# Patient Record
Sex: FEMALE | Race: WHITE | NOT HISPANIC OR LATINO | Employment: OTHER
[De-identification: names, ages, dates, MRNs, and addresses within clinical notes are randomized per-mention and may not be internally consistent; named-entity substitution may affect disease eponyms.]

---

## 2017-01-11 ENCOUNTER — HOSPITAL (OUTPATIENT)
Dept: OTHER | Age: 62
End: 2017-01-11
Attending: EMERGENCY MEDICINE

## 2017-01-11 LAB
ALBUMIN SERPL-MCNC: 4.3 GM/DL (ref 3.6–5.1)
ALBUMIN/GLOB SERPL: 1.1 {RATIO} (ref 1–2.4)
ALP SERPL-CCNC: 78 UNIT/L (ref 45–117)
ALT SERPL-CCNC: 30 UNIT/L
ANALYZER ANC (IANC): ABNORMAL
ANION GAP SERPL CALC-SCNC: 15 MMOL/L (ref 10–20)
AST SERPL-CCNC: 14 UNIT/L
BASOPHILS # BLD: 0.1 THOUSAND/MCL (ref 0–0.3)
BASOPHILS NFR BLD: 1 %
BILIRUB SERPL-MCNC: 0.3 MG/DL (ref 0.2–1)
BUN SERPL-MCNC: 22 MG/DL (ref 10–20)
BUN/CREAT SERPL: 29 (ref 7–25)
CALCIUM SERPL-MCNC: 9 MG/DL (ref 8.4–10.2)
CHLORIDE: 103 MMOL/L (ref 98–107)
CO2 SERPL-SCNC: 26 MMOL/L (ref 21–32)
CREAT SERPL-MCNC: 0.75 MG/DL (ref 0.51–0.95)
DIFFERENTIAL METHOD BLD: ABNORMAL
EOSINOPHIL # BLD: 0.4 THOUSAND/MCL (ref 0.1–0.5)
EOSINOPHIL NFR BLD: 4 %
ERYTHROCYTE [DISTWIDTH] IN BLOOD: 13.2 % (ref 11–15)
GLOBULIN SER-MCNC: 4 GM/DL (ref 2–4)
GLUCOSE SERPL-MCNC: 105 MG/DL (ref 65–99)
HEMATOCRIT: 45.7 % (ref 36–46.5)
HGB BLD-MCNC: 15.5 GM/DL (ref 12–15.5)
LIPASE SERPL-CCNC: 115 UNIT/L (ref 73–393)
LYMPHOCYTES # BLD: 2.1 THOUSAND/MCL (ref 1–4)
LYMPHOCYTES NFR BLD: 22 %
MCH RBC QN AUTO: 29.2 PG (ref 26–34)
MCHC RBC AUTO-ENTMCNC: 33.9 GM/DL (ref 32–36.5)
MCV RBC AUTO: 86.2 FL (ref 78–100)
MONOCYTES # BLD: 1 THOUSAND/MCL (ref 0.3–0.9)
MONOCYTES NFR BLD: 11 %
NEUTROPHILS # BLD: 5.8 THOUSAND/MCL (ref 1.8–7.7)
NEUTROPHILS NFR BLD: 62 %
NEUTS SEG NFR BLD: ABNORMAL %
PERCENT NRBC: ABNORMAL
PLATELET # BLD: 311 THOUSAND/MCL (ref 140–450)
POTASSIUM SERPL-SCNC: 4.3 MMOL/L (ref 3.4–5.1)
PROT SERPL-MCNC: 8.3 GM/DL (ref 6.4–8.2)
RBC # BLD: 5.3 MILLION/MCL (ref 4–5.2)
SODIUM SERPL-SCNC: 140 MMOL/L (ref 135–145)
TROPONIN I SERPL HS-MCNC: <0.02 NG/ML
WBC # BLD: 9.3 THOUSAND/MCL (ref 4.2–11)

## 2017-01-12 ENCOUNTER — DIAGNOSTIC TRANS (OUTPATIENT)
Dept: OTHER | Age: 62
End: 2017-01-12

## 2017-05-03 ENCOUNTER — PRIOR ORIGINAL RECORDS (OUTPATIENT)
Dept: OTHER | Age: 62
End: 2017-05-03

## 2017-05-03 ENCOUNTER — HOSPITAL (OUTPATIENT)
Dept: OTHER | Age: 62
End: 2017-05-03
Attending: INTERNAL MEDICINE

## 2017-05-03 LAB
25(OH)D3+25(OH)D2 SERPL-MCNC: 36.1 NG/ML (ref 30–100)
ALBUMIN SERPL-MCNC: 3.8 GM/DL (ref 3.6–5.1)
ALBUMIN/GLOB SERPL: 1 {RATIO} (ref 1–2.4)
ALP SERPL-CCNC: 67 UNIT/L (ref 45–117)
ALT SERPL-CCNC: 33 UNIT/L
ANALYZER ANC (IANC): NORMAL
ANION GAP SERPL CALC-SCNC: 14 MMOL/L (ref 10–20)
AST SERPL-CCNC: 15 UNIT/L
BILIRUB SERPL-MCNC: 0.4 MG/DL (ref 0.2–1)
BUN SERPL-MCNC: 23 MG/DL (ref 6–20)
BUN/CREAT SERPL: 30 (ref 7–25)
CALCIUM SERPL-MCNC: 8.7 MG/DL (ref 8.4–10.2)
CHLORIDE: 105 MMOL/L (ref 98–107)
CHOLEST SERPL-MCNC: 172 MG/DL
CHOLEST/HDLC SERPL: 3.4 {RATIO}
CO2 SERPL-SCNC: 24 MMOL/L (ref 21–32)
CREAT SERPL-MCNC: 0.77 MG/DL (ref 0.51–0.95)
ERYTHROCYTE [DISTWIDTH] IN BLOOD: 13.3 % (ref 11–15)
GLOBULIN SER-MCNC: 3.7 GM/DL (ref 2–4)
GLUCOSE SERPL-MCNC: 120 MG/DL (ref 65–99)
HDLC SERPL-MCNC: 50 MG/DL
HEMATOCRIT: 42.1 % (ref 36–46.5)
HGB BLD-MCNC: 14.2 GM/DL (ref 12–15.5)
LDLC SERPL CALC-MCNC: 103 MG/DL
LENGTH OF FAST TIME PATIENT: ABNORMAL HR
LENGTH OF FAST TIME PATIENT: ABNORMAL HR
MCH RBC QN AUTO: 29.1 PG (ref 26–34)
MCHC RBC AUTO-ENTMCNC: 33.7 GM/DL (ref 32–36.5)
MCV RBC AUTO: 86.3 FL (ref 78–100)
NONHDLC SERPL-MCNC: 122 MG/DL
PLATELET # BLD: 273 THOUSAND/MCL (ref 140–450)
POTASSIUM SERPL-SCNC: 4.1 MMOL/L (ref 3.4–5.1)
PROT SERPL-MCNC: 7.5 GM/DL (ref 6.4–8.2)
RBC # BLD: 4.88 MILLION/MCL (ref 4–5.2)
SODIUM SERPL-SCNC: 139 MMOL/L (ref 135–145)
TRIGLYCERIDE (TRIGP): 97 MG/DL
WBC # BLD: 6.3 THOUSAND/MCL (ref 4.2–11)

## 2017-05-04 LAB
ALBUMIN: 3.8 G/DL
ALKALINE PHOSPHATATE(ALK PHOS): 67 IU/L
ALT (SGPT): 33 U/L
AST (SGOT): 15 U/L
BILIRUBIN TOTAL: 0.4 MG/DL
BILIRUBIN TOTAL: 0.4 MG/DL
BUN: 23 MG/DL
CALCIUM: 8.7 MG/DL
CHLORIDE: 105 MEQ/L
CHOLESTEROL, TOTAL: 172 MG/DL
CREATININE, SERUM: 0.77 MG/DL
GLOBULIN: 3.7 G/DL
GLUCOSE: 120 MG/DL
GLUCOSE: 120 MG/DL
HDL CHOLESTEROL: 50 MG/DL
HEMATOCRIT: 42.1 %
HEMOGLOBIN: 14.2 G/DL
LDL CHOLESTEROL: 103 MG/DL
MCH: 29.1 PG
MCHC: 33.7 G/DL
MCV: 86.3 FL
NON-HDL CHOLESTEROL: 122 MG/DL
PLATELETS: 273 K/UL
POTASSIUM, SERUM: 4.1 MEQ/L
PROTEIN, TOTAL: 7.5 G/DL
RED BLOOD COUNT: 4.88 X 10-6/U
SGOT (AST): 15 IU/L
SGPT (ALT): 33 IU/L
SODIUM: 139 MEQ/L
TOTAL CHOLESTEROL / HDL RATIO: 3.4 RATIO UN
TRIGLYCERIDES: 97 MG/DL
VITAMIN D 25-OH: 36.1 NG/ML
WHITE BLOOD COUNT: 6.3 X 10-3/U

## 2017-05-11 ENCOUNTER — PRIOR ORIGINAL RECORDS (OUTPATIENT)
Dept: OTHER | Age: 62
End: 2017-05-11

## 2017-11-27 ENCOUNTER — CHARTING TRANS (OUTPATIENT)
Dept: OTHER | Age: 62
End: 2017-11-27

## 2017-11-27 ASSESSMENT — PAIN SCALES - GENERAL: PAINLEVEL_OUTOF10: 1

## 2017-12-12 ENCOUNTER — CHARTING TRANS (OUTPATIENT)
Dept: OTHER | Age: 62
End: 2017-12-12

## 2017-12-12 ENCOUNTER — MYAURORA ACCOUNT LINK (OUTPATIENT)
Dept: OTHER | Age: 62
End: 2017-12-12

## 2017-12-13 ENCOUNTER — HOSPITAL (OUTPATIENT)
Dept: OTHER | Age: 62
End: 2017-12-13
Attending: LEGAL MEDICINE

## 2017-12-13 ENCOUNTER — IMAGING SERVICES (OUTPATIENT)
Dept: OTHER | Age: 62
End: 2017-12-13

## 2018-03-16 ENCOUNTER — PRIOR ORIGINAL RECORDS (OUTPATIENT)
Dept: OTHER | Age: 63
End: 2018-03-16

## 2018-03-23 ENCOUNTER — PRIOR ORIGINAL RECORDS (OUTPATIENT)
Dept: OTHER | Age: 63
End: 2018-03-23

## 2018-04-04 ENCOUNTER — CHARTING TRANS (OUTPATIENT)
Dept: OTHER | Age: 63
End: 2018-04-04

## 2018-04-04 ENCOUNTER — MYAURORA ACCOUNT LINK (OUTPATIENT)
Dept: OTHER | Age: 63
End: 2018-04-04

## 2018-04-04 ENCOUNTER — LAB SERVICES (OUTPATIENT)
Dept: OTHER | Age: 63
End: 2018-04-04

## 2018-04-04 LAB
APPEARANCE: NORMAL
BILIRUBIN: NEGATIVE
COLOR: YELLOW
GLUCOSE U: NEGATIVE
KETONES: NEGATIVE
LEUKOCYTE ESTERASE: NORMAL
NITRITE: NEGATIVE
OCCULT BLOOD: NORMAL
PH: 7
PROTEIN: NEGATIVE
URINE SPEC GRAVITY: 1.01
UROBILINOGEN: 0.2

## 2018-04-07 ENCOUNTER — CHARTING TRANS (OUTPATIENT)
Dept: OTHER | Age: 63
End: 2018-04-07

## 2018-04-07 LAB — BACTERIA UR CULT: NORMAL

## 2018-05-31 ENCOUNTER — MYAURORA ACCOUNT LINK (OUTPATIENT)
Dept: OTHER | Age: 63
End: 2018-05-31

## 2018-05-31 ENCOUNTER — PRIOR ORIGINAL RECORDS (OUTPATIENT)
Dept: OTHER | Age: 63
End: 2018-05-31

## 2018-05-31 LAB
ALKALINE PHOSPHATATE(ALK PHOS): 77 IU/L
BILIRUBIN TOTAL: 0.3 MG/DL
BUN: 16 MG/DL
CALCIUM: 10.1 MG/DL
CHLORIDE: 101 MEQ/L
CREATININE, SERUM: 0.77 MG/DL
GLUCOSE: 116 MG/DL
POTASSIUM, SERUM: 4.2 MEQ/L
PROTEIN, TOTAL: 7.6 G/DL
SGOT (AST): 14 IU/L
SGPT (ALT): 22 IU/L
SODIUM: 138 MEQ/L

## 2018-06-05 ENCOUNTER — HOSPITAL (OUTPATIENT)
Dept: OTHER | Age: 63
End: 2018-06-05
Attending: INTERNAL MEDICINE

## 2018-06-05 ENCOUNTER — PRIOR ORIGINAL RECORDS (OUTPATIENT)
Dept: OTHER | Age: 63
End: 2018-06-05

## 2018-06-05 LAB
CHOLEST SERPL-MCNC: 202 MG/DL
CHOLEST/HDLC SERPL: 4.5 {RATIO}
HDLC SERPL-MCNC: 45 MG/DL
LDLC SERPL CALC-MCNC: 129 MG/DL
LENGTH OF FAST TIME PATIENT: ABNORMAL HR
NONHDLC SERPL-MCNC: 157 MG/DL
TRIGLYCERIDE (TRIGP): 139 MG/DL

## 2018-06-06 LAB
CHOLESTEROL, TOTAL: 202 MG/DL
HDL CHOLESTEROL: 45 MG/DL
LDL CHOLESTEROL: 129 MG/DL
TRIGLYCERIDES: 139 MG/DL

## 2018-06-20 ENCOUNTER — PRIOR ORIGINAL RECORDS (OUTPATIENT)
Dept: OTHER | Age: 63
End: 2018-06-20

## 2018-06-28 ENCOUNTER — HOSPITAL (OUTPATIENT)
Dept: OTHER | Age: 63
End: 2018-06-28
Attending: ORTHOPAEDIC SURGERY

## 2018-06-28 ENCOUNTER — DIAGNOSTIC TRANS (OUTPATIENT)
Dept: OTHER | Age: 63
End: 2018-06-28

## 2018-06-28 LAB
ALBUMIN SERPL-MCNC: 4.4 GM/DL (ref 3.6–5.1)
ALBUMIN/GLOB SERPL: 1.2 {RATIO} (ref 1–2.4)
ALP SERPL-CCNC: 85 UNIT/L (ref 45–117)
ALT SERPL-CCNC: 31 UNIT/L
ANALYZER ANC (IANC): NORMAL
ANION GAP SERPL CALC-SCNC: 16 MMOL/L (ref 10–20)
AST SERPL-CCNC: 18 UNIT/L
BASOPHILS # BLD: 0.1 THOUSAND/MCL (ref 0–0.3)
BASOPHILS NFR BLD: 1 %
BILIRUB SERPL-MCNC: 0.4 MG/DL (ref 0.2–1)
BUN SERPL-MCNC: 22 MG/DL (ref 6–20)
BUN/CREAT SERPL: 29 (ref 7–25)
CALCIUM SERPL-MCNC: 9.1 MG/DL (ref 8.4–10.2)
CHLORIDE: 103 MMOL/L (ref 98–107)
CO2 SERPL-SCNC: 24 MMOL/L (ref 21–32)
CREAT SERPL-MCNC: 0.76 MG/DL (ref 0.51–0.95)
DIFFERENTIAL METHOD BLD: NORMAL
EOSINOPHIL # BLD: 0.3 THOUSAND/MCL (ref 0.1–0.5)
EOSINOPHIL NFR BLD: 3 %
ERYTHROCYTE [DISTWIDTH] IN BLOOD: 13.9 % (ref 11–15)
GLOBULIN SER-MCNC: 3.6 GM/DL (ref 2–4)
GLUCOSE SERPL-MCNC: 97 MG/DL (ref 65–99)
HEMATOCRIT: 42.6 % (ref 36–46.5)
HGB BLD-MCNC: 14.4 GM/DL (ref 12–15.5)
INR PPP: 1.1
LENGTH OF FAST TIME PATIENT: ABNORMAL HR
LYMPHOCYTES # BLD: 1.9 THOUSAND/MCL (ref 1–4)
LYMPHOCYTES NFR BLD: 25 %
MCH RBC QN AUTO: 28.7 PG (ref 26–34)
MCHC RBC AUTO-ENTMCNC: 33.8 GM/DL (ref 32–36.5)
MCV RBC AUTO: 84.9 FL (ref 78–100)
MONOCYTES # BLD: 0.6 THOUSAND/MCL (ref 0.3–0.9)
MONOCYTES NFR BLD: 8 %
NEUTROPHILS # BLD: 4.7 THOUSAND/MCL (ref 1.8–7.7)
NEUTROPHILS NFR BLD: 63 %
NEUTS SEG NFR BLD: NORMAL %
NRBC (NRBCRE): NORMAL
PLATELET # BLD: 303 THOUSAND/MCL (ref 140–450)
POTASSIUM SERPL-SCNC: 4.4 MMOL/L (ref 3.4–5.1)
PROT SERPL-MCNC: 8 GM/DL (ref 6.4–8.2)
PROTHROMBIN TIME: 10.6 SECONDS (ref 9.7–11.8)
PROTHROMBIN TIME: NORMAL
RBC # BLD: 5.02 MILLION/MCL (ref 4–5.2)
SODIUM SERPL-SCNC: 139 MMOL/L (ref 135–145)
WBC # BLD: 7.6 THOUSAND/MCL (ref 4.2–11)

## 2018-07-02 ENCOUNTER — MYAURORA ACCOUNT LINK (OUTPATIENT)
Dept: OTHER | Age: 63
End: 2018-07-02

## 2018-07-02 ENCOUNTER — CHARTING TRANS (OUTPATIENT)
Dept: OTHER | Age: 63
End: 2018-07-02

## 2018-07-06 ENCOUNTER — HOSPITAL (OUTPATIENT)
Dept: OTHER | Age: 63
End: 2018-07-06
Attending: INTERNAL MEDICINE

## 2018-07-09 ENCOUNTER — PRIOR ORIGINAL RECORDS (OUTPATIENT)
Dept: OTHER | Age: 63
End: 2018-07-09

## 2018-07-10 ENCOUNTER — PRIOR ORIGINAL RECORDS (OUTPATIENT)
Dept: OTHER | Age: 63
End: 2018-07-10

## 2018-07-10 ENCOUNTER — MYAURORA ACCOUNT LINK (OUTPATIENT)
Dept: OTHER | Age: 63
End: 2018-07-10

## 2018-07-11 ENCOUNTER — PRIOR ORIGINAL RECORDS (OUTPATIENT)
Dept: OTHER | Age: 63
End: 2018-07-11

## 2018-07-13 ENCOUNTER — LAB SERVICES (OUTPATIENT)
Dept: OTHER | Age: 63
End: 2018-07-13

## 2018-07-13 ENCOUNTER — IMAGING SERVICES (OUTPATIENT)
Dept: OTHER | Age: 63
End: 2018-07-13

## 2018-07-13 ENCOUNTER — HOSPITAL (OUTPATIENT)
Dept: OTHER | Age: 63
End: 2018-07-13
Attending: ORTHOPAEDIC SURGERY

## 2018-07-13 LAB
HEMATOCRIT: 38.9 % (ref 36–46.5)
HGB BLD-MCNC: 12.9 GM/DL (ref 12–15.5)

## 2018-07-14 LAB
ANION GAP SERPL CALC-SCNC: 13 MMOL/L (ref 10–20)
BUN SERPL-MCNC: 22 MG/DL (ref 6–20)
BUN/CREAT SERPL: 31 (ref 7–25)
CALCIUM SERPL-MCNC: 8.5 MG/DL (ref 8.4–10.2)
CHLORIDE: 102 MMOL/L (ref 98–107)
CO2 SERPL-SCNC: 24 MMOL/L (ref 21–32)
CREAT SERPL-MCNC: 0.7 MG/DL (ref 0.51–0.95)
GLUCOSE SERPL-MCNC: 120 MG/DL (ref 65–99)
HEMATOCRIT: 36 % (ref 36–46.5)
HGB BLD-MCNC: 12.4 GM/DL (ref 12–15.5)
POTASSIUM SERPL-SCNC: 4.9 MMOL/L (ref 3.4–5.1)
SODIUM SERPL-SCNC: 134 MMOL/L (ref 135–145)

## 2018-07-15 LAB
ABO + RH BLD: NORMAL
BLD GP AB SCN SERPL QL: NEGATIVE
BLOOD BANK CMNT PATIENT-IMP: NORMAL
CROSSMATCH EXPIRE: NORMAL
HEMATOCRIT: 38.9 % (ref 36–46.5)
HEMOGLOBIN: 12.9 G/DL (ref 12–15.5)

## 2018-10-31 VITALS
WEIGHT: 218.79 LBS | SYSTOLIC BLOOD PRESSURE: 110 MMHG | TEMPERATURE: 99.2 F | BODY MASS INDEX: 38.77 KG/M2 | HEIGHT: 63 IN | RESPIRATION RATE: 16 BRPM | DIASTOLIC BLOOD PRESSURE: 64 MMHG | OXYGEN SATURATION: 96 % | HEART RATE: 74 BPM

## 2018-11-01 VITALS
HEART RATE: 70 BPM | OXYGEN SATURATION: 98 % | DIASTOLIC BLOOD PRESSURE: 82 MMHG | BODY MASS INDEX: 39.45 KG/M2 | SYSTOLIC BLOOD PRESSURE: 132 MMHG | TEMPERATURE: 98.8 F | HEIGHT: 63 IN | RESPIRATION RATE: 16 BRPM | WEIGHT: 222.66 LBS

## 2018-11-02 VITALS
WEIGHT: 224.25 LBS | OXYGEN SATURATION: 98 % | HEART RATE: 82 BPM | HEIGHT: 64 IN | BODY MASS INDEX: 38.29 KG/M2 | SYSTOLIC BLOOD PRESSURE: 160 MMHG | TEMPERATURE: 99 F | DIASTOLIC BLOOD PRESSURE: 98 MMHG | RESPIRATION RATE: 16 BRPM

## 2018-11-02 VITALS
SYSTOLIC BLOOD PRESSURE: 120 MMHG | BODY MASS INDEX: 39.87 KG/M2 | HEIGHT: 63 IN | DIASTOLIC BLOOD PRESSURE: 82 MMHG | WEIGHT: 225.01 LBS

## 2018-11-26 ENCOUNTER — HOSPITAL (OUTPATIENT)
Dept: OTHER | Age: 63
End: 2018-11-26
Attending: EMERGENCY MEDICINE

## 2018-11-26 ENCOUNTER — IMAGING SERVICES (OUTPATIENT)
Dept: OTHER | Age: 63
End: 2018-11-26

## 2018-11-26 ENCOUNTER — CHARTING TRANS (OUTPATIENT)
Dept: OTHER | Age: 63
End: 2018-11-26

## 2018-11-26 LAB
ANALYZER ANC (IANC): NORMAL
ANION GAP SERPL CALC-SCNC: 14 MMOL/L (ref 10–20)
BASOPHILS # BLD: 0.1 THOUSAND/MCL (ref 0–0.3)
BASOPHILS NFR BLD: 1 %
BUN SERPL-MCNC: 30 MG/DL (ref 6–20)
BUN/CREAT SERPL: 45 (ref 7–25)
CALCIUM SERPL-MCNC: 9.2 MG/DL (ref 8.4–10.2)
CHLORIDE: 103 MMOL/L (ref 98–107)
CO2 SERPL-SCNC: 25 MMOL/L (ref 21–32)
CREAT SERPL-MCNC: 0.66 MG/DL (ref 0.51–0.95)
DIFFERENTIAL METHOD BLD: NORMAL
EOSINOPHIL # BLD: 0.5 THOUSAND/MCL (ref 0.1–0.5)
EOSINOPHIL NFR BLD: 6 %
ERYTHROCYTE [DISTWIDTH] IN BLOOD: 13.7 % (ref 11–15)
GLUCOSE SERPL-MCNC: 104 MG/DL (ref 65–99)
HEMATOCRIT: 42.6 % (ref 36–46.5)
HGB BLD-MCNC: 14.3 GM/DL (ref 12–15.5)
LYMPHOCYTES # BLD: 2.1 THOUSAND/MCL (ref 1–4)
LYMPHOCYTES NFR BLD: 26 %
MCH RBC QN AUTO: 28.6 PG (ref 26–34)
MCHC RBC AUTO-ENTMCNC: 33.6 GM/DL (ref 32–36.5)
MCV RBC AUTO: 85.2 FL (ref 78–100)
MONOCYTES # BLD: 0.8 THOUSAND/MCL (ref 0.3–0.9)
MONOCYTES NFR BLD: 9 %
NEUTROPHILS # BLD: 4.7 THOUSAND/MCL (ref 1.8–7.7)
NEUTROPHILS NFR BLD: 58 %
NEUTS SEG NFR BLD: NORMAL %
NRBC (NRBCRE): NORMAL
PLATELET # BLD: 304 THOUSAND/MCL (ref 140–450)
POTASSIUM SERPL-SCNC: 4 MMOL/L (ref 3.4–5.1)
RBC # BLD: 5 MILLION/MCL (ref 4–5.2)
SODIUM SERPL-SCNC: 138 MMOL/L (ref 135–145)
TROPONIN I SERPL HS-MCNC: <0.02 NG/ML
WBC # BLD: 8.1 THOUSAND/MCL (ref 4.2–11)

## 2018-11-27 ENCOUNTER — DIAGNOSTIC TRANS (OUTPATIENT)
Dept: OTHER | Age: 63
End: 2018-11-27

## 2019-01-01 ENCOUNTER — EXTERNAL RECORD (OUTPATIENT)
Dept: HEALTH INFORMATION MANAGEMENT | Facility: OTHER | Age: 64
End: 2019-01-01

## 2019-02-28 VITALS
BODY MASS INDEX: 38.02 KG/M2 | SYSTOLIC BLOOD PRESSURE: 146 MMHG | RESPIRATION RATE: 16 BRPM | HEIGHT: 64 IN | WEIGHT: 222.7 LBS | DIASTOLIC BLOOD PRESSURE: 84 MMHG | HEART RATE: 72 BPM

## 2019-03-01 VITALS
SYSTOLIC BLOOD PRESSURE: 134 MMHG | HEART RATE: 72 BPM | WEIGHT: 225 LBS | BODY MASS INDEX: 38.41 KG/M2 | RESPIRATION RATE: 16 BRPM | HEIGHT: 64 IN | DIASTOLIC BLOOD PRESSURE: 77 MMHG

## 2019-03-07 RX ORDER — LISINOPRIL 10 MG/1
TABLET ORAL
COMMUNITY
Start: 2017-12-12 | End: 2019-05-30 | Stop reason: SDUPTHER

## 2019-03-29 RX ORDER — ASPIRIN 325 MG
325 TABLET ORAL DAILY
Status: ON HOLD | COMMUNITY
Start: 2018-05-31 | End: 2023-07-26 | Stop reason: HOSPADM

## 2019-03-29 RX ORDER — ATORVASTATIN CALCIUM 20 MG/1
TABLET, FILM COATED ORAL
COMMUNITY
Start: 2018-07-11 | End: 2019-05-30 | Stop reason: SDUPTHER

## 2019-03-29 RX ORDER — NAPROXEN SODIUM 220 MG
440 TABLET ORAL 2 TIMES DAILY WITH MEALS
Status: ON HOLD | COMMUNITY
End: 2023-07-26 | Stop reason: HOSPADM

## 2019-03-29 RX ORDER — LORATADINE 10 MG/1
10 TABLET ORAL DAILY
COMMUNITY
Start: 2018-05-31

## 2019-05-24 ENCOUNTER — HOSPITAL (OUTPATIENT)
Dept: OTHER | Age: 64
End: 2019-05-24
Attending: INTERNAL MEDICINE

## 2019-05-24 LAB
25(OH)D3+25(OH)D2 SERPL-MCNC: 42.3 NG/ML (ref 30–100)
25(OH)D3+25(OH)D2 SERPL-MCNC: NORMAL NG/ML
ALBUMIN SERPL-MCNC: 4.2 G/DL (ref 3.6–5.1)
ALBUMIN/GLOB SERPL: 1.2 {RATIO} (ref 1–2.4)
ALP SERPL-CCNC: 86 UNITS/L (ref 45–117)
ALT SERPL-CCNC: 28 UNITS/L
ANALYZER ANC (IANC): NORMAL
ANION GAP SERPL CALC-SCNC: 11 MMOL/L (ref 10–20)
AST SERPL-CCNC: 16 UNITS/L
BILIRUB SERPL-MCNC: 0.5 MG/DL (ref 0.2–1)
BUN SERPL-MCNC: 25 MG/DL (ref 6–20)
BUN/CREAT SERPL: 28 (ref 7–25)
CALCIUM SERPL-MCNC: 9.5 MG/DL (ref 8.4–10.2)
CHLORIDE SERPL-SCNC: 106 MMOL/L (ref 98–107)
CHOLEST SERPL-MCNC: 148 MG/DL
CHOLEST SERPL-MCNC: ABNORMAL MG/DL
CHOLEST/HDLC SERPL: 3.3 {RATIO}
CO2 SERPL-SCNC: 27 MMOL/L (ref 21–32)
CREAT SERPL-MCNC: 0.9 MG/DL (ref 0.51–0.95)
ERYTHROCYTE [DISTWIDTH] IN BLOOD: 13.6 % (ref 11–15)
GLOBULIN SER-MCNC: 3.5 G/DL (ref 2–4)
GLUCOSE SERPL-MCNC: 106 MG/DL (ref 65–99)
HCT VFR BLD CALC: 43.5 % (ref 36–46.5)
HDLC SERPL-MCNC: 45 MG/DL
HDLC SERPL-MCNC: ABNORMAL MG/DL
HGB BLD-MCNC: 14.3 G/DL (ref 12–15.5)
LDLC SERPL CALC-MCNC: 83 MG/DL
LDLC SERPL CALC-MCNC: ABNORMAL MG/DL
LENGTH OF FAST TIME PATIENT: ABNORMAL HRS
LENGTH OF FAST TIME PATIENT: ABNORMAL HRS
MCH RBC QN AUTO: 28.3 PG (ref 26–34)
MCHC RBC AUTO-ENTMCNC: 32.9 G/DL (ref 32–36.5)
MCV RBC AUTO: 86 FL (ref 78–100)
NONHDLC SERPL-MCNC: 103 MG/DL
NONHDLC SERPL-MCNC: ABNORMAL MG/DL
NRBC (NRBCRE): 0 /100 WBC
PLATELET # BLD: 313 K/MCL (ref 140–450)
POTASSIUM SERPL-SCNC: 4.6 MMOL/L (ref 3.4–5.1)
PROT SERPL-MCNC: 7.7 G/DL (ref 6.4–8.2)
RBC # BLD: 5.06 MIL/MCL (ref 4–5.2)
SODIUM SERPL-SCNC: 139 MMOL/L (ref 135–145)
TRIGL SERPL-MCNC: 98 MG/DL
TRIGL SERPL-MCNC: ABNORMAL MG/DL
WBC # BLD: 6.6 K/MCL (ref 4.2–11)

## 2019-05-28 PROBLEM — G47.33 OSA ON CPAP: Status: ACTIVE | Noted: 2018-07-02

## 2019-05-28 PROBLEM — I25.10 CORONARY ARTERY CALCIFICATION SEEN ON CT SCAN: Status: ACTIVE | Noted: 2018-07-09

## 2019-05-29 ENCOUNTER — TELEPHONE (OUTPATIENT)
Dept: SCHEDULING | Age: 64
End: 2019-05-29

## 2019-05-30 ENCOUNTER — OFFICE VISIT (OUTPATIENT)
Dept: CARDIOLOGY | Age: 64
End: 2019-05-30

## 2019-05-30 VITALS
SYSTOLIC BLOOD PRESSURE: 134 MMHG | HEIGHT: 64 IN | DIASTOLIC BLOOD PRESSURE: 82 MMHG | BODY MASS INDEX: 38.07 KG/M2 | WEIGHT: 223 LBS | HEART RATE: 68 BPM | RESPIRATION RATE: 16 BRPM

## 2019-05-30 DIAGNOSIS — I25.10 CORONARY ARTERY CALCIFICATION SEEN ON CT SCAN: ICD-10-CM

## 2019-05-30 DIAGNOSIS — E55.9 VITAMIN D DEFICIENCY: ICD-10-CM

## 2019-05-30 DIAGNOSIS — G47.33 OSA ON CPAP: ICD-10-CM

## 2019-05-30 DIAGNOSIS — Z82.49 FAMILY HISTORY OF CORONARY ARTERIOSCLEROSIS: ICD-10-CM

## 2019-05-30 DIAGNOSIS — E78.2 MIXED HYPERLIPIDEMIA: Primary | ICD-10-CM

## 2019-05-30 DIAGNOSIS — I10 BENIGN ESSENTIAL HYPERTENSION: ICD-10-CM

## 2019-05-30 PROCEDURE — 3079F DIAST BP 80-89 MM HG: CPT | Performed by: INTERNAL MEDICINE

## 2019-05-30 PROCEDURE — 99215 OFFICE O/P EST HI 40 MIN: CPT | Performed by: INTERNAL MEDICINE

## 2019-05-30 PROCEDURE — 3075F SYST BP GE 130 - 139MM HG: CPT | Performed by: INTERNAL MEDICINE

## 2019-05-30 RX ORDER — ATORVASTATIN CALCIUM 20 MG/1
20 TABLET, FILM COATED ORAL DAILY
Qty: 90 TABLET | Refills: 3 | Status: SHIPPED | OUTPATIENT
Start: 2019-05-30 | End: 2020-07-02 | Stop reason: SDUPTHER

## 2019-05-30 RX ORDER — LISINOPRIL 10 MG/1
10 TABLET ORAL DAILY
Qty: 90 TABLET | Refills: 3 | Status: SHIPPED | OUTPATIENT
Start: 2019-05-30 | End: 2020-07-02 | Stop reason: SDUPTHER

## 2019-05-30 ASSESSMENT — ENCOUNTER SYMPTOMS
ALLERGIC/IMMUNOLOGIC COMMENTS: NO NEW FOOD ALLERGIES
BRUISES/BLEEDS EASILY: 0
BACK PAIN: 1
CHILLS: 0
HEMATOCHEZIA: 0
WEIGHT GAIN: 0
COUGH: 0
SUSPICIOUS LESIONS: 0
FEVER: 0
HEMOPTYSIS: 0
WEIGHT LOSS: 0

## 2019-06-06 ENCOUNTER — HOSPITAL (OUTPATIENT)
Dept: OTHER | Age: 64
End: 2019-06-06
Attending: ORTHOPAEDIC SURGERY

## 2019-06-06 LAB
ANALYZER ANC (IANC): NORMAL
BASOPHILS # BLD: 0.1 K/MCL (ref 0–0.3)
BASOPHILS NFR BLD: 1 %
CRP SERPL-MCNC: <0.3 MG/DL
DIFFERENTIAL METHOD BLD: NORMAL
EOSINOPHIL # BLD: 0.2 K/MCL (ref 0.1–0.5)
EOSINOPHIL NFR BLD: 3 %
ERYTHROCYTE [DISTWIDTH] IN BLOOD: 13.4 % (ref 11–15)
ERYTHROCYTE [SEDIMENTATION RATE] IN BLOOD BY WESTERGREN METHOD: 17 MM/HR (ref 0–20)
HCT VFR BLD CALC: 43 % (ref 36–46.5)
HGB BLD-MCNC: 14.4 G/DL (ref 12–15.5)
IMM GRANULOCYTES # BLD AUTO: 0 K/MCL (ref 0–0.2)
IMM GRANULOCYTES NFR BLD: 0 %
LYMPHOCYTES # BLD: 1.8 K/MCL (ref 1–4)
LYMPHOCYTES NFR BLD: 23 %
MCH RBC QN AUTO: 28.6 PG (ref 26–34)
MCHC RBC AUTO-ENTMCNC: 33.5 G/DL (ref 32–36.5)
MCV RBC AUTO: 85.3 FL (ref 78–100)
MONOCYTES # BLD: 0.9 K/MCL (ref 0.3–0.9)
MONOCYTES NFR BLD: 11 %
NEUTROPHILS # BLD: 4.8 K/MCL (ref 1.8–7.7)
NEUTROPHILS NFR BLD: 62 %
NEUTS SEG NFR BLD: NORMAL %
NRBC (NRBCRE): 0 /100 WBC
PLATELET # BLD: 281 K/MCL (ref 140–450)
RBC # BLD: 5.04 MIL/MCL (ref 4–5.2)
WBC # BLD: 7.8 K/MCL (ref 4.2–11)

## 2019-06-09 LAB
IL6 SERPL-MCNC: <5 PG/ML
IL6 SERPL-MCNC: NORMAL PG/ML

## 2019-06-18 ENCOUNTER — HOSPITAL (OUTPATIENT)
Dept: OTHER | Age: 64
End: 2019-06-18
Attending: NEUROLOGICAL SURGERY

## 2019-06-18 ENCOUNTER — DIAGNOSTIC TRANS (OUTPATIENT)
Dept: OTHER | Age: 64
End: 2019-06-18

## 2019-06-18 LAB
ALBUMIN SERPL-MCNC: 4.5 G/DL (ref 3.6–5.1)
ALBUMIN/GLOB SERPL: 1.2 {RATIO} (ref 1–2.4)
ALP SERPL-CCNC: 98 UNITS/L (ref 45–117)
ALT SERPL-CCNC: 29 UNITS/L
ANALYZER ANC (IANC): NORMAL
ANION GAP SERPL CALC-SCNC: 11 MMOL/L (ref 10–20)
AST SERPL-CCNC: 15 UNITS/L
BASOPHILS # BLD: 0.1 K/MCL (ref 0–0.3)
BASOPHILS NFR BLD: 1 %
BILIRUB SERPL-MCNC: 0.3 MG/DL (ref 0.2–1)
BUN SERPL-MCNC: 26 MG/DL (ref 6–20)
BUN/CREAT SERPL: 35 (ref 7–25)
CALCIUM SERPL-MCNC: 10 MG/DL (ref 8.4–10.2)
CHLORIDE SERPL-SCNC: 104 MMOL/L (ref 98–107)
CO2 SERPL-SCNC: 28 MMOL/L (ref 21–32)
CREAT SERPL-MCNC: 0.75 MG/DL (ref 0.51–0.95)
DIFFERENTIAL METHOD BLD: NORMAL
EOSINOPHIL # BLD: 0.3 K/MCL (ref 0.1–0.5)
EOSINOPHIL NFR BLD: 4 %
ERYTHROCYTE [DISTWIDTH] IN BLOOD: 13.4 % (ref 11–15)
GLOBULIN SER-MCNC: 3.7 G/DL (ref 2–4)
GLUCOSE SERPL-MCNC: 96 MG/DL (ref 65–99)
HCT VFR BLD CALC: 44.5 % (ref 36–46.5)
HGB BLD-MCNC: 14.6 G/DL (ref 12–15.5)
IMM GRANULOCYTES # BLD AUTO: 0 K/MCL (ref 0–0.2)
IMM GRANULOCYTES NFR BLD: 0 %
INR PPP: 1
INR PPP: NORMAL
LENGTH OF FAST TIME PATIENT: ABNORMAL HRS
LYMPHOCYTES # BLD: 1.7 K/MCL (ref 1–4)
LYMPHOCYTES NFR BLD: 21 %
MCH RBC QN AUTO: 28.3 PG (ref 26–34)
MCHC RBC AUTO-ENTMCNC: 32.8 G/DL (ref 32–36.5)
MCV RBC AUTO: 86.4 FL (ref 78–100)
MONOCYTES # BLD: 0.9 K/MCL (ref 0.3–0.9)
MONOCYTES NFR BLD: 11 %
NEUTROPHILS # BLD: 5.1 K/MCL (ref 1.8–7.7)
NEUTROPHILS NFR BLD: 63 %
NEUTS SEG NFR BLD: NORMAL %
NRBC (NRBCRE): 0 /100 WBC
PLATELET # BLD: 300 K/MCL (ref 140–450)
POTASSIUM SERPL-SCNC: 4.8 MMOL/L (ref 3.4–5.1)
PROT SERPL-MCNC: 8.2 G/DL (ref 6.4–8.2)
PROTHROMBIN TIME (PRT2): NORMAL
PROTHROMBIN TIME: 10.5 SEC (ref 9.7–11.8)
RBC # BLD: 5.15 MIL/MCL (ref 4–5.2)
SODIUM SERPL-SCNC: 138 MMOL/L (ref 135–145)
WBC # BLD: 8.2 K/MCL (ref 4.2–11)

## 2019-06-24 ENCOUNTER — OFFICE VISIT (OUTPATIENT)
Dept: FAMILY MEDICINE | Age: 64
End: 2019-06-24

## 2019-06-24 VITALS
BODY MASS INDEX: 38.07 KG/M2 | SYSTOLIC BLOOD PRESSURE: 128 MMHG | DIASTOLIC BLOOD PRESSURE: 74 MMHG | TEMPERATURE: 98.4 F | RESPIRATION RATE: 16 BRPM | HEIGHT: 64 IN | WEIGHT: 223 LBS | HEART RATE: 80 BPM

## 2019-06-24 DIAGNOSIS — Z01.818 PREOP EXAMINATION: Primary | ICD-10-CM

## 2019-06-24 DIAGNOSIS — I10 BENIGN ESSENTIAL HYPERTENSION: ICD-10-CM

## 2019-06-24 DIAGNOSIS — G47.33 OSA ON CPAP: ICD-10-CM

## 2019-06-24 DIAGNOSIS — E78.2 MIXED HYPERLIPIDEMIA: ICD-10-CM

## 2019-06-24 DIAGNOSIS — M54.17 LUMBOSACRAL RADICULOPATHY: ICD-10-CM

## 2019-06-24 DIAGNOSIS — E66.9 OBESITY (BMI 30.0-34.9): ICD-10-CM

## 2019-06-24 PROBLEM — E66.811 OBESITY (BMI 30.0-34.9): Status: ACTIVE | Noted: 2019-06-24

## 2019-06-24 PROCEDURE — 99244 OFF/OP CNSLTJ NEW/EST MOD 40: CPT | Performed by: FAMILY MEDICINE

## 2019-06-24 PROCEDURE — 3078F DIAST BP <80 MM HG: CPT | Performed by: FAMILY MEDICINE

## 2019-06-24 PROCEDURE — 3074F SYST BP LT 130 MM HG: CPT | Performed by: FAMILY MEDICINE

## 2019-06-24 ASSESSMENT — ENCOUNTER SYMPTOMS
WEAKNESS: 1
PSYCHIATRIC NEGATIVE: 1
BACK PAIN: 1
ACTIVITY CHANGE: 1
GASTROINTESTINAL NEGATIVE: 1
RESPIRATORY NEGATIVE: 1
NUMBNESS: 1

## 2019-07-02 ENCOUNTER — TELEPHONE (OUTPATIENT)
Dept: CARDIOLOGY | Age: 64
End: 2019-07-02

## 2019-07-08 ENCOUNTER — TELEPHONE (OUTPATIENT)
Dept: CARDIOLOGY | Age: 64
End: 2019-07-08

## 2019-09-04 ENCOUNTER — OFFICE VISIT (OUTPATIENT)
Dept: CARDIOLOGY | Age: 64
End: 2019-09-04

## 2019-09-04 VITALS
RESPIRATION RATE: 16 BRPM | HEART RATE: 68 BPM | SYSTOLIC BLOOD PRESSURE: 110 MMHG | HEIGHT: 63 IN | WEIGHT: 217 LBS | BODY MASS INDEX: 38.45 KG/M2 | DIASTOLIC BLOOD PRESSURE: 80 MMHG

## 2019-09-04 DIAGNOSIS — I10 BENIGN ESSENTIAL HYPERTENSION: ICD-10-CM

## 2019-09-04 DIAGNOSIS — E78.2 MIXED HYPERLIPIDEMIA: ICD-10-CM

## 2019-09-04 DIAGNOSIS — I25.10 CORONARY ARTERY CALCIFICATION SEEN ON CT SCAN: ICD-10-CM

## 2019-09-04 DIAGNOSIS — G47.33 OSA ON CPAP: Primary | ICD-10-CM

## 2019-09-04 DIAGNOSIS — Z82.49 FAMILY HISTORY OF CORONARY ARTERIOSCLEROSIS: ICD-10-CM

## 2019-09-04 PROCEDURE — 3074F SYST BP LT 130 MM HG: CPT | Performed by: INTERNAL MEDICINE

## 2019-09-04 PROCEDURE — 99214 OFFICE O/P EST MOD 30 MIN: CPT | Performed by: INTERNAL MEDICINE

## 2019-09-04 PROCEDURE — 3079F DIAST BP 80-89 MM HG: CPT | Performed by: INTERNAL MEDICINE

## 2019-09-04 RX ORDER — HYDROCODONE BITARTRATE AND ACETAMINOPHEN 10; 325 MG/1; MG/1
1 TABLET ORAL
COMMUNITY
End: 2022-10-04

## 2019-09-04 RX ORDER — DIAZEPAM 5 MG/1
5 TABLET ORAL
COMMUNITY
End: 2022-10-04

## 2019-09-04 SDOH — HEALTH STABILITY: PHYSICAL HEALTH: ON AVERAGE, HOW MANY DAYS PER WEEK DO YOU ENGAGE IN MODERATE TO STRENUOUS EXERCISE (LIKE A BRISK WALK)?: 3 DAYS

## 2019-09-04 ASSESSMENT — ENCOUNTER SYMPTOMS
SHORTNESS OF BREATH: 0
WEIGHT GAIN: 0
SUSPICIOUS LESIONS: 0
BRUISES/BLEEDS EASILY: 0
DIZZINESS: 0
CHILLS: 0
ABDOMINAL PAIN: 0
PSYCHIATRIC NEGATIVE: 1
WEIGHT LOSS: 0
HEMOPTYSIS: 0
COUGH: 0
LIGHT-HEADEDNESS: 0
HEMATOCHEZIA: 0
FEVER: 0
ENDOCRINE NEGATIVE: 1
SYNCOPE: 0

## 2019-09-12 ENCOUNTER — ANCILLARY PROCEDURE (OUTPATIENT)
Dept: CARDIOLOGY | Age: 64
End: 2019-09-12
Attending: INTERNAL MEDICINE

## 2019-09-12 DIAGNOSIS — I25.10 CORONARY ARTERY CALCIFICATION SEEN ON CT SCAN: ICD-10-CM

## 2019-09-12 DIAGNOSIS — I10 BENIGN ESSENTIAL HYPERTENSION: ICD-10-CM

## 2019-09-12 DIAGNOSIS — E78.2 MIXED HYPERLIPIDEMIA: ICD-10-CM

## 2019-09-12 DIAGNOSIS — Z82.49 FAMILY HISTORY OF CORONARY ARTERIOSCLEROSIS: ICD-10-CM

## 2019-09-12 DIAGNOSIS — G47.33 OSA ON CPAP: ICD-10-CM

## 2019-09-12 PROCEDURE — 93880 EXTRACRANIAL BILAT STUDY: CPT | Performed by: INTERNAL MEDICINE

## 2019-09-13 ENCOUNTER — TELEPHONE (OUTPATIENT)
Dept: CARDIOLOGY | Age: 64
End: 2019-09-13

## 2020-07-02 DIAGNOSIS — I10 BENIGN ESSENTIAL HYPERTENSION: ICD-10-CM

## 2020-07-02 DIAGNOSIS — E78.2 MIXED HYPERLIPIDEMIA: ICD-10-CM

## 2020-07-02 RX ORDER — ATORVASTATIN CALCIUM 20 MG/1
20 TABLET, FILM COATED ORAL DAILY
Qty: 90 TABLET | Refills: 0 | Status: SHIPPED | OUTPATIENT
Start: 2020-07-02 | End: 2020-09-24

## 2020-07-02 RX ORDER — LISINOPRIL 10 MG/1
10 TABLET ORAL DAILY
Qty: 90 TABLET | Refills: 0 | Status: SHIPPED | OUTPATIENT
Start: 2020-07-02 | End: 2020-09-24

## 2020-09-02 ENCOUNTER — LAB SERVICES (OUTPATIENT)
Dept: LAB | Age: 65
End: 2020-09-02

## 2020-09-02 ENCOUNTER — ANCILLARY PROCEDURE (OUTPATIENT)
Dept: CARDIOLOGY | Age: 65
End: 2020-09-02
Attending: INTERNAL MEDICINE

## 2020-09-02 DIAGNOSIS — Z82.49 FAMILY HISTORY OF CORONARY ARTERIOSCLEROSIS: ICD-10-CM

## 2020-09-02 DIAGNOSIS — E78.2 MIXED HYPERLIPIDEMIA: ICD-10-CM

## 2020-09-02 DIAGNOSIS — G47.33 OSA ON CPAP: ICD-10-CM

## 2020-09-02 DIAGNOSIS — I25.10 CORONARY ARTERY CALCIFICATION SEEN ON CT SCAN: ICD-10-CM

## 2020-09-02 DIAGNOSIS — I10 BENIGN ESSENTIAL HYPERTENSION: ICD-10-CM

## 2020-09-02 LAB
ALBUMIN SERPL-MCNC: 4.4 G/DL (ref 3.6–5.1)
ALBUMIN/GLOB SERPL: 1.4 {RATIO} (ref 1–2.4)
ALP SERPL-CCNC: 93 UNITS/L (ref 45–117)
ALT SERPL-CCNC: 24 UNITS/L
ANION GAP SERPL CALC-SCNC: 8 MMOL/L (ref 10–20)
AST SERPL-CCNC: 13 UNITS/L
BASOPHILS # BLD: 0.1 K/MCL (ref 0–0.3)
BASOPHILS NFR BLD: 1 %
BILIRUB SERPL-MCNC: 0.5 MG/DL (ref 0.2–1)
BUN SERPL-MCNC: 19 MG/DL (ref 6–20)
BUN/CREAT SERPL: 23 (ref 7–25)
CALCIUM SERPL-MCNC: 9 MG/DL (ref 8.4–10.2)
CHLORIDE SERPL-SCNC: 107 MMOL/L (ref 98–107)
CHOLEST SERPL-MCNC: 137 MG/DL
CHOLEST/HDLC SERPL: 3.1 {RATIO}
CO2 SERPL-SCNC: 27 MMOL/L (ref 21–32)
CREAT SERPL-MCNC: 0.81 MG/DL (ref 0.51–0.95)
DIFFERENTIAL METHOD BLD: NORMAL
EOSINOPHIL # BLD: 0.3 K/MCL (ref 0.1–0.5)
EOSINOPHIL NFR BLD: 6 %
ERYTHROCYTE [DISTWIDTH] IN BLOOD: 12.9 % (ref 11–15)
GLOBULIN SER-MCNC: 3.1 G/DL (ref 2–4)
GLUCOSE SERPL-MCNC: 91 MG/DL (ref 65–99)
HCT VFR BLD CALC: 44.6 % (ref 36–46.5)
HDLC SERPL-MCNC: 44 MG/DL
HGB BLD-MCNC: 14.6 G/DL (ref 12–15.5)
IMM GRANULOCYTES # BLD AUTO: 0 K/MCL (ref 0–0.2)
IMM GRANULOCYTES NFR BLD: 0 %
LDLC SERPL CALC-MCNC: 79 MG/DL
LENGTH OF FAST TIME PATIENT: 12 HRS
LENGTH OF FAST TIME PATIENT: 12 HRS
LYMPHOCYTES # BLD: 1.2 K/MCL (ref 1–4)
LYMPHOCYTES NFR BLD: 21 %
MCH RBC QN AUTO: 28.4 PG (ref 26–34)
MCHC RBC AUTO-ENTMCNC: 32.7 G/DL (ref 32–36.5)
MCV RBC AUTO: 86.8 FL (ref 78–100)
MONOCYTES # BLD: 0.7 K/MCL (ref 0.3–0.9)
MONOCYTES NFR BLD: 13 %
NEUTROPHILS # BLD: 3.4 K/MCL (ref 1.8–7.7)
NEUTROPHILS NFR BLD: 59 %
NONHDLC SERPL-MCNC: 93 MG/DL
NRBC BLD MANUAL-RTO: 0 /100 WBC
PLATELET # BLD: 265 K/MCL (ref 140–450)
POTASSIUM SERPL-SCNC: 4.5 MMOL/L (ref 3.4–5.1)
PROT SERPL-MCNC: 7.5 G/DL (ref 6.4–8.2)
RBC # BLD: 5.14 MIL/MCL (ref 4–5.2)
SODIUM SERPL-SCNC: 138 MMOL/L (ref 135–145)
TRIGL SERPL-MCNC: 70 MG/DL
WBC # BLD: 5.8 K/MCL (ref 4.2–11)

## 2020-09-02 PROCEDURE — 93306 TTE W/DOPPLER COMPLETE: CPT | Performed by: INTERNAL MEDICINE

## 2020-09-08 PROBLEM — I73.00 RAYNAUD'S SYNDROME WITHOUT GANGRENE: Status: ACTIVE | Noted: 2020-09-08

## 2020-09-10 ENCOUNTER — OFFICE VISIT (OUTPATIENT)
Dept: CARDIOLOGY | Age: 65
End: 2020-09-10

## 2020-09-10 VITALS
BODY MASS INDEX: 37.22 KG/M2 | SYSTOLIC BLOOD PRESSURE: 136 MMHG | HEIGHT: 64 IN | RESPIRATION RATE: 16 BRPM | HEART RATE: 68 BPM | DIASTOLIC BLOOD PRESSURE: 72 MMHG | WEIGHT: 218 LBS

## 2020-09-10 DIAGNOSIS — G47.33 OSA ON CPAP: Primary | ICD-10-CM

## 2020-09-10 DIAGNOSIS — I25.10 CORONARY ARTERY CALCIFICATION SEEN ON CT SCAN: ICD-10-CM

## 2020-09-10 DIAGNOSIS — E55.9 HYPOVITAMINOSIS D: ICD-10-CM

## 2020-09-10 DIAGNOSIS — E78.2 MIXED HYPERLIPIDEMIA: ICD-10-CM

## 2020-09-10 DIAGNOSIS — I10 BENIGN ESSENTIAL HYPERTENSION: ICD-10-CM

## 2020-09-10 PROCEDURE — 99214 OFFICE O/P EST MOD 30 MIN: CPT | Performed by: INTERNAL MEDICINE

## 2020-09-10 RX ORDER — TURMERIC ROOT EXTRACT 500 MG
1500 TABLET ORAL 2 TIMES DAILY
Status: ON HOLD | COMMUNITY
End: 2023-07-26 | Stop reason: HOSPADM

## 2020-09-10 RX ORDER — GUAIFENESIN/EPHEDRINE HCL 200-12.5MG
TABLET ORAL DAILY
COMMUNITY
End: 2022-10-04

## 2020-09-10 SDOH — HEALTH STABILITY: MENTAL HEALTH: HOW OFTEN DO YOU HAVE A DRINK CONTAINING ALCOHOL?: MONTHLY OR LESS

## 2020-09-10 SDOH — HEALTH STABILITY: PHYSICAL HEALTH: ON AVERAGE, HOW MANY DAYS PER WEEK DO YOU ENGAGE IN MODERATE TO STRENUOUS EXERCISE (LIKE A BRISK WALK)?: 0 DAYS

## 2020-09-10 ASSESSMENT — ENCOUNTER SYMPTOMS
DIZZINESS: 0
SYNCOPE: 0
SUSPICIOUS LESIONS: 0
LIGHT-HEADEDNESS: 0
WEIGHT LOSS: 0
WEIGHT GAIN: 0
COUGH: 0
ENDOCRINE NEGATIVE: 1
SHORTNESS OF BREATH: 0
FEVER: 0
BRUISES/BLEEDS EASILY: 0
ABDOMINAL PAIN: 0
CHILLS: 0
PSYCHIATRIC NEGATIVE: 1
HEMOPTYSIS: 0
HEMATOCHEZIA: 0

## 2020-09-10 ASSESSMENT — PATIENT HEALTH QUESTIONNAIRE - PHQ9
CLINICAL INTERPRETATION OF PHQ2 SCORE: NO FURTHER SCREENING NEEDED
2. FEELING DOWN, DEPRESSED OR HOPELESS: NOT AT ALL
SUM OF ALL RESPONSES TO PHQ9 QUESTIONS 1 AND 2: 0
SUM OF ALL RESPONSES TO PHQ9 QUESTIONS 1 AND 2: 0
1. LITTLE INTEREST OR PLEASURE IN DOING THINGS: NOT AT ALL
CLINICAL INTERPRETATION OF PHQ9 SCORE: NO FURTHER SCREENING NEEDED

## 2020-09-24 ENCOUNTER — TELEPHONE (OUTPATIENT)
Dept: CARDIOLOGY | Age: 65
End: 2020-09-24

## 2020-09-24 DIAGNOSIS — E78.2 MIXED HYPERLIPIDEMIA: ICD-10-CM

## 2020-09-24 DIAGNOSIS — I10 BENIGN ESSENTIAL HYPERTENSION: ICD-10-CM

## 2020-09-24 RX ORDER — ATORVASTATIN CALCIUM 20 MG/1
20 TABLET, FILM COATED ORAL DAILY
Qty: 90 TABLET | Refills: 3 | Status: SHIPPED | OUTPATIENT
Start: 2020-09-24 | End: 2021-07-16 | Stop reason: SDUPTHER

## 2020-09-24 RX ORDER — LISINOPRIL 10 MG/1
10 TABLET ORAL DAILY
Qty: 90 TABLET | Refills: 3 | Status: SHIPPED | OUTPATIENT
Start: 2020-09-24 | End: 2021-07-16 | Stop reason: SDUPTHER

## 2020-10-05 ENCOUNTER — TELEPHONE (OUTPATIENT)
Dept: CARDIOLOGY | Age: 65
End: 2020-10-05

## 2020-10-05 ENCOUNTER — ANCILLARY PROCEDURE (OUTPATIENT)
Dept: CARDIOLOGY | Age: 65
End: 2020-10-05
Attending: INTERNAL MEDICINE

## 2020-10-05 VITALS — WEIGHT: 218 LBS | HEIGHT: 64 IN | BODY MASS INDEX: 37.22 KG/M2

## 2020-10-05 DIAGNOSIS — I10 BENIGN ESSENTIAL HYPERTENSION: ICD-10-CM

## 2020-10-05 DIAGNOSIS — G47.33 OSA ON CPAP: ICD-10-CM

## 2020-10-05 DIAGNOSIS — E78.2 MIXED HYPERLIPIDEMIA: ICD-10-CM

## 2020-10-05 DIAGNOSIS — I25.10 CORONARY ARTERY CALCIFICATION SEEN ON CT SCAN: ICD-10-CM

## 2020-10-05 LAB
HEART RATE RESERVE PREDICTED: 54.19 BPM
LV EF: 75 %
PEAK HR ACHIEVED: 71 BPM
RESTING HR ACHIEVED: 60 BPM
STRESS BASELINE BP: NORMAL MMHG
STRESS PERCENT HR: 46 %
STRESS POST EXERCISE DUR MIN: 1 MIN
STRESS POST PEAK BP: NORMAL MMHG
STRESS TARGET HR: 155 BPM

## 2020-10-05 PROCEDURE — 93016 CV STRESS TEST SUPVJ ONLY: CPT | Performed by: INTERNAL MEDICINE

## 2020-10-05 PROCEDURE — 93017 CV STRESS TEST TRACING ONLY: CPT | Performed by: INTERNAL MEDICINE

## 2020-10-05 PROCEDURE — A9502 TC99M TETROFOSMIN: HCPCS | Performed by: INTERNAL MEDICINE

## 2020-10-05 PROCEDURE — X1094 NO CHARGE VISIT: HCPCS | Performed by: INTERNAL MEDICINE

## 2020-10-05 PROCEDURE — 78452 HT MUSCLE IMAGE SPECT MULT: CPT | Performed by: INTERNAL MEDICINE

## 2020-10-05 PROCEDURE — 93018 CV STRESS TEST I&R ONLY: CPT | Performed by: INTERNAL MEDICINE

## 2020-10-05 RX ORDER — REGADENOSON 0.08 MG/ML
0.4 INJECTION, SOLUTION INTRAVENOUS ONCE
Status: COMPLETED | OUTPATIENT
Start: 2020-10-05 | End: 2020-10-05

## 2020-10-05 RX ADMIN — REGADENOSON 0.4 MG: 0.08 INJECTION, SOLUTION INTRAVENOUS at 09:15

## 2020-10-05 ASSESSMENT — EXERCISE STRESS TEST
STAGE_CATEGORIES: RESTING
PEAK_RPP: 7200
PEAK_BP: 100/60
PEAK_HR: 60
PEAK_RPP: 9840
PEAK_BP: 120/60
PEAK_BP: 120/60
PEAK_RPP: 7800
STRESS_SYMPTOMS: DYSPNEA
PEAK_BP: 120/72
STAGE_CATEGORIES: 1
PEAK_RPP: 10200
STAGE_CATEGORIES: RECOVERY 1
STAGE_CATEGORIES: RECOVERY 2
PEAK_HR: 85
PEAK_RPP: 10200
STAGE_CATEGORIES: RECOVERY 0
PEAK_HR: 78
PEAK_HR: 85
STRESS_SYMPTOMS: DYSPNEA
PEAK_BP: 120/60
PEAK_HR: 82
STRESS_SYMPTOMS: DYSPNEA

## 2021-03-10 ENCOUNTER — APPOINTMENT (OUTPATIENT)
Dept: CARDIOLOGY | Age: 66
End: 2021-03-10

## 2021-03-31 ENCOUNTER — LAB SERVICES (OUTPATIENT)
Dept: LAB | Age: 66
End: 2021-03-31

## 2021-03-31 DIAGNOSIS — E78.2 MIXED HYPERLIPIDEMIA: ICD-10-CM

## 2021-03-31 DIAGNOSIS — G47.33 OSA ON CPAP: ICD-10-CM

## 2021-03-31 DIAGNOSIS — I25.10 CORONARY ARTERY CALCIFICATION SEEN ON CT SCAN: ICD-10-CM

## 2021-03-31 DIAGNOSIS — E55.9 HYPOVITAMINOSIS D: ICD-10-CM

## 2021-03-31 DIAGNOSIS — I10 BENIGN ESSENTIAL HYPERTENSION: ICD-10-CM

## 2021-03-31 LAB
25(OH)D3+25(OH)D2 SERPL-MCNC: 52.6 NG/ML (ref 30–100)
BASOPHILS # BLD: 0.1 K/MCL (ref 0–0.3)
BASOPHILS NFR BLD: 1 %
DEPRECATED RDW RBC: 39.9 FL (ref 39–50)
EOSINOPHIL # BLD: 0.3 K/MCL (ref 0–0.5)
EOSINOPHIL NFR BLD: 5 %
ERYTHROCYTE [DISTWIDTH] IN BLOOD: 12.9 % (ref 11–15)
HCT VFR BLD CALC: 42.1 % (ref 36–46.5)
HGB BLD-MCNC: 13.7 G/DL (ref 12–15.5)
IMM GRANULOCYTES # BLD AUTO: 0 K/MCL (ref 0–0.2)
IMM GRANULOCYTES # BLD: 0 %
LYMPHOCYTES # BLD: 1.3 K/MCL (ref 1–4)
LYMPHOCYTES NFR BLD: 23 %
MCH RBC QN AUTO: 27.9 PG (ref 26–34)
MCHC RBC AUTO-ENTMCNC: 32.5 G/DL (ref 32–36.5)
MCV RBC AUTO: 85.7 FL (ref 78–100)
MONOCYTES # BLD: 0.7 K/MCL (ref 0.3–0.9)
MONOCYTES NFR BLD: 12 %
NEUTROPHILS # BLD: 3.3 K/MCL (ref 1.8–7.7)
NEUTROPHILS NFR BLD: 59 %
NRBC BLD MANUAL-RTO: 0 /100 WBC
PLATELET # BLD AUTO: 268 K/MCL (ref 140–450)
RBC # BLD: 4.91 MIL/MCL (ref 4–5.2)
WBC # BLD: 5.7 K/MCL (ref 4.2–11)

## 2021-03-31 PROCEDURE — 85025 COMPLETE CBC W/AUTO DIFF WBC: CPT | Performed by: CLINICAL MEDICAL LABORATORY

## 2021-03-31 PROCEDURE — 82306 VITAMIN D 25 HYDROXY: CPT | Performed by: CLINICAL MEDICAL LABORATORY

## 2021-03-31 PROCEDURE — 36415 COLL VENOUS BLD VENIPUNCTURE: CPT | Performed by: CLINICAL MEDICAL LABORATORY

## 2021-04-07 ENCOUNTER — OFFICE VISIT (OUTPATIENT)
Dept: CARDIOLOGY | Age: 66
End: 2021-04-07

## 2021-04-07 VITALS
HEIGHT: 64 IN | DIASTOLIC BLOOD PRESSURE: 72 MMHG | WEIGHT: 220.6 LBS | BODY MASS INDEX: 37.66 KG/M2 | SYSTOLIC BLOOD PRESSURE: 124 MMHG | HEART RATE: 70 BPM

## 2021-04-07 DIAGNOSIS — E55.9 HYPOVITAMINOSIS D: ICD-10-CM

## 2021-04-07 DIAGNOSIS — G47.33 OSA ON CPAP: Primary | ICD-10-CM

## 2021-04-07 DIAGNOSIS — I25.10 CORONARY ARTERY CALCIFICATION SEEN ON CT SCAN: ICD-10-CM

## 2021-04-07 DIAGNOSIS — I10 BENIGN ESSENTIAL HYPERTENSION: ICD-10-CM

## 2021-04-07 DIAGNOSIS — I73.00 RAYNAUD'S SYNDROME WITHOUT GANGRENE: ICD-10-CM

## 2021-04-07 PROCEDURE — 99214 OFFICE O/P EST MOD 30 MIN: CPT | Performed by: INTERNAL MEDICINE

## 2021-04-07 SDOH — HEALTH STABILITY: PHYSICAL HEALTH: ON AVERAGE, HOW MANY MINUTES DO YOU ENGAGE IN EXERCISE AT THIS LEVEL?: 0 MIN

## 2021-04-07 SDOH — HEALTH STABILITY: PHYSICAL HEALTH: ON AVERAGE, HOW MANY DAYS PER WEEK DO YOU ENGAGE IN MODERATE TO STRENUOUS EXERCISE (LIKE A BRISK WALK)?: 0 DAYS

## 2021-04-07 ASSESSMENT — PATIENT HEALTH QUESTIONNAIRE - PHQ9
2. FEELING DOWN, DEPRESSED OR HOPELESS: NOT AT ALL
SUM OF ALL RESPONSES TO PHQ9 QUESTIONS 1 AND 2: 0
CLINICAL INTERPRETATION OF PHQ9 SCORE: NO FURTHER SCREENING NEEDED
SUM OF ALL RESPONSES TO PHQ9 QUESTIONS 1 AND 2: 0
CLINICAL INTERPRETATION OF PHQ2 SCORE: NO FURTHER SCREENING NEEDED
1. LITTLE INTEREST OR PLEASURE IN DOING THINGS: NOT AT ALL

## 2021-05-11 ENCOUNTER — TELEPHONE (OUTPATIENT)
Dept: OTHER | Age: 66
End: 2021-05-11

## 2021-05-14 ENCOUNTER — TELEPHONE (OUTPATIENT)
Dept: OTHER | Age: 66
End: 2021-05-14

## 2021-05-19 ENCOUNTER — TELEPHONE (OUTPATIENT)
Dept: OTHER | Age: 66
End: 2021-05-19

## 2021-07-16 DIAGNOSIS — E78.2 MIXED HYPERLIPIDEMIA: ICD-10-CM

## 2021-07-16 DIAGNOSIS — I10 BENIGN ESSENTIAL HYPERTENSION: ICD-10-CM

## 2021-07-16 RX ORDER — LISINOPRIL 10 MG/1
10 TABLET ORAL DAILY
Qty: 90 TABLET | Refills: 3 | Status: SHIPPED | OUTPATIENT
Start: 2021-07-16 | End: 2022-07-20

## 2021-07-16 RX ORDER — ATORVASTATIN CALCIUM 20 MG/1
20 TABLET, FILM COATED ORAL DAILY
Qty: 90 TABLET | Refills: 3 | Status: SHIPPED | OUTPATIENT
Start: 2021-07-16 | End: 2022-07-20

## 2021-10-07 ENCOUNTER — LAB SERVICES (OUTPATIENT)
Dept: LAB | Age: 66
End: 2021-10-07

## 2021-10-07 DIAGNOSIS — E55.9 HYPOVITAMINOSIS D: ICD-10-CM

## 2021-10-07 DIAGNOSIS — G47.33 OSA ON CPAP: ICD-10-CM

## 2021-10-07 DIAGNOSIS — I25.10 CORONARY ARTERY CALCIFICATION SEEN ON CT SCAN: ICD-10-CM

## 2021-10-07 DIAGNOSIS — I10 BENIGN ESSENTIAL HYPERTENSION: ICD-10-CM

## 2021-10-07 DIAGNOSIS — I73.00 RAYNAUD'S SYNDROME WITHOUT GANGRENE: ICD-10-CM

## 2021-10-07 LAB
25(OH)D3+25(OH)D2 SERPL-MCNC: 66.3 NG/ML (ref 30–100)
ALBUMIN SERPL-MCNC: 4.4 G/DL (ref 3.6–5.1)
ALBUMIN/GLOB SERPL: 1.5 {RATIO} (ref 1–2.4)
ALP SERPL-CCNC: 79 UNITS/L (ref 45–117)
ALT SERPL-CCNC: 23 UNITS/L
ANION GAP SERPL CALC-SCNC: 9 MMOL/L (ref 10–20)
AST SERPL-CCNC: 11 UNITS/L
BASOPHILS # BLD: 0.1 K/MCL (ref 0–0.3)
BASOPHILS NFR BLD: 2 %
BILIRUB SERPL-MCNC: 0.5 MG/DL (ref 0.2–1)
BUN SERPL-MCNC: 31 MG/DL (ref 6–20)
BUN/CREAT SERPL: 37 (ref 7–25)
CALCIUM SERPL-MCNC: 9.2 MG/DL (ref 8.4–10.2)
CHLORIDE SERPL-SCNC: 106 MMOL/L (ref 98–107)
CHOLEST SERPL-MCNC: 136 MG/DL
CHOLEST/HDLC SERPL: 2.9 {RATIO}
CO2 SERPL-SCNC: 27 MMOL/L (ref 21–32)
CREAT SERPL-MCNC: 0.84 MG/DL (ref 0.51–0.95)
DEPRECATED RDW RBC: 40.6 FL (ref 39–50)
EOSINOPHIL # BLD: 0.4 K/MCL (ref 0–0.5)
EOSINOPHIL NFR BLD: 6 %
ERYTHROCYTE [DISTWIDTH] IN BLOOD: 13 % (ref 11–15)
FASTING DURATION TIME PATIENT: 12 HOURS (ref 0–999)
FASTING DURATION TIME PATIENT: 12 HOURS (ref 0–999)
GFR SERPLBLD BASED ON 1.73 SQ M-ARVRAT: 73 ML/MIN
GLOBULIN SER-MCNC: 3 G/DL (ref 2–4)
GLUCOSE SERPL-MCNC: 94 MG/DL (ref 70–99)
HCT VFR BLD CALC: 45.2 % (ref 36–46.5)
HDLC SERPL-MCNC: 47 MG/DL
HGB BLD-MCNC: 14.4 G/DL (ref 12–15.5)
IMM GRANULOCYTES # BLD AUTO: 0 K/MCL (ref 0–0.2)
IMM GRANULOCYTES # BLD: 0 %
LDLC SERPL CALC-MCNC: 67 MG/DL
LYMPHOCYTES # BLD: 1.3 K/MCL (ref 1–4)
LYMPHOCYTES NFR BLD: 20 %
MCH RBC QN AUTO: 27.5 PG (ref 26–34)
MCHC RBC AUTO-ENTMCNC: 31.9 G/DL (ref 32–36.5)
MCV RBC AUTO: 86.3 FL (ref 78–100)
MONOCYTES # BLD: 0.7 K/MCL (ref 0.3–0.9)
MONOCYTES NFR BLD: 12 %
NEUTROPHILS # BLD: 3.8 K/MCL (ref 1.8–7.7)
NEUTROPHILS NFR BLD: 60 %
NONHDLC SERPL-MCNC: 89 MG/DL
NRBC BLD MANUAL-RTO: 0 /100 WBC
PLATELET # BLD AUTO: 293 K/MCL (ref 140–450)
POTASSIUM SERPL-SCNC: 4.8 MMOL/L (ref 3.4–5.1)
PROT SERPL-MCNC: 7.4 G/DL (ref 6.4–8.2)
RBC # BLD: 5.24 MIL/MCL (ref 4–5.2)
SODIUM SERPL-SCNC: 137 MMOL/L (ref 135–145)
TRIGL SERPL-MCNC: 109 MG/DL
WBC # BLD: 6.3 K/MCL (ref 4.2–11)

## 2021-10-07 PROCEDURE — 85025 COMPLETE CBC W/AUTO DIFF WBC: CPT | Performed by: CLINICAL MEDICAL LABORATORY

## 2021-10-07 PROCEDURE — 82306 VITAMIN D 25 HYDROXY: CPT | Performed by: CLINICAL MEDICAL LABORATORY

## 2021-10-07 PROCEDURE — 80053 COMPREHEN METABOLIC PANEL: CPT | Performed by: CLINICAL MEDICAL LABORATORY

## 2021-10-07 PROCEDURE — 36415 COLL VENOUS BLD VENIPUNCTURE: CPT | Performed by: CLINICAL MEDICAL LABORATORY

## 2021-10-07 PROCEDURE — 80061 LIPID PANEL: CPT | Performed by: CLINICAL MEDICAL LABORATORY

## 2021-10-12 ENCOUNTER — OFFICE VISIT (OUTPATIENT)
Dept: CARDIOLOGY | Age: 66
End: 2021-10-12

## 2021-10-12 VITALS
WEIGHT: 217 LBS | HEIGHT: 64 IN | HEART RATE: 74 BPM | SYSTOLIC BLOOD PRESSURE: 118 MMHG | DIASTOLIC BLOOD PRESSURE: 72 MMHG | BODY MASS INDEX: 37.05 KG/M2

## 2021-10-12 DIAGNOSIS — G47.33 OSA ON CPAP: ICD-10-CM

## 2021-10-12 DIAGNOSIS — I65.23 CAROTID ARTERY CALCIFICATION, BILATERAL: ICD-10-CM

## 2021-10-12 DIAGNOSIS — I25.10 CORONARY ARTERY CALCIFICATION SEEN ON CT SCAN: ICD-10-CM

## 2021-10-12 DIAGNOSIS — E55.9 HYPOVITAMINOSIS D: ICD-10-CM

## 2021-10-12 DIAGNOSIS — I10 BENIGN ESSENTIAL HYPERTENSION: ICD-10-CM

## 2021-10-12 DIAGNOSIS — E78.2 MIXED HYPERLIPIDEMIA: Primary | ICD-10-CM

## 2021-10-12 PROCEDURE — 99214 OFFICE O/P EST MOD 30 MIN: CPT | Performed by: INTERNAL MEDICINE

## 2021-10-12 SDOH — HEALTH STABILITY: PHYSICAL HEALTH: ON AVERAGE, HOW MANY DAYS PER WEEK DO YOU ENGAGE IN MODERATE TO STRENUOUS EXERCISE (LIKE A BRISK WALK)?: 0 DAYS

## 2021-10-12 SDOH — HEALTH STABILITY: PHYSICAL HEALTH: ON AVERAGE, HOW MANY MINUTES DO YOU ENGAGE IN EXERCISE AT THIS LEVEL?: 0 MIN

## 2021-10-12 ASSESSMENT — PATIENT HEALTH QUESTIONNAIRE - PHQ9
CLINICAL INTERPRETATION OF PHQ2 SCORE: NO FURTHER SCREENING NEEDED
SUM OF ALL RESPONSES TO PHQ9 QUESTIONS 1 AND 2: 0
SUM OF ALL RESPONSES TO PHQ9 QUESTIONS 1 AND 2: 0
CLINICAL INTERPRETATION OF PHQ9 SCORE: NO FURTHER SCREENING NEEDED
1. LITTLE INTEREST OR PLEASURE IN DOING THINGS: NOT AT ALL
2. FEELING DOWN, DEPRESSED OR HOPELESS: NOT AT ALL

## 2021-10-21 ENCOUNTER — TELEPHONE (OUTPATIENT)
Dept: FAMILY MEDICINE | Age: 66
End: 2021-10-21

## 2022-07-20 DIAGNOSIS — E78.2 MIXED HYPERLIPIDEMIA: ICD-10-CM

## 2022-07-20 DIAGNOSIS — I10 BENIGN ESSENTIAL HYPERTENSION: ICD-10-CM

## 2022-07-20 RX ORDER — ATORVASTATIN CALCIUM 20 MG/1
TABLET, FILM COATED ORAL
Qty: 90 TABLET | Refills: 3 | Status: SHIPPED | OUTPATIENT
Start: 2022-07-20 | End: 2023-07-18

## 2022-07-20 RX ORDER — LISINOPRIL 10 MG/1
TABLET ORAL
Qty: 90 TABLET | Refills: 3 | Status: SHIPPED | OUTPATIENT
Start: 2022-07-20 | End: 2023-07-18

## 2022-09-27 ENCOUNTER — LAB SERVICES (OUTPATIENT)
Dept: LAB | Age: 67
End: 2022-09-27

## 2022-09-27 ENCOUNTER — ANCILLARY PROCEDURE (OUTPATIENT)
Dept: CARDIOLOGY | Age: 67
End: 2022-09-27
Attending: INTERNAL MEDICINE

## 2022-09-27 ENCOUNTER — APPOINTMENT (OUTPATIENT)
Dept: LAB | Age: 67
End: 2022-09-27

## 2022-09-27 DIAGNOSIS — E78.2 MIXED HYPERLIPIDEMIA: ICD-10-CM

## 2022-09-27 DIAGNOSIS — I25.10 CORONARY ARTERY CALCIFICATION SEEN ON CT SCAN: ICD-10-CM

## 2022-09-27 DIAGNOSIS — I10 BENIGN ESSENTIAL HYPERTENSION: ICD-10-CM

## 2022-09-27 DIAGNOSIS — I65.23 CAROTID ARTERY CALCIFICATION, BILATERAL: ICD-10-CM

## 2022-09-27 DIAGNOSIS — E55.9 HYPOVITAMINOSIS D: ICD-10-CM

## 2022-09-27 DIAGNOSIS — G47.33 OSA ON CPAP: ICD-10-CM

## 2022-09-27 LAB
25(OH)D3+25(OH)D2 SERPL-MCNC: 115.9 NG/ML (ref 30–100)
ALBUMIN SERPL-MCNC: 4.1 G/DL (ref 3.6–5.1)
ALBUMIN/GLOB SERPL: 1.4 {RATIO} (ref 1–2.4)
ALP SERPL-CCNC: 79 UNITS/L (ref 45–117)
ALT SERPL-CCNC: 22 UNITS/L
ANION GAP SERPL CALC-SCNC: 10 MMOL/L (ref 7–19)
AORTIC VALVE AREA: 1.19
ASCENDING AORTA (AAD): 3.8
AST SERPL-CCNC: 13 UNITS/L
AV MEAN GRADIENT (AVMG): 19
AV MEAN VELOCITY (AVMV): 2.02
AV PEAK GRADIENT (AVPG): 33
AV PEAK VELOCITY (AVPV): 2.89
AV STENOSIS SEVERITY TEXT: NORMAL
BASOPHILS # BLD: 0.1 K/MCL (ref 0–0.3)
BASOPHILS NFR BLD: 1 %
BILIRUB SERPL-MCNC: 0.5 MG/DL (ref 0.2–1)
BUN SERPL-MCNC: 28 MG/DL (ref 6–20)
BUN/CREAT SERPL: 43 (ref 7–25)
CALCIUM SERPL-MCNC: 9.5 MG/DL (ref 8.4–10.2)
CHLORIDE SERPL-SCNC: 108 MMOL/L (ref 97–110)
CHOLEST SERPL-MCNC: 143 MG/DL
CHOLEST/HDLC SERPL: 3.3 {RATIO}
CO2 SERPL-SCNC: 27 MMOL/L (ref 21–32)
CREAT SERPL-MCNC: 0.65 MG/DL (ref 0.51–0.95)
DEPRECATED RDW RBC: 45.5 FL (ref 39–50)
DOP CALC LVOT PEAK VEL (LVOTPV): 0.97
E WAVE DECELARATION TIME (MDT): 362
EOSINOPHIL # BLD: 0.3 K/MCL (ref 0–0.5)
EOSINOPHIL NFR BLD: 4 %
ERYTHROCYTE [DISTWIDTH] IN BLOOD: 13.6 % (ref 11–15)
EST RIGHT VENT SYSTOLIC PRESSURE BY TRICUSPID REGURGITATION JET (RVSP): 24
FASTING DURATION TIME PATIENT: ABNORMAL H
FASTING DURATION TIME PATIENT: ABNORMAL H
GFR SERPLBLD BASED ON 1.73 SQ M-ARVRAT: >90 ML/MIN
GLOBULIN SER-MCNC: 2.9 G/DL (ref 2–4)
GLUCOSE SERPL-MCNC: 90 MG/DL (ref 70–99)
HCT VFR BLD CALC: 44.2 % (ref 36–46.5)
HDLC SERPL-MCNC: 44 MG/DL
HGB BLD-MCNC: 14 G/DL (ref 12–15.5)
IMM GRANULOCYTES # BLD AUTO: 0 K/MCL (ref 0–0.2)
IMM GRANULOCYTES # BLD: 0 %
INTERVENTRICULAR SEPTUM IN END DIASTOLE (IVSD): 0.7
LDLC SERPL CALC-MCNC: 86 MG/DL
LEFT INTERNAL DIMENSION IN SYSTOLE (LVSD): 3.3
LEFT VENTRICULAR INTERNAL DIMENSION IN DIASTOLE (LVDD): 4.9
LEFT VENTRICULAR POSTERIOR WALL IN END DIASTOLE (LVPW): 0.9
LV EF: NORMAL %
LVOT 2D (LVOTD): 2
LVOT VTI (LVOTVTI): 25.8
LYMPHOCYTES # BLD: 1 K/MCL (ref 1–4)
LYMPHOCYTES NFR BLD: 15 %
MCH RBC QN AUTO: 28.5 PG (ref 26–34)
MCHC RBC AUTO-ENTMCNC: 31.7 G/DL (ref 32–36.5)
MCV RBC AUTO: 90 FL (ref 78–100)
MONOCYTES # BLD: 0.6 K/MCL (ref 0.3–0.9)
MONOCYTES NFR BLD: 9 %
MV E TISSUE VEL LAT (MELV): 10.9
MV E TISSUE VEL MED (MESV): 9.49
MV E WAVE VEL/E TISSUE VEL MED(MSR): 9
MV PEAK A VELOCITY (MVPAV): 1.2
MV PEAK E VELOCITY (MVPEV): 0.85
NEUTROPHILS # BLD: 4.8 K/MCL (ref 1.8–7.7)
NEUTROPHILS NFR BLD: 71 %
NONHDLC SERPL-MCNC: 99 MG/DL
NRBC BLD MANUAL-RTO: 0 /100 WBC
PLATELET # BLD AUTO: 270 K/MCL (ref 140–450)
POTASSIUM SERPL-SCNC: 4.6 MMOL/L (ref 3.4–5.1)
PROT SERPL-MCNC: 7 G/DL (ref 6.4–8.2)
PV PEAK VELOCITY (PVPV): 0.69
RBC # BLD: 4.91 MIL/MCL (ref 4–5.2)
SODIUM SERPL-SCNC: 140 MMOL/L (ref 135–145)
TRICUSPID ANNULAR PLANE SYSTOLIC EXCURSION (TAPSE): 2.26
TRICUSPID VALVE ANNULAR PEAK VELOCITY (TVAPV): 13.4
TRICUSPID VALVE PEAK REGURGITATION VELOCITY (TRPV): 2.24
TRIGL SERPL-MCNC: 65 MG/DL
WBC # BLD: 6.7 K/MCL (ref 4.2–11)

## 2022-09-27 PROCEDURE — 80061 LIPID PANEL: CPT | Performed by: CLINICAL MEDICAL LABORATORY

## 2022-09-27 PROCEDURE — 76376 3D RENDER W/INTRP POSTPROCES: CPT | Performed by: INTERNAL MEDICINE

## 2022-09-27 PROCEDURE — 36415 COLL VENOUS BLD VENIPUNCTURE: CPT | Performed by: CLINICAL MEDICAL LABORATORY

## 2022-09-27 PROCEDURE — 85025 COMPLETE CBC W/AUTO DIFF WBC: CPT | Performed by: CLINICAL MEDICAL LABORATORY

## 2022-09-27 PROCEDURE — 80053 COMPREHEN METABOLIC PANEL: CPT | Performed by: CLINICAL MEDICAL LABORATORY

## 2022-09-27 PROCEDURE — 93880 EXTRACRANIAL BILAT STUDY: CPT | Performed by: INTERNAL MEDICINE

## 2022-09-27 PROCEDURE — 93306 TTE W/DOPPLER COMPLETE: CPT | Performed by: INTERNAL MEDICINE

## 2022-09-27 PROCEDURE — 82306 VITAMIN D 25 HYDROXY: CPT | Performed by: CLINICAL MEDICAL LABORATORY

## 2022-10-04 ENCOUNTER — OFFICE VISIT (OUTPATIENT)
Dept: CARDIOLOGY | Age: 67
End: 2022-10-04

## 2022-10-04 VITALS
SYSTOLIC BLOOD PRESSURE: 112 MMHG | BODY MASS INDEX: 30.77 KG/M2 | DIASTOLIC BLOOD PRESSURE: 71 MMHG | WEIGHT: 180.23 LBS | HEIGHT: 64 IN | HEART RATE: 54 BPM

## 2022-10-04 DIAGNOSIS — I73.00 RAYNAUD'S SYNDROME WITHOUT GANGRENE: Primary | ICD-10-CM

## 2022-10-04 DIAGNOSIS — E78.2 MIXED HYPERLIPIDEMIA: ICD-10-CM

## 2022-10-04 DIAGNOSIS — G47.33 OSA ON CPAP: ICD-10-CM

## 2022-10-04 DIAGNOSIS — I25.10 CORONARY ARTERY CALCIFICATION SEEN ON CT SCAN: ICD-10-CM

## 2022-10-04 DIAGNOSIS — E55.9 HYPOVITAMINOSIS D: ICD-10-CM

## 2022-10-04 DIAGNOSIS — I10 BENIGN ESSENTIAL HYPERTENSION: ICD-10-CM

## 2022-10-04 PROCEDURE — 99214 OFFICE O/P EST MOD 30 MIN: CPT | Performed by: INTERNAL MEDICINE

## 2022-10-04 SDOH — HEALTH STABILITY: PHYSICAL HEALTH: ON AVERAGE, HOW MANY DAYS PER WEEK DO YOU ENGAGE IN MODERATE TO STRENUOUS EXERCISE (LIKE A BRISK WALK)?: 0 DAYS

## 2022-10-04 SDOH — HEALTH STABILITY: PHYSICAL HEALTH: ON AVERAGE, HOW MANY MINUTES DO YOU ENGAGE IN EXERCISE AT THIS LEVEL?: 0 MIN

## 2022-10-04 ASSESSMENT — PATIENT HEALTH QUESTIONNAIRE - PHQ9
SUM OF ALL RESPONSES TO PHQ9 QUESTIONS 1 AND 2: 0
2. FEELING DOWN, DEPRESSED OR HOPELESS: NOT AT ALL
SUM OF ALL RESPONSES TO PHQ9 QUESTIONS 1 AND 2: 0
1. LITTLE INTEREST OR PLEASURE IN DOING THINGS: NOT AT ALL
CLINICAL INTERPRETATION OF PHQ2 SCORE: NO FURTHER SCREENING NEEDED

## 2022-10-11 ENCOUNTER — TELEPHONE (OUTPATIENT)
Dept: FAMILY MEDICINE | Age: 67
End: 2022-10-11

## 2022-12-16 ENCOUNTER — V-VISIT (OUTPATIENT)
Dept: FAMILY MEDICINE | Age: 67
End: 2022-12-16

## 2022-12-16 ENCOUNTER — LAB SERVICES (OUTPATIENT)
Dept: LAB | Age: 67
End: 2022-12-16

## 2022-12-16 DIAGNOSIS — I10 BENIGN ESSENTIAL HYPERTENSION: ICD-10-CM

## 2022-12-16 DIAGNOSIS — Z20.818 STREP THROAT EXPOSURE: Primary | ICD-10-CM

## 2022-12-16 DIAGNOSIS — E78.2 MIXED HYPERLIPIDEMIA: ICD-10-CM

## 2022-12-16 DIAGNOSIS — J02.9 SORE THROAT: ICD-10-CM

## 2022-12-16 DIAGNOSIS — J11.1 INFLUENZA-LIKE ILLNESS: ICD-10-CM

## 2022-12-16 DIAGNOSIS — G47.33 OSA ON CPAP: ICD-10-CM

## 2022-12-16 DIAGNOSIS — I73.00 RAYNAUD'S SYNDROME WITHOUT GANGRENE: ICD-10-CM

## 2022-12-16 DIAGNOSIS — E55.9 HYPOVITAMINOSIS D: ICD-10-CM

## 2022-12-16 DIAGNOSIS — I25.10 CORONARY ARTERY CALCIFICATION SEEN ON CT SCAN: ICD-10-CM

## 2022-12-16 LAB — 25(OH)D3+25(OH)D2 SERPL-MCNC: 69.2 NG/ML (ref 30–100)

## 2022-12-16 PROCEDURE — 36415 COLL VENOUS BLD VENIPUNCTURE: CPT | Performed by: CLINICAL MEDICAL LABORATORY

## 2022-12-16 PROCEDURE — 99214 OFFICE O/P EST MOD 30 MIN: CPT | Performed by: NURSE PRACTITIONER

## 2022-12-16 PROCEDURE — 82306 VITAMIN D 25 HYDROXY: CPT | Performed by: CLINICAL MEDICAL LABORATORY

## 2022-12-17 RX ORDER — OSELTAMIVIR PHOSPHATE 75 MG/1
75 CAPSULE ORAL 2 TIMES DAILY
Qty: 10 CAPSULE | Refills: 0 | Status: SHIPPED | OUTPATIENT
Start: 2022-12-17 | End: 2022-12-22

## 2022-12-17 RX ORDER — AMOXICILLIN 875 MG/1
875 TABLET, COATED ORAL 2 TIMES DAILY
Qty: 20 TABLET | Refills: 0 | Status: SHIPPED | OUTPATIENT
Start: 2022-12-17 | End: 2022-12-27

## 2023-01-09 ENCOUNTER — TELEPHONE (OUTPATIENT)
Dept: OTHER | Age: 68
End: 2023-01-09

## 2023-05-25 ENCOUNTER — WALK IN (OUTPATIENT)
Dept: URGENT CARE | Age: 68
End: 2023-05-25
Attending: EMERGENCY MEDICINE

## 2023-05-25 VITALS
HEART RATE: 66 BPM | WEIGHT: 175 LBS | BODY MASS INDEX: 30.04 KG/M2 | DIASTOLIC BLOOD PRESSURE: 79 MMHG | TEMPERATURE: 98 F | SYSTOLIC BLOOD PRESSURE: 133 MMHG | OXYGEN SATURATION: 99 % | RESPIRATION RATE: 16 BRPM

## 2023-05-25 DIAGNOSIS — H00.11 CHALAZION OF RIGHT UPPER EYELID: Primary | ICD-10-CM

## 2023-05-25 PROCEDURE — 99202 OFFICE O/P NEW SF 15 MIN: CPT

## 2023-05-25 RX ORDER — ERYTHROMYCIN 5 MG/G
OINTMENT OPHTHALMIC EVERY 6 HOURS
Qty: 3.5 G | Refills: 0 | Status: SHIPPED | OUTPATIENT
Start: 2023-05-25 | End: 2023-05-30

## 2023-05-25 ASSESSMENT — ENCOUNTER SYMPTOMS
COUGH: 0
BACK PAIN: 0
FEVER: 0

## 2023-05-25 ASSESSMENT — PAIN SCALES - GENERAL
PAINLEVEL: 0
PAINLEVEL_OUTOF10: 0
PAINLEVEL_OUTOF10: 0

## 2023-06-07 ENCOUNTER — TELEPHONE (OUTPATIENT)
Dept: OBGYN | Age: 68
End: 2023-06-07

## 2023-06-13 ENCOUNTER — OFFICE VISIT (OUTPATIENT)
Dept: OBGYN | Age: 68
End: 2023-06-13

## 2023-06-13 VITALS
WEIGHT: 177.69 LBS | OXYGEN SATURATION: 97 % | DIASTOLIC BLOOD PRESSURE: 95 MMHG | HEIGHT: 64 IN | BODY MASS INDEX: 30.34 KG/M2 | HEART RATE: 76 BPM | SYSTOLIC BLOOD PRESSURE: 165 MMHG | RESPIRATION RATE: 19 BRPM

## 2023-06-13 DIAGNOSIS — N95.0 PMB (POSTMENOPAUSAL BLEEDING): Primary | ICD-10-CM

## 2023-06-13 PROCEDURE — 99203 OFFICE O/P NEW LOW 30 MIN: CPT | Performed by: LEGAL MEDICINE

## 2023-06-13 PROCEDURE — 76830 TRANSVAGINAL US NON-OB: CPT | Performed by: LEGAL MEDICINE

## 2023-06-15 ENCOUNTER — TELEPHONE (OUTPATIENT)
Dept: CARDIOLOGY | Age: 68
End: 2023-06-15

## 2023-06-15 ENCOUNTER — PREP FOR CASE (OUTPATIENT)
Dept: OBGYN | Age: 68
End: 2023-06-15

## 2023-06-15 DIAGNOSIS — N95.0 PMB (POSTMENOPAUSAL BLEEDING): Primary | ICD-10-CM

## 2023-06-19 ENCOUNTER — OFFICE VISIT (OUTPATIENT)
Dept: OBGYN | Age: 68
End: 2023-06-19

## 2023-06-19 ENCOUNTER — PREP FOR CASE (OUTPATIENT)
Dept: OBGYN | Age: 68
End: 2023-06-19

## 2023-06-19 VITALS
HEIGHT: 64 IN | TEMPERATURE: 98.4 F | OXYGEN SATURATION: 97 % | HEART RATE: 66 BPM | RESPIRATION RATE: 19 BRPM | BODY MASS INDEX: 30.05 KG/M2 | SYSTOLIC BLOOD PRESSURE: 128 MMHG | WEIGHT: 176.04 LBS | DIASTOLIC BLOOD PRESSURE: 74 MMHG

## 2023-06-19 DIAGNOSIS — Z01.818 PREOP EXAMINATION: Primary | ICD-10-CM

## 2023-06-19 PROCEDURE — 99212 OFFICE O/P EST SF 10 MIN: CPT | Performed by: LEGAL MEDICINE

## 2023-06-19 RX ORDER — ACETAMINOPHEN 325 MG/1
650 TABLET ORAL ONCE
Status: CANCELLED | OUTPATIENT
Start: 2023-06-19 | End: 2023-06-19

## 2023-06-19 RX ORDER — GABAPENTIN 100 MG/1
300 CAPSULE ORAL ONCE
Status: CANCELLED | OUTPATIENT
Start: 2023-06-19 | End: 2023-06-19

## 2023-06-22 ENCOUNTER — OFFICE VISIT (OUTPATIENT)
Dept: CARDIOLOGY | Age: 68
End: 2023-06-22

## 2023-06-22 VITALS
SYSTOLIC BLOOD PRESSURE: 134 MMHG | DIASTOLIC BLOOD PRESSURE: 75 MMHG | OXYGEN SATURATION: 98 % | HEIGHT: 64 IN | BODY MASS INDEX: 30.3 KG/M2 | HEART RATE: 60 BPM | WEIGHT: 177.47 LBS

## 2023-06-22 DIAGNOSIS — I10 BENIGN ESSENTIAL HYPERTENSION: Primary | ICD-10-CM

## 2023-06-22 DIAGNOSIS — Z01.818 PREOP EXAMINATION: ICD-10-CM

## 2023-06-22 DIAGNOSIS — G47.33 OSA ON CPAP: ICD-10-CM

## 2023-06-22 PROCEDURE — 99214 OFFICE O/P EST MOD 30 MIN: CPT | Performed by: INTERNAL MEDICINE

## 2023-06-22 SDOH — HEALTH STABILITY: MENTAL HEALTH: DEPRESSION SCREENING SCORE: 0

## 2023-06-22 SDOH — HEALTH STABILITY: MENTAL HEALTH: LITTLE INTEREST OR PLEASURE IN ACTIVITY?: NOT AT ALL

## 2023-06-22 SDOH — HEALTH STABILITY: MENTAL HEALTH: FEELING DOWN, DEPRESSED OR HOPELESS?: NOT AT ALL

## 2023-06-22 SDOH — HEALTH STABILITY: MENTAL HEALTH: PHQ2 INTERPRETATION: NO FURTHER SCREENING NEEDED

## 2023-06-22 SDOH — HEALTH STABILITY: PHYSICAL HEALTH: ON AVERAGE, HOW MANY MINUTES DO YOU ENGAGE IN EXERCISE AT THIS LEVEL?: 0 MIN

## 2023-06-22 SDOH — HEALTH STABILITY: PHYSICAL HEALTH: ON AVERAGE, HOW MANY DAYS PER WEEK DO YOU ENGAGE IN MODERATE TO STRENUOUS EXERCISE (LIKE A BRISK WALK)?: 0 DAYS

## 2023-06-22 ASSESSMENT — PATIENT HEALTH QUESTIONNAIRE - PHQ9: SUM OF ALL RESPONSES TO PHQ9 QUESTIONS 1 AND 2: 0

## 2023-06-26 ENCOUNTER — TELEPHONE (OUTPATIENT)
Dept: CARDIOLOGY | Age: 68
End: 2023-06-26

## 2023-06-26 ENCOUNTER — LAB SERVICES (OUTPATIENT)
Dept: LAB | Age: 68
End: 2023-06-26

## 2023-06-26 DIAGNOSIS — N95.0 POST-MENOPAUSAL BLEEDING: ICD-10-CM

## 2023-06-26 DIAGNOSIS — I10 BENIGN ESSENTIAL HYPERTENSION: ICD-10-CM

## 2023-06-26 LAB
ANION GAP SERPL CALC-SCNC: 7 MMOL/L (ref 7–19)
BUN SERPL-MCNC: 30 MG/DL (ref 6–20)
BUN/CREAT SERPL: 45 (ref 7–25)
CALCIUM SERPL-MCNC: 9.8 MG/DL (ref 8.4–10.2)
CHLORIDE SERPL-SCNC: 108 MMOL/L (ref 97–110)
CO2 SERPL-SCNC: 27 MMOL/L (ref 21–32)
CREAT SERPL-MCNC: 0.66 MG/DL (ref 0.51–0.95)
FASTING DURATION TIME PATIENT: 0 HOURS (ref 0–999)
GFR SERPLBLD BASED ON 1.73 SQ M-ARVRAT: >90 ML/MIN
GLUCOSE SERPL-MCNC: 106 MG/DL (ref 70–99)
HGB BLD-MCNC: 14.4 G/DL (ref 12–15.5)
POTASSIUM SERPL-SCNC: 4.1 MMOL/L (ref 3.4–5.1)
SODIUM SERPL-SCNC: 138 MMOL/L (ref 135–145)

## 2023-06-26 PROCEDURE — 80048 BASIC METABOLIC PNL TOTAL CA: CPT | Performed by: CLINICAL MEDICAL LABORATORY

## 2023-06-26 PROCEDURE — 36415 COLL VENOUS BLD VENIPUNCTURE: CPT | Performed by: CLINICAL MEDICAL LABORATORY

## 2023-06-26 PROCEDURE — 85018 HEMOGLOBIN: CPT | Performed by: CLINICAL MEDICAL LABORATORY

## 2023-06-26 ASSESSMENT — ACTIVITIES OF DAILY LIVING (ADL)
SENSORY_SUPPORT_DEVICES: EYEGLASSES
MOBILITY_ASSIST_DEVICES: CANE;FOUR WHEEL WALKER
ADL_SCORE: 12
ADL_BEFORE_ADMISSION: INDEPENDENT

## 2023-06-27 ENCOUNTER — ANESTHESIA (OUTPATIENT)
Dept: SURGERY | Age: 68
End: 2023-06-27

## 2023-06-27 ENCOUNTER — HOSPITAL ENCOUNTER (OUTPATIENT)
Age: 68
Discharge: HOME | End: 2023-06-27
Attending: LEGAL MEDICINE | Admitting: LEGAL MEDICINE

## 2023-06-27 ENCOUNTER — ANESTHESIA EVENT (OUTPATIENT)
Dept: SURGERY | Age: 68
End: 2023-06-27

## 2023-06-27 DIAGNOSIS — N95.0 PMB (POSTMENOPAUSAL BLEEDING): ICD-10-CM

## 2023-06-27 DIAGNOSIS — I10 BENIGN ESSENTIAL HYPERTENSION: Primary | ICD-10-CM

## 2023-06-27 DIAGNOSIS — N95.0 POST-MENOPAUSAL BLEEDING: ICD-10-CM

## 2023-06-27 LAB
ATRIAL RATE (BPM): 60
P AXIS (DEGREES): 39
PR-INTERVAL (MSEC): 196
QRS-INTERVAL (MSEC): 90
QT-INTERVAL (MSEC): 414
QTC: 414
R AXIS (DEGREES): -32
REPORT TEXT: NORMAL
T AXIS (DEGREES): 0
VENTRICULAR RATE EKG/MIN (BPM): 60

## 2023-06-27 PROCEDURE — 13000002 HB ANESTHESIA  GENERAL   S/U + 1ST 15 MIN: Performed by: LEGAL MEDICINE

## 2023-06-27 PROCEDURE — 10002803 HB RX 637

## 2023-06-27 PROCEDURE — 10004451 HB PACU RECOVERY 1ST 30 MINUTES: Performed by: LEGAL MEDICINE

## 2023-06-27 PROCEDURE — 88342 IMHCHEM/IMCYTCHM 1ST ANTB: CPT | Performed by: LEGAL MEDICINE

## 2023-06-27 PROCEDURE — 10006023 HB SUPPLY 272: Performed by: LEGAL MEDICINE

## 2023-06-27 PROCEDURE — 10002800 HB RX 250 W HCPCS

## 2023-06-27 PROCEDURE — 81288 MLH1 GENE: CPT | Performed by: LEGAL MEDICINE

## 2023-06-27 PROCEDURE — 10002803 HB RX 637: Performed by: LEGAL MEDICINE

## 2023-06-27 PROCEDURE — 10002807 HB RX 258

## 2023-06-27 PROCEDURE — 10004651 HB RX, NO CHARGE ITEM: Performed by: LEGAL MEDICINE

## 2023-06-27 PROCEDURE — 10002801 HB RX 250 W/O HCPCS

## 2023-06-27 PROCEDURE — 13000003 HB ANESTHESIA  GENERAL EA ADD MINUTE: Performed by: LEGAL MEDICINE

## 2023-06-27 PROCEDURE — 13000037 HB COMPLEX CASE EACH ADD MINUTE: Performed by: LEGAL MEDICINE

## 2023-06-27 PROCEDURE — 88341 IMHCHEM/IMCYTCHM EA ADD ANTB: CPT | Performed by: LEGAL MEDICINE

## 2023-06-27 PROCEDURE — 93005 ELECTROCARDIOGRAM TRACING: CPT

## 2023-06-27 PROCEDURE — 13000003 HB ANESTHESIA  GENERAL EA ADD MINUTE: Performed by: LEGAL MEDICINE

## 2023-06-27 PROCEDURE — 13000001 HB PHASE II RECOVERY EA 30 MINUTES: Performed by: LEGAL MEDICINE

## 2023-06-27 PROCEDURE — 13000002 HB ANESTHESIA  GENERAL  S/U + 1ST 15 MIN: Performed by: LEGAL MEDICINE

## 2023-06-27 PROCEDURE — 13000036 HB COMPLEX  CASE S/U + 1ST 15 MIN: Performed by: LEGAL MEDICINE

## 2023-06-27 PROCEDURE — 58558 HYSTEROSCOPY BIOPSY: CPT | Performed by: LEGAL MEDICINE

## 2023-06-27 RX ORDER — METOCLOPRAMIDE HYDROCHLORIDE 5 MG/ML
5 INJECTION INTRAMUSCULAR; INTRAVENOUS
Status: DISCONTINUED | OUTPATIENT
Start: 2023-06-27 | End: 2023-06-27 | Stop reason: HOSPADM

## 2023-06-27 RX ORDER — HYDRALAZINE HYDROCHLORIDE 20 MG/ML
5 INJECTION INTRAMUSCULAR; INTRAVENOUS EVERY 10 MIN PRN
Status: DISCONTINUED | OUTPATIENT
Start: 2023-06-27 | End: 2023-06-27 | Stop reason: HOSPADM

## 2023-06-27 RX ORDER — FAMOTIDINE 20 MG/1
20 TABLET, FILM COATED ORAL
Status: COMPLETED | OUTPATIENT
Start: 2023-06-27 | End: 2023-06-27

## 2023-06-27 RX ORDER — HUMAN INSULIN 100 [IU]/ML
INJECTION, SOLUTION SUBCUTANEOUS
Status: DISCONTINUED | OUTPATIENT
Start: 2023-06-27 | End: 2023-06-27 | Stop reason: HOSPADM

## 2023-06-27 RX ORDER — SODIUM CHLORIDE, SODIUM LACTATE, POTASSIUM CHLORIDE, CALCIUM CHLORIDE 600; 310; 30; 20 MG/100ML; MG/100ML; MG/100ML; MG/100ML
INJECTION, SOLUTION INTRAVENOUS CONTINUOUS
Status: DISCONTINUED | OUTPATIENT
Start: 2023-06-27 | End: 2023-06-27 | Stop reason: HOSPADM

## 2023-06-27 RX ORDER — SCOLOPAMINE TRANSDERMAL SYSTEM 1 MG/1
1 PATCH, EXTENDED RELEASE TRANSDERMAL
Status: DISCONTINUED | OUTPATIENT
Start: 2023-06-27 | End: 2023-06-27 | Stop reason: HOSPADM

## 2023-06-27 RX ORDER — NICOTINE POLACRILEX 4 MG
30 LOZENGE BUCCAL
Status: DISCONTINUED | OUTPATIENT
Start: 2023-06-27 | End: 2023-06-27 | Stop reason: HOSPADM

## 2023-06-27 RX ORDER — PROPOFOL 10 MG/ML
INJECTION, EMULSION INTRAVENOUS PRN
Status: DISCONTINUED | OUTPATIENT
Start: 2023-06-27 | End: 2023-06-27

## 2023-06-27 RX ORDER — LIDOCAINE HYDROCHLORIDE 20 MG/ML
INJECTION, SOLUTION INFILTRATION; PERINEURAL PRN
Status: DISCONTINUED | OUTPATIENT
Start: 2023-06-27 | End: 2023-06-27

## 2023-06-27 RX ORDER — ONDANSETRON 2 MG/ML
INJECTION INTRAMUSCULAR; INTRAVENOUS PRN
Status: DISCONTINUED | OUTPATIENT
Start: 2023-06-27 | End: 2023-06-27

## 2023-06-27 RX ORDER — ACETAMINOPHEN 325 MG/1
650 TABLET ORAL ONCE
Status: COMPLETED | OUTPATIENT
Start: 2023-06-27 | End: 2023-06-27

## 2023-06-27 RX ORDER — GLYCOPYRROLATE 0.2 MG/ML
INJECTION, SOLUTION INTRAMUSCULAR; INTRAVENOUS PRN
Status: DISCONTINUED | OUTPATIENT
Start: 2023-06-27 | End: 2023-06-27

## 2023-06-27 RX ORDER — ONDANSETRON 2 MG/ML
4 INJECTION INTRAMUSCULAR; INTRAVENOUS
Status: DISCONTINUED | OUTPATIENT
Start: 2023-06-27 | End: 2023-06-27 | Stop reason: HOSPADM

## 2023-06-27 RX ORDER — DEXAMETHASONE SODIUM PHOSPHATE 4 MG/ML
INJECTION, SOLUTION INTRA-ARTICULAR; INTRALESIONAL; INTRAMUSCULAR; INTRAVENOUS; SOFT TISSUE PRN
Status: DISCONTINUED | OUTPATIENT
Start: 2023-06-27 | End: 2023-06-27

## 2023-06-27 RX ORDER — GABAPENTIN 300 MG/1
300 CAPSULE ORAL ONCE
Status: COMPLETED | OUTPATIENT
Start: 2023-06-27 | End: 2023-06-27

## 2023-06-27 RX ORDER — MIDAZOLAM HYDROCHLORIDE 1 MG/ML
INJECTION, SOLUTION INTRAMUSCULAR; INTRAVENOUS PRN
Status: DISCONTINUED | OUTPATIENT
Start: 2023-06-27 | End: 2023-06-27

## 2023-06-27 RX ORDER — DEXTROSE MONOHYDRATE 25 G/50ML
25 INJECTION, SOLUTION INTRAVENOUS PRN
Status: DISCONTINUED | OUTPATIENT
Start: 2023-06-27 | End: 2023-06-27 | Stop reason: HOSPADM

## 2023-06-27 RX ADMIN — FENTANYL CITRATE 50 MCG: 50 INJECTION, SOLUTION INTRAMUSCULAR; INTRAVENOUS at 10:38

## 2023-06-27 RX ADMIN — FENTANYL CITRATE 50 MCG: 50 INJECTION, SOLUTION INTRAMUSCULAR; INTRAVENOUS at 10:55

## 2023-06-27 RX ADMIN — SCOPALAMINE 1 PATCH: 1 PATCH, EXTENDED RELEASE TRANSDERMAL at 09:44

## 2023-06-27 RX ADMIN — PROPOFOL 180 MG: 10 INJECTION, EMULSION INTRAVENOUS at 10:40

## 2023-06-27 RX ADMIN — FAMOTIDINE 20 MG: 20 TABLET, FILM COATED ORAL at 09:44

## 2023-06-27 RX ADMIN — ONDANSETRON 4 MG: 2 INJECTION INTRAMUSCULAR; INTRAVENOUS at 11:20

## 2023-06-27 RX ADMIN — SODIUM CHLORIDE, POTASSIUM CHLORIDE, SODIUM LACTATE AND CALCIUM CHLORIDE: 600; 310; 30; 20 INJECTION, SOLUTION INTRAVENOUS at 09:44

## 2023-06-27 RX ADMIN — ACETAMINOPHEN 650 MG: 325 TABLET ORAL at 09:44

## 2023-06-27 RX ADMIN — MIDAZOLAM HYDROCHLORIDE 2 MG: 1 INJECTION, SOLUTION INTRAMUSCULAR; INTRAVENOUS at 10:38

## 2023-06-27 RX ADMIN — DEXAMETHASONE SODIUM PHOSPHATE 10 MG: 4 INJECTION, SOLUTION INTRAMUSCULAR; INTRAVENOUS at 10:52

## 2023-06-27 RX ADMIN — LIDOCAINE HYDROCHLORIDE 5 ML: 20 INJECTION, SOLUTION INFILTRATION; PERINEURAL at 10:40

## 2023-06-27 RX ADMIN — GABAPENTIN 300 MG: 300 CAPSULE ORAL at 09:44

## 2023-06-27 RX ADMIN — GLYCOPYRROLATE 0.2 MG: 0.2 INJECTION, SOLUTION INTRAMUSCULAR; INTRAVENOUS at 10:38

## 2023-06-27 SDOH — SOCIAL STABILITY: SOCIAL INSECURITY: RISK FACTORS: HEART DISEASE

## 2023-06-27 SDOH — SOCIAL STABILITY: SOCIAL INSECURITY: RISK FACTORS: BMI> 30 (OBESITY)

## 2023-06-27 ASSESSMENT — PAIN SCALES - GENERAL
PAINLEVEL_OUTOF10: 1
PAINLEVEL_OUTOF10: 0
PAINLEVEL_OUTOF10: 1
PAINLEVEL_OUTOF10: 3
PAINLEVEL_OUTOF10: 0
PAINLEVEL_OUTOF10: 0

## 2023-06-28 DIAGNOSIS — Z01.818 PREOP EXAMINATION: ICD-10-CM

## 2023-06-29 ENCOUNTER — CLINICAL DOCUMENTATION (OUTPATIENT)
Dept: OBGYN | Age: 68
End: 2023-06-29

## 2023-06-29 VITALS
WEIGHT: 175.49 LBS | OXYGEN SATURATION: 98 % | HEIGHT: 64 IN | HEART RATE: 70 BPM | SYSTOLIC BLOOD PRESSURE: 144 MMHG | BODY MASS INDEX: 29.96 KG/M2 | DIASTOLIC BLOOD PRESSURE: 81 MMHG | TEMPERATURE: 96.4 F | RESPIRATION RATE: 18 BRPM

## 2023-07-03 ENCOUNTER — EXTERNAL RECORD (OUTPATIENT)
Dept: HEALTH INFORMATION MANAGEMENT | Facility: OTHER | Age: 68
End: 2023-07-03

## 2023-07-03 ENCOUNTER — OFF PREMISE (OUTPATIENT)
Dept: CARDIOLOGY | Age: 68
End: 2023-07-03

## 2023-07-03 DIAGNOSIS — C54.1 ENDOMETRIAL CARCINOMA (CMD): Primary | ICD-10-CM

## 2023-07-03 DIAGNOSIS — Z80.41 FAMILY HISTORY OF OVARIAN CANCER: ICD-10-CM

## 2023-07-05 ENCOUNTER — CLINICAL DOCUMENTATION (OUTPATIENT)
Dept: CARDIOLOGY | Age: 68
End: 2023-07-05

## 2023-07-06 LAB
ASR DISCLAIMER: NORMAL
CASE RPRT: NORMAL
CLINICAL INFO: NORMAL
IHC PROGNOSTIC MARKER RESULT, ADDENDUM: NORMAL
PATH REPORT.FINAL DX SPEC: NORMAL
PATH REPORT.GROSS SPEC: NORMAL

## 2023-07-07 ENCOUNTER — OFFICE VISIT (OUTPATIENT)
Dept: OBGYN | Age: 68
End: 2023-07-07

## 2023-07-07 VITALS
DIASTOLIC BLOOD PRESSURE: 65 MMHG | BODY MASS INDEX: 30.84 KG/M2 | SYSTOLIC BLOOD PRESSURE: 132 MMHG | OXYGEN SATURATION: 99 % | HEART RATE: 69 BPM | WEIGHT: 179.68 LBS

## 2023-07-07 DIAGNOSIS — N95.0 POSTMENOPAUSAL BLEEDING: Primary | ICD-10-CM

## 2023-07-07 PROCEDURE — 99024 POSTOP FOLLOW-UP VISIT: CPT | Performed by: LEGAL MEDICINE

## 2023-07-10 ENCOUNTER — HOSPITAL ENCOUNTER (OUTPATIENT)
Dept: CT IMAGING | Age: 68
Discharge: HOME | End: 2023-07-10
Attending: OBSTETRICS & GYNECOLOGY

## 2023-07-10 DIAGNOSIS — C54.1 ENDOMETRIAL CARCINOMA (CMD): ICD-10-CM

## 2023-07-10 DIAGNOSIS — Z80.41 FAMILY HISTORY OF OVARIAN CANCER: ICD-10-CM

## 2023-07-10 PROCEDURE — 74176 CT ABD & PELVIS W/O CONTRAST: CPT

## 2023-07-10 PROCEDURE — G1004 CDSM NDSC: HCPCS

## 2023-07-10 PROCEDURE — 71250 CT THORAX DX C-: CPT

## 2023-07-11 ENCOUNTER — TELEPHONE (OUTPATIENT)
Dept: CARDIOLOGY | Age: 68
End: 2023-07-11

## 2023-07-13 LAB
MLH1 GENE METHYLATION BLD/T QL: POSITIVE
UNIQUE BAR CODE # CURRENT SAMPLE: ABNORMAL

## 2023-07-15 DIAGNOSIS — I10 BENIGN ESSENTIAL HYPERTENSION: ICD-10-CM

## 2023-07-15 DIAGNOSIS — E78.2 MIXED HYPERLIPIDEMIA: ICD-10-CM

## 2023-07-17 ENCOUNTER — OFF PREMISE (OUTPATIENT)
Dept: CARDIOLOGY | Age: 68
End: 2023-07-17

## 2023-07-17 ENCOUNTER — LAB SERVICES (OUTPATIENT)
Dept: LAB | Age: 68
End: 2023-07-17

## 2023-07-17 ENCOUNTER — EXTERNAL RECORD (OUTPATIENT)
Dept: HEALTH INFORMATION MANAGEMENT | Facility: OTHER | Age: 68
End: 2023-07-17

## 2023-07-17 DIAGNOSIS — C54.1 ENDOMETRIAL SARCOMA (CMD): Primary | ICD-10-CM

## 2023-07-17 DIAGNOSIS — Z80.41 FAMILY HISTORY OF OVARIAN CARCINOMA: ICD-10-CM

## 2023-07-17 LAB
ALBUMIN SERPL-MCNC: 4.2 G/DL (ref 3.6–5.1)
ALBUMIN/GLOB SERPL: 1.4 {RATIO} (ref 1–2.4)
ALP SERPL-CCNC: 75 UNITS/L (ref 45–117)
ALT SERPL-CCNC: 25 UNITS/L
ANION GAP SERPL CALC-SCNC: 10 MMOL/L (ref 7–19)
AST SERPL-CCNC: 16 UNITS/L
BASOPHILS # BLD: 0.1 K/MCL (ref 0–0.3)
BASOPHILS NFR BLD: 2 %
BILIRUB SERPL-MCNC: 0.5 MG/DL (ref 0.2–1)
BUN SERPL-MCNC: 30 MG/DL (ref 6–20)
BUN/CREAT SERPL: 41 (ref 7–25)
CALCIUM SERPL-MCNC: 9.1 MG/DL (ref 8.4–10.2)
CHLORIDE SERPL-SCNC: 106 MMOL/L (ref 97–110)
CO2 SERPL-SCNC: 25 MMOL/L (ref 21–32)
CREAT SERPL-MCNC: 0.74 MG/DL (ref 0.51–0.95)
DEPRECATED RDW RBC: 44.4 FL (ref 39–50)
EOSINOPHIL # BLD: 0.6 K/MCL (ref 0–0.5)
EOSINOPHIL NFR BLD: 9 %
ERYTHROCYTE [DISTWIDTH] IN BLOOD: 13.4 % (ref 11–15)
FASTING DURATION TIME PATIENT: ABNORMAL H
GFR SERPLBLD BASED ON 1.73 SQ M-ARVRAT: 88 ML/MIN
GLOBULIN SER-MCNC: 3.1 G/DL (ref 2–4)
GLUCOSE SERPL-MCNC: 88 MG/DL (ref 70–99)
HCT VFR BLD CALC: 44.2 % (ref 36–46.5)
HGB BLD-MCNC: 14.4 G/DL (ref 12–15.5)
IMM GRANULOCYTES # BLD AUTO: 0 K/MCL (ref 0–0.2)
IMM GRANULOCYTES # BLD: 0 %
LYMPHOCYTES # BLD: 1.3 K/MCL (ref 1–4)
LYMPHOCYTES NFR BLD: 21 %
MCH RBC QN AUTO: 29.4 PG (ref 26–34)
MCHC RBC AUTO-ENTMCNC: 32.6 G/DL (ref 32–36.5)
MCV RBC AUTO: 90.4 FL (ref 78–100)
MONOCYTES # BLD: 0.8 K/MCL (ref 0.3–0.9)
MONOCYTES NFR BLD: 14 %
NEUTROPHILS # BLD: 3.2 K/MCL (ref 1.8–7.7)
NEUTROPHILS NFR BLD: 54 %
NRBC BLD MANUAL-RTO: 0 /100 WBC
PLATELET # BLD AUTO: 243 K/MCL (ref 140–450)
POTASSIUM SERPL-SCNC: 4.5 MMOL/L (ref 3.4–5.1)
PROT SERPL-MCNC: 7.3 G/DL (ref 6.4–8.2)
RBC # BLD: 4.89 MIL/MCL (ref 4–5.2)
SODIUM SERPL-SCNC: 136 MMOL/L (ref 135–145)
WBC # BLD: 5.9 K/MCL (ref 4.2–11)

## 2023-07-17 PROCEDURE — 87186 SC STD MICRODIL/AGAR DIL: CPT | Performed by: CLINICAL MEDICAL LABORATORY

## 2023-07-17 PROCEDURE — 87086 URINE CULTURE/COLONY COUNT: CPT | Performed by: CLINICAL MEDICAL LABORATORY

## 2023-07-17 PROCEDURE — 80053 COMPREHEN METABOLIC PANEL: CPT | Performed by: CLINICAL MEDICAL LABORATORY

## 2023-07-17 PROCEDURE — 85025 COMPLETE CBC W/AUTO DIFF WBC: CPT | Performed by: CLINICAL MEDICAL LABORATORY

## 2023-07-17 PROCEDURE — 87077 CULTURE AEROBIC IDENTIFY: CPT | Performed by: CLINICAL MEDICAL LABORATORY

## 2023-07-17 PROCEDURE — 36415 COLL VENOUS BLD VENIPUNCTURE: CPT | Performed by: CLINICAL MEDICAL LABORATORY

## 2023-07-18 RX ORDER — LISINOPRIL 10 MG/1
TABLET ORAL
Qty: 90 TABLET | Refills: 0 | Status: ON HOLD | OUTPATIENT
Start: 2023-07-18 | End: 2023-07-27 | Stop reason: SDUPTHER

## 2023-07-18 RX ORDER — ATORVASTATIN CALCIUM 20 MG/1
TABLET, FILM COATED ORAL
Qty: 90 TABLET | Refills: 0 | Status: SHIPPED | OUTPATIENT
Start: 2023-07-18 | End: 2023-10-06

## 2023-07-19 ENCOUNTER — TELEPHONE (OUTPATIENT)
Dept: HEMATOLOGY/ONCOLOGY | Age: 68
End: 2023-07-19

## 2023-07-19 ENCOUNTER — TELEPHONE (OUTPATIENT)
Dept: FAMILY MEDICINE | Age: 68
End: 2023-07-19

## 2023-07-19 ENCOUNTER — RESEARCH - ONCOLOGY (OUTPATIENT)
Dept: HEMATOLOGY/ONCOLOGY | Age: 68
End: 2023-07-19

## 2023-07-19 DIAGNOSIS — C54.1 ENDOMETRIAL ADENOCARCINOMA (CMD): Primary | ICD-10-CM

## 2023-07-19 LAB — BACTERIA UR CULT: ABNORMAL

## 2023-07-20 ENCOUNTER — APPOINTMENT (OUTPATIENT)
Dept: FAMILY MEDICINE | Age: 68
End: 2023-07-20

## 2023-07-20 ENCOUNTER — RESEARCH - ONCOLOGY (OUTPATIENT)
Dept: HEMATOLOGY/ONCOLOGY | Age: 68
End: 2023-07-20

## 2023-07-20 ENCOUNTER — OFFICE VISIT (OUTPATIENT)
Dept: FAMILY MEDICINE | Age: 68
End: 2023-07-20

## 2023-07-20 VITALS
HEART RATE: 64 BPM | TEMPERATURE: 98.8 F | BODY MASS INDEX: 30.7 KG/M2 | SYSTOLIC BLOOD PRESSURE: 110 MMHG | DIASTOLIC BLOOD PRESSURE: 62 MMHG | HEIGHT: 64 IN | OXYGEN SATURATION: 98 % | WEIGHT: 179.8 LBS | RESPIRATION RATE: 16 BRPM

## 2023-07-20 DIAGNOSIS — I10 BENIGN ESSENTIAL HYPERTENSION: ICD-10-CM

## 2023-07-20 DIAGNOSIS — N95.0 POST-MENOPAUSAL BLEEDING: ICD-10-CM

## 2023-07-20 DIAGNOSIS — E78.2 MIXED HYPERLIPIDEMIA: ICD-10-CM

## 2023-07-20 DIAGNOSIS — G47.33 OSA ON CPAP: ICD-10-CM

## 2023-07-20 DIAGNOSIS — C54.1 ENDOMETRIAL ADENOCARCINOMA (CMD): ICD-10-CM

## 2023-07-20 DIAGNOSIS — Z01.818 PRE-OP EVALUATION: Primary | ICD-10-CM

## 2023-07-20 PROBLEM — M54.9 CHRONIC BACK PAIN: Status: ACTIVE | Noted: 2018-01-21

## 2023-07-20 PROBLEM — M16.12 OSTEOARTHRITIS OF ONE HIP, LEFT: Status: ACTIVE | Noted: 2022-09-01

## 2023-07-20 PROBLEM — G89.29 CHRONIC BACK PAIN: Status: ACTIVE | Noted: 2018-01-21

## 2023-07-20 PROCEDURE — 99214 OFFICE O/P EST MOD 30 MIN: CPT | Performed by: FAMILY MEDICINE

## 2023-07-20 ASSESSMENT — ENCOUNTER SYMPTOMS
HALLUCINATIONS: 0
CONFUSION: 0
APPETITE CHANGE: 0
BACK PAIN: 0
AGITATION: 0
NUMBNESS: 0
SORE THROAT: 0
FACIAL ASYMMETRY: 0
LIGHT-HEADEDNESS: 0
CONSTIPATION: 0
SINUS PRESSURE: 0
SEIZURES: 0
DIARRHEA: 0
SINUS PAIN: 0
NERVOUS/ANXIOUS: 0
SHORTNESS OF BREATH: 0
WOUND: 0
ACTIVITY CHANGE: 0
COUGH: 0
FATIGUE: 0
EYES NEGATIVE: 1
SPEECH DIFFICULTY: 0
HEADACHES: 0
CHILLS: 0
SLEEP DISTURBANCE: 0
DIZZINESS: 0
NAUSEA: 0
BLOOD IN STOOL: 0
ABDOMINAL PAIN: 0
ABDOMINAL DISTENTION: 0
TROUBLE SWALLOWING: 0
VOMITING: 0
WHEEZING: 0
FACIAL SWELLING: 0
CHEST TIGHTNESS: 0
ADENOPATHY: 0
WEAKNESS: 0
FEVER: 0
RHINORRHEA: 0

## 2023-07-20 ASSESSMENT — PATIENT HEALTH QUESTIONNAIRE - PHQ9
CLINICAL INTERPRETATION OF PHQ2 SCORE: NO FURTHER SCREENING NEEDED
2. FEELING DOWN, DEPRESSED OR HOPELESS: NOT AT ALL
SUM OF ALL RESPONSES TO PHQ9 QUESTIONS 1 AND 2: 0
1. LITTLE INTEREST OR PLEASURE IN DOING THINGS: NOT AT ALL
SUM OF ALL RESPONSES TO PHQ9 QUESTIONS 1 AND 2: 0

## 2023-07-25 ASSESSMENT — ACTIVITIES OF DAILY LIVING (ADL)
ADL_SCORE: 12
ADL_BEFORE_ADMISSION: INDEPENDENT

## 2023-07-26 ENCOUNTER — RESEARCH - ONCOLOGY (OUTPATIENT)
Dept: HEMATOLOGY/ONCOLOGY | Age: 68
End: 2023-07-26

## 2023-07-26 ENCOUNTER — HOSPITAL ENCOUNTER (OUTPATIENT)
Age: 68
Discharge: HOME | End: 2023-07-27
Attending: OBSTETRICS & GYNECOLOGY | Admitting: INTERNAL MEDICINE

## 2023-07-26 ENCOUNTER — ANESTHESIA EVENT (OUTPATIENT)
Dept: SURGERY | Age: 68
End: 2023-07-26

## 2023-07-26 ENCOUNTER — ANESTHESIA (OUTPATIENT)
Dept: SURGERY | Age: 68
End: 2023-07-26

## 2023-07-26 DIAGNOSIS — I10 BENIGN ESSENTIAL HYPERTENSION: ICD-10-CM

## 2023-07-26 DIAGNOSIS — C54.1 ENDOMETRIAL CANCER (CMD): Primary | ICD-10-CM

## 2023-07-26 LAB
ABO + RH BLD: NORMAL
BLD GP AB SCN SERPL QL GEL: NEGATIVE
TYPE AND SCREEN EXPIRATION DATE: NORMAL

## 2023-07-26 PROCEDURE — 10002016 HB COUNTER INCENTIVE SPIROMETRY

## 2023-07-26 PROCEDURE — 13000099 HB GENERAL ROBOTIC CASE EA ADD MINUTE: Performed by: OBSTETRICS & GYNECOLOGY

## 2023-07-26 PROCEDURE — 99223 1ST HOSP IP/OBS HIGH 75: CPT | Performed by: INTERNAL MEDICINE

## 2023-07-26 PROCEDURE — 10002801 HB RX 250 W/O HCPCS

## 2023-07-26 PROCEDURE — 36415 COLL VENOUS BLD VENIPUNCTURE: CPT | Performed by: OBSTETRICS & GYNECOLOGY

## 2023-07-26 PROCEDURE — 10002807 HB RX 258

## 2023-07-26 PROCEDURE — 10002800 HB RX 250 W HCPCS

## 2023-07-26 PROCEDURE — 10006023 HB SUPPLY 272: Performed by: OBSTETRICS & GYNECOLOGY

## 2023-07-26 PROCEDURE — 13001086 HB INCENTIVE SPIROMETER W INSTRUCT

## 2023-07-26 PROCEDURE — 94660 CPAP INITIATION&MGMT: CPT

## 2023-07-26 PROCEDURE — 10002800 HB RX 250 W HCPCS: Performed by: OBSTETRICS & GYNECOLOGY

## 2023-07-26 PROCEDURE — 10002803 HB RX 637

## 2023-07-26 PROCEDURE — 13000098 HB GENERAL ROBOTIC CASE S/U + 1ST 15 MIN: Performed by: OBSTETRICS & GYNECOLOGY

## 2023-07-26 PROCEDURE — 10004452 HB PACU ADDL 30 MINUTES: Performed by: OBSTETRICS & GYNECOLOGY

## 2023-07-26 PROCEDURE — 10004180 HB COUNTER-TRANSPORT

## 2023-07-26 PROCEDURE — 13000003 HB ANESTHESIA  GENERAL EA ADD MINUTE: Performed by: OBSTETRICS & GYNECOLOGY

## 2023-07-26 PROCEDURE — 88360 TUMOR IMMUNOHISTOCHEM/MANUAL: CPT | Performed by: OBSTETRICS & GYNECOLOGY

## 2023-07-26 PROCEDURE — 10002801 HB RX 250 W/O HCPCS: Performed by: OBSTETRICS & GYNECOLOGY

## 2023-07-26 PROCEDURE — 86901 BLOOD TYPING SEROLOGIC RH(D): CPT | Performed by: OBSTETRICS & GYNECOLOGY

## 2023-07-26 PROCEDURE — 10004651 HB RX, NO CHARGE ITEM: Performed by: OBSTETRICS & GYNECOLOGY

## 2023-07-26 PROCEDURE — 88112 CYTOPATH CELL ENHANCE TECH: CPT | Performed by: OBSTETRICS & GYNECOLOGY

## 2023-07-26 PROCEDURE — 10004451 HB PACU RECOVERY 1ST 30 MINUTES: Performed by: OBSTETRICS & GYNECOLOGY

## 2023-07-26 PROCEDURE — 10002803 HB RX 637: Performed by: OBSTETRICS & GYNECOLOGY

## 2023-07-26 PROCEDURE — 13000002 HB ANESTHESIA  GENERAL  S/U + 1ST 15 MIN: Performed by: OBSTETRICS & GYNECOLOGY

## 2023-07-26 RX ORDER — SODIUM CHLORIDE, SODIUM LACTATE, POTASSIUM CHLORIDE, CALCIUM CHLORIDE 600; 310; 30; 20 MG/100ML; MG/100ML; MG/100ML; MG/100ML
INJECTION, SOLUTION INTRAVENOUS CONTINUOUS
Status: DISCONTINUED | OUTPATIENT
Start: 2023-07-26 | End: 2023-07-26 | Stop reason: HOSPADM

## 2023-07-26 RX ORDER — BUPIVACAINE HYDROCHLORIDE 2.5 MG/ML
INJECTION, SOLUTION EPIDURAL; INFILTRATION; INTRACAUDAL PRN
Status: DISCONTINUED | OUTPATIENT
Start: 2023-07-26 | End: 2023-07-26 | Stop reason: HOSPADM

## 2023-07-26 RX ORDER — NAPROXEN SODIUM 220 MG
440 TABLET ORAL 2 TIMES DAILY WITH MEALS
COMMUNITY

## 2023-07-26 RX ORDER — ACETAMINOPHEN 325 MG/1
650 TABLET ORAL ONCE
Status: COMPLETED | OUTPATIENT
Start: 2023-07-26 | End: 2023-07-26

## 2023-07-26 RX ORDER — VIT C/B6/B5/MAGNESIUM/HERB 173 50-5-6-5MG
1 CAPSULE ORAL DAILY
Status: ON HOLD | COMMUNITY
End: 2023-07-26 | Stop reason: HOSPADM

## 2023-07-26 RX ORDER — GABAPENTIN 300 MG/1
300 CAPSULE ORAL ONCE
Status: COMPLETED | OUTPATIENT
Start: 2023-07-26 | End: 2023-07-26

## 2023-07-26 RX ORDER — PROCHLORPERAZINE MALEATE 5 MG/1
5 TABLET ORAL EVERY 4 HOURS PRN
Status: DISCONTINUED | OUTPATIENT
Start: 2023-07-26 | End: 2023-07-27 | Stop reason: HOSPADM

## 2023-07-26 RX ORDER — AMOXICILLIN 250 MG
2 CAPSULE ORAL 2 TIMES DAILY PRN
Status: DISCONTINUED | OUTPATIENT
Start: 2023-07-26 | End: 2023-07-27 | Stop reason: HOSPADM

## 2023-07-26 RX ORDER — PROCHLORPERAZINE EDISYLATE 5 MG/ML
5 INJECTION INTRAMUSCULAR; INTRAVENOUS EVERY 4 HOURS PRN
Status: DISCONTINUED | OUTPATIENT
Start: 2023-07-26 | End: 2023-07-27 | Stop reason: HOSPADM

## 2023-07-26 RX ORDER — DOCUSATE SODIUM 100 MG/1
100 CAPSULE, LIQUID FILLED ORAL 2 TIMES DAILY
Status: DISCONTINUED | OUTPATIENT
Start: 2023-07-26 | End: 2023-07-27 | Stop reason: HOSPADM

## 2023-07-26 RX ORDER — LIDOCAINE HYDROCHLORIDE 20 MG/ML
INJECTION, SOLUTION INFILTRATION; PERINEURAL PRN
Status: DISCONTINUED | OUTPATIENT
Start: 2023-07-26 | End: 2023-07-26

## 2023-07-26 RX ORDER — MIDAZOLAM HYDROCHLORIDE 1 MG/ML
INJECTION, SOLUTION INTRAMUSCULAR; INTRAVENOUS PRN
Status: DISCONTINUED | OUTPATIENT
Start: 2023-07-26 | End: 2023-07-26

## 2023-07-26 RX ORDER — ONDANSETRON 2 MG/ML
INJECTION INTRAMUSCULAR; INTRAVENOUS PRN
Status: DISCONTINUED | OUTPATIENT
Start: 2023-07-26 | End: 2023-07-26

## 2023-07-26 RX ORDER — HUMAN INSULIN 100 [IU]/ML
INJECTION, SOLUTION SUBCUTANEOUS
Status: DISCONTINUED | OUTPATIENT
Start: 2023-07-26 | End: 2023-07-26 | Stop reason: HOSPADM

## 2023-07-26 RX ORDER — LORAZEPAM 2 MG/ML
0.5 INJECTION INTRAMUSCULAR 2 TIMES DAILY PRN
Status: DISCONTINUED | OUTPATIENT
Start: 2023-07-26 | End: 2023-07-27 | Stop reason: HOSPADM

## 2023-07-26 RX ORDER — NAPROXEN 250 MG/1
250 TABLET ORAL 2 TIMES DAILY PRN
Status: DISCONTINUED | OUTPATIENT
Start: 2023-07-26 | End: 2023-07-27 | Stop reason: HOSPADM

## 2023-07-26 RX ORDER — IBUPROFEN 400 MG/1
600 TABLET ORAL EVERY 6 HOURS PRN
Status: DISCONTINUED | OUTPATIENT
Start: 2023-07-26 | End: 2023-07-26

## 2023-07-26 RX ORDER — LIDOCAINE HYDROCHLORIDE 10 MG/ML
5-10 INJECTION, SOLUTION EPIDURAL; INFILTRATION; INTRACAUDAL; PERINEURAL PRN
Status: DISCONTINUED | OUTPATIENT
Start: 2023-07-26 | End: 2023-07-26 | Stop reason: HOSPADM

## 2023-07-26 RX ORDER — GLYCOPYRROLATE 0.2 MG/ML
INJECTION, SOLUTION INTRAMUSCULAR; INTRAVENOUS PRN
Status: DISCONTINUED | OUTPATIENT
Start: 2023-07-26 | End: 2023-07-26

## 2023-07-26 RX ORDER — ENOXAPARIN SODIUM 100 MG/ML
40 INJECTION SUBCUTANEOUS DAILY
Status: DISCONTINUED | OUTPATIENT
Start: 2023-07-27 | End: 2023-07-27 | Stop reason: HOSPADM

## 2023-07-26 RX ORDER — HYDRALAZINE HYDROCHLORIDE 20 MG/ML
5 INJECTION INTRAMUSCULAR; INTRAVENOUS EVERY 10 MIN PRN
Status: DISCONTINUED | OUTPATIENT
Start: 2023-07-26 | End: 2023-07-26 | Stop reason: HOSPADM

## 2023-07-26 RX ORDER — HYDROCODONE BITARTRATE AND ACETAMINOPHEN 10; 325 MG/1; MG/1
0.5 TABLET ORAL
COMMUNITY
End: 2023-10-05

## 2023-07-26 RX ORDER — SIMETHICONE 125 MG
125 TABLET,CHEWABLE ORAL 4 TIMES DAILY PRN
Status: SHIPPED | COMMUNITY
Start: 2023-07-26 | End: 2023-10-05

## 2023-07-26 RX ORDER — ONDANSETRON 2 MG/ML
4 INJECTION INTRAMUSCULAR; INTRAVENOUS EVERY 6 HOURS PRN
Status: DISCONTINUED | OUTPATIENT
Start: 2023-07-26 | End: 2023-07-27 | Stop reason: HOSPADM

## 2023-07-26 RX ORDER — HYDROCODONE BITARTRATE AND ACETAMINOPHEN 5; 325 MG/1; MG/1
1 TABLET ORAL EVERY 6 HOURS PRN
Status: DISCONTINUED | OUTPATIENT
Start: 2023-07-26 | End: 2023-07-27 | Stop reason: HOSPADM

## 2023-07-26 RX ORDER — SODIUM CHLORIDE, SODIUM LACTATE, POTASSIUM CHLORIDE, CALCIUM CHLORIDE 600; 310; 30; 20 MG/100ML; MG/100ML; MG/100ML; MG/100ML
INJECTION, SOLUTION INTRAVENOUS CONTINUOUS
Status: DISCONTINUED | OUTPATIENT
Start: 2023-07-26 | End: 2023-07-26

## 2023-07-26 RX ORDER — NICOTINE POLACRILEX 4 MG
30 LOZENGE BUCCAL
Status: DISCONTINUED | OUTPATIENT
Start: 2023-07-26 | End: 2023-07-26 | Stop reason: HOSPADM

## 2023-07-26 RX ORDER — DOCUSATE SODIUM 100 MG/1
100 CAPSULE, LIQUID FILLED ORAL 2 TIMES DAILY
Qty: 30 CAPSULE | Refills: 0 | Status: SHIPPED | COMMUNITY
Start: 2023-07-26

## 2023-07-26 RX ORDER — ROCURONIUM BROMIDE 10 MG/ML
INJECTION, SOLUTION INTRAVENOUS PRN
Status: DISCONTINUED | OUTPATIENT
Start: 2023-07-26 | End: 2023-07-26

## 2023-07-26 RX ORDER — METOCLOPRAMIDE HYDROCHLORIDE 5 MG/ML
5 INJECTION INTRAMUSCULAR; INTRAVENOUS
Status: ACTIVE | OUTPATIENT
Start: 2023-07-26 | End: 2023-07-26

## 2023-07-26 RX ORDER — PROPOFOL 10 MG/ML
INJECTION, EMULSION INTRAVENOUS PRN
Status: DISCONTINUED | OUTPATIENT
Start: 2023-07-26 | End: 2023-07-26

## 2023-07-26 RX ORDER — FAMOTIDINE 20 MG/1
20 TABLET, FILM COATED ORAL
Status: COMPLETED | OUTPATIENT
Start: 2023-07-26 | End: 2023-07-26

## 2023-07-26 RX ORDER — SIMETHICONE 125 MG
125 TABLET,CHEWABLE ORAL 4 TIMES DAILY PRN
Status: DISCONTINUED | OUTPATIENT
Start: 2023-07-26 | End: 2023-07-27 | Stop reason: HOSPADM

## 2023-07-26 RX ORDER — HYDROCODONE BITARTRATE AND ACETAMINOPHEN 5; 325 MG/1; MG/1
1 TABLET ORAL EVERY 6 HOURS PRN
Qty: 20 TABLET | Refills: 0 | Status: SHIPPED | COMMUNITY
Start: 2023-07-26 | End: 2023-07-26

## 2023-07-26 RX ORDER — 0.9 % SODIUM CHLORIDE 0.9 %
2 VIAL (ML) INJECTION EVERY 12 HOURS SCHEDULED
Status: DISCONTINUED | OUTPATIENT
Start: 2023-07-26 | End: 2023-07-26 | Stop reason: HOSPADM

## 2023-07-26 RX ORDER — POLYETHYLENE GLYCOL 3350 17 G/17G
17 POWDER, FOR SOLUTION ORAL DAILY PRN
Qty: 30 EACH | Refills: 0 | Status: SHIPPED | COMMUNITY
Start: 2023-07-26 | End: 2023-10-05

## 2023-07-26 RX ORDER — ASPIRIN 81 MG/1
81 TABLET ORAL DAILY
Status: ON HOLD | COMMUNITY
End: 2023-07-27 | Stop reason: HOSPADM

## 2023-07-26 RX ORDER — ASPIRIN 81 MG/1
81 TABLET, CHEWABLE ORAL DAILY
Refills: 1 | Status: SHIPPED | COMMUNITY
Start: 2023-07-26 | End: 2023-08-02

## 2023-07-26 RX ORDER — ACETAMINOPHEN 325 MG/1
650 TABLET ORAL EVERY 6 HOURS SCHEDULED
Status: DISCONTINUED | OUTPATIENT
Start: 2023-07-26 | End: 2023-07-27 | Stop reason: HOSPADM

## 2023-07-26 RX ORDER — GABAPENTIN 100 MG/1
200 CAPSULE ORAL EVERY 8 HOURS SCHEDULED
Status: DISCONTINUED | OUTPATIENT
Start: 2023-07-26 | End: 2023-07-27 | Stop reason: HOSPADM

## 2023-07-26 RX ORDER — PHENOL 1.4 %
10 AEROSOL, SPRAY (ML) MUCOUS MEMBRANE AT BEDTIME
COMMUNITY

## 2023-07-26 RX ORDER — DEXTROSE MONOHYDRATE 25 G/50ML
25 INJECTION, SOLUTION INTRAVENOUS PRN
Status: DISCONTINUED | OUTPATIENT
Start: 2023-07-26 | End: 2023-07-26 | Stop reason: HOSPADM

## 2023-07-26 RX ORDER — SCOLOPAMINE TRANSDERMAL SYSTEM 1 MG/1
1 PATCH, EXTENDED RELEASE TRANSDERMAL PRN
Status: COMPLETED | OUTPATIENT
Start: 2023-07-26 | End: 2023-07-27

## 2023-07-26 RX ORDER — DEXAMETHASONE SODIUM PHOSPHATE 4 MG/ML
INJECTION, SOLUTION INTRA-ARTICULAR; INTRALESIONAL; INTRAMUSCULAR; INTRAVENOUS; SOFT TISSUE PRN
Status: DISCONTINUED | OUTPATIENT
Start: 2023-07-26 | End: 2023-07-26

## 2023-07-26 RX ORDER — IBUPROFEN 600 MG/1
600 TABLET ORAL EVERY 6 HOURS PRN
Qty: 30 TABLET | Refills: 0 | Status: SHIPPED | COMMUNITY
Start: 2023-07-26 | End: 2023-07-26

## 2023-07-26 RX ORDER — ONDANSETRON 2 MG/ML
4 INJECTION INTRAMUSCULAR; INTRAVENOUS
Status: ACTIVE | OUTPATIENT
Start: 2023-07-26 | End: 2023-07-26

## 2023-07-26 RX ORDER — NEOSTIGMINE METHYLSULFATE 4 MG/4 ML
SYRINGE (ML) INTRAVENOUS PRN
Status: DISCONTINUED | OUTPATIENT
Start: 2023-07-26 | End: 2023-07-26

## 2023-07-26 RX ORDER — ONDANSETRON 4 MG/1
4 TABLET, ORALLY DISINTEGRATING ORAL EVERY 12 HOURS PRN
Status: DISCONTINUED | OUTPATIENT
Start: 2023-07-26 | End: 2023-07-27 | Stop reason: HOSPADM

## 2023-07-26 RX ORDER — FAMOTIDINE 20 MG/1
20 TABLET, FILM COATED ORAL DAILY
Status: DISCONTINUED | OUTPATIENT
Start: 2023-07-27 | End: 2023-07-27 | Stop reason: HOSPADM

## 2023-07-26 RX ORDER — POLYETHYLENE GLYCOL 3350 17 G/17G
17 POWDER, FOR SOLUTION ORAL DAILY
Status: DISCONTINUED | OUTPATIENT
Start: 2023-07-27 | End: 2023-07-27 | Stop reason: HOSPADM

## 2023-07-26 RX ORDER — INDOCYANINE GREEN AND WATER 25 MG
KIT INJECTION PRN
Status: DISCONTINUED | OUTPATIENT
Start: 2023-07-26 | End: 2023-07-26 | Stop reason: HOSPADM

## 2023-07-26 RX ADMIN — GABAPENTIN 300 MG: 300 CAPSULE ORAL at 06:45

## 2023-07-26 RX ADMIN — Medication 4 MG: at 09:23

## 2023-07-26 RX ADMIN — ACETAMINOPHEN 650 MG: 325 TABLET ORAL at 12:00

## 2023-07-26 RX ADMIN — ACETAMINOPHEN 650 MG: 325 TABLET ORAL at 23:51

## 2023-07-26 RX ADMIN — SODIUM CHLORIDE, POTASSIUM CHLORIDE, SODIUM LACTATE AND CALCIUM CHLORIDE: 600; 310; 30; 20 INJECTION, SOLUTION INTRAVENOUS at 09:05

## 2023-07-26 RX ADMIN — DOCUSATE SODIUM 100 MG: 100 CAPSULE ORAL at 20:50

## 2023-07-26 RX ADMIN — ONDANSETRON 4 MG: 2 INJECTION INTRAMUSCULAR; INTRAVENOUS at 09:00

## 2023-07-26 RX ADMIN — FENTANYL CITRATE 50 MCG: 0.05 INJECTION, SOLUTION INTRAMUSCULAR; INTRAVENOUS at 09:49

## 2023-07-26 RX ADMIN — CEFAZOLIN SODIUM 2000 MG: 300 INJECTION, POWDER, LYOPHILIZED, FOR SOLUTION INTRAVENOUS at 07:50

## 2023-07-26 RX ADMIN — DOCUSATE SODIUM 100 MG: 100 CAPSULE ORAL at 12:00

## 2023-07-26 RX ADMIN — HYDROMORPHONE HYDROCHLORIDE 0.2 MG: 1 INJECTION, SOLUTION INTRAMUSCULAR; INTRAVENOUS; SUBCUTANEOUS at 10:27

## 2023-07-26 RX ADMIN — GLYCOPYRROLATE 0.6 MG: 0.2 INJECTION, SOLUTION INTRAMUSCULAR; INTRAVENOUS at 09:23

## 2023-07-26 RX ADMIN — FENTANYL CITRATE 50 MCG: 0.05 INJECTION, SOLUTION INTRAMUSCULAR; INTRAVENOUS at 10:01

## 2023-07-26 RX ADMIN — ACETAMINOPHEN 650 MG: 325 TABLET ORAL at 18:29

## 2023-07-26 RX ADMIN — SCOPALAMINE 1 PATCH: 1 PATCH, EXTENDED RELEASE TRANSDERMAL at 06:47

## 2023-07-26 RX ADMIN — MIDAZOLAM HYDROCHLORIDE 2 MG: 1 INJECTION, SOLUTION INTRAMUSCULAR; INTRAVENOUS at 07:36

## 2023-07-26 RX ADMIN — ROCURONIUM BROMIDE 40 MG: 10 INJECTION, SOLUTION INTRAVENOUS at 07:40

## 2023-07-26 RX ADMIN — FENTANYL CITRATE 50 MCG: 50 INJECTION, SOLUTION INTRAMUSCULAR; INTRAVENOUS at 09:20

## 2023-07-26 RX ADMIN — GABAPENTIN 200 MG: 100 CAPSULE ORAL at 20:50

## 2023-07-26 RX ADMIN — FENTANYL CITRATE 50 MCG: 50 INJECTION, SOLUTION INTRAMUSCULAR; INTRAVENOUS at 07:36

## 2023-07-26 RX ADMIN — HYDROMORPHONE HYDROCHLORIDE 0.2 MG: 1 INJECTION, SOLUTION INTRAMUSCULAR; INTRAVENOUS; SUBCUTANEOUS at 10:14

## 2023-07-26 RX ADMIN — PROPOFOL 170 MG: 10 INJECTION, EMULSION INTRAVENOUS at 07:40

## 2023-07-26 RX ADMIN — LIDOCAINE HYDROCHLORIDE 5 ML: 20 INJECTION, SOLUTION INFILTRATION; PERINEURAL at 07:40

## 2023-07-26 RX ADMIN — DEXAMETHASONE SODIUM PHOSPHATE 10 MG: 4 INJECTION, SOLUTION INTRAMUSCULAR; INTRAVENOUS at 08:00

## 2023-07-26 RX ADMIN — FAMOTIDINE 20 MG: 20 TABLET, FILM COATED ORAL at 06:45

## 2023-07-26 RX ADMIN — ACETAMINOPHEN 650 MG: 325 TABLET ORAL at 06:45

## 2023-07-26 RX ADMIN — METRONIDAZOLE 500 MG: 500 INJECTION, SOLUTION INTRAVENOUS at 08:06

## 2023-07-26 RX ADMIN — KETOROLAC TROMETHAMINE 30 MG: 30 INJECTION, SOLUTION INTRAMUSCULAR at 09:24

## 2023-07-26 RX ADMIN — SODIUM CHLORIDE, POTASSIUM CHLORIDE, SODIUM LACTATE AND CALCIUM CHLORIDE: 600; 310; 30; 20 INJECTION, SOLUTION INTRAVENOUS at 06:49

## 2023-07-26 RX ADMIN — GABAPENTIN 200 MG: 100 CAPSULE ORAL at 14:06

## 2023-07-26 SDOH — HEALTH STABILITY: GENERAL: BECAUSE OF A PHYSICAL, MENTAL, OR EMOTIONAL CONDITION, DO YOU HAVE DIFFICULTY DOING ERRANDS ALONE?: NO

## 2023-07-26 SDOH — ECONOMIC STABILITY: TRANSPORTATION INSECURITY
IN THE PAST 12 MONTHS, HAS THE LACK OF TRANSPORTATION KEPT YOU FROM MEDICAL APPOINTMENTS OR FROM GETTING MEDICATIONS?: NO

## 2023-07-26 SDOH — SOCIAL STABILITY: SOCIAL INSECURITY: RISK FACTORS: SLEEP APNEA

## 2023-07-26 SDOH — HEALTH STABILITY: PHYSICAL HEALTH: DO YOU HAVE DIFFICULTY DRESSING OR BATHING?: NO

## 2023-07-26 SDOH — SOCIAL STABILITY: SOCIAL NETWORK: SUPPORT SYSTEMS: SPOUSE

## 2023-07-26 SDOH — ECONOMIC STABILITY: GENERAL

## 2023-07-26 SDOH — SOCIAL STABILITY: SOCIAL INSECURITY: RISK FACTORS: BMI> 30 (OBESITY)

## 2023-07-26 SDOH — HEALTH STABILITY: GENERAL
BECAUSE OF A PHYSICAL, MENTAL, OR EMOTIONAL CONDITION, DO YOU HAVE SERIOUS DIFFICULTY CONCENTRATING, REMEMBERING OR MAKING DECISIONS?: NO

## 2023-07-26 SDOH — HEALTH STABILITY: PHYSICAL HEALTH: DO YOU HAVE SERIOUS DIFFICULTY WALKING OR CLIMBING STAIRS?: NO

## 2023-07-26 SDOH — ECONOMIC STABILITY: HOUSING INSECURITY: ARE YOU WORRIED ABOUT LOSING YOUR HOUSING?: NO

## 2023-07-26 SDOH — ECONOMIC STABILITY: FOOD INSECURITY: HOW OFTEN IN THE PAST 12 MONTHS WERE YOU WORRIED OR STRESSED ABOUT HAVING ENOUGH MONEY TO BUY NUTRITIOUS MEALS?: NEVER

## 2023-07-26 SDOH — ECONOMIC STABILITY: HOUSING INSECURITY: WHAT IS YOUR LIVING SITUATION TODAY?: SPOUSE

## 2023-07-26 SDOH — ECONOMIC STABILITY: TRANSPORTATION INSECURITY
IN THE PAST 12 MONTHS, HAS LACK OF TRANSPORTATION KEPT YOU FROM MEETINGS, WORK, OR FROM GETTING THINGS NEEDED FOR DAILY LIVING?: NO

## 2023-07-26 SDOH — SOCIAL STABILITY: SOCIAL INSECURITY: RISK FACTORS: HEART DISEASE

## 2023-07-26 SDOH — ECONOMIC STABILITY: HOUSING INSECURITY: WHAT IS YOUR LIVING SITUATION TODAY?: HOUSE

## 2023-07-26 SDOH — SOCIAL STABILITY: SOCIAL NETWORK
HOW OFTEN DO YOU SEE OR TALK TO PEOPLE THAT YOU CARE ABOUT AND FEEL CLOSE TO? (FOR EXAMPLE: TALKING TO FRIENDS ON THE PHONE, VISITING FRIENDS OR FAMILY, GOING TO CHURCH OR CLUB MEETINGS): 3 TO 5 TIMES A WEEK

## 2023-07-26 ASSESSMENT — LIFESTYLE VARIABLES
HOW OFTEN DO YOU HAVE A DRINK CONTAINING ALCOHOL: NEVER
HOW MANY STANDARD DRINKS CONTAINING ALCOHOL DO YOU HAVE ON A TYPICAL DAY: 0,1 OR 2
AUDIT-C TOTAL SCORE: 0
ALCOHOL_USE_STATUS: NO OR LOW RISK WITH VALIDATED TOOL
HOW OFTEN DO YOU HAVE 6 OR MORE DRINKS ON ONE OCCASION: NEVER

## 2023-07-26 ASSESSMENT — PATIENT HEALTH QUESTIONNAIRE - PHQ9
SUM OF ALL RESPONSES TO PHQ9 QUESTIONS 1 AND 2: 0
2. FEELING DOWN, DEPRESSED OR HOPELESS: NOT AT ALL
SUM OF ALL RESPONSES TO PHQ9 QUESTIONS 1 AND 2: 0
1. LITTLE INTEREST OR PLEASURE IN DOING THINGS: NOT AT ALL
CLINICAL INTERPRETATION OF PHQ2 SCORE: NO FURTHER SCREENING NEEDED
IS PATIENT ABLE TO COMPLETE PHQ2 OR PHQ9: YES

## 2023-07-26 ASSESSMENT — ACTIVITIES OF DAILY LIVING (ADL)
ADL_SHORT_OF_BREATH: NO
RECENT_DECLINE_ADL: NO
ADL_BEFORE_ADMISSION: INDEPENDENT
ADL_SCORE: 12

## 2023-07-26 ASSESSMENT — COLUMBIA-SUICIDE SEVERITY RATING SCALE - C-SSRS
2. HAVE YOU ACTUALLY HAD ANY THOUGHTS OF KILLING YOURSELF?: NO
IS THE PATIENT ABLE TO COMPLETE C-SSRS: YES
6. HAVE YOU EVER DONE ANYTHING, STARTED TO DO ANYTHING, OR PREPARED TO DO ANYTHING TO END YOUR LIFE?: NO
1. WITHIN THE PAST MONTH, HAVE YOU WISHED YOU WERE DEAD OR WISHED YOU COULD GO TO SLEEP AND NOT WAKE UP?: NO

## 2023-07-26 ASSESSMENT — PAIN SCALES - GENERAL
PAINLEVEL_OUTOF10: 6
PAINLEVEL_OUTOF10: 0
PAINLEVEL_OUTOF10: 7
PAINLEVEL_OUTOF10: 4
PAINLEVEL_OUTOF10: 4
PAINLEVEL_OUTOF10: 2
PAINLEVEL_OUTOF10: 6
PAINLEVEL_OUTOF10: 5
PAINLEVEL_OUTOF10: 4

## 2023-07-26 ASSESSMENT — ORIENTATION MEMORY CONCENTRATION TEST (OMCT)
COUNT BACKWARDS FROM 20 TO 1: CORRECT
REPEAT THE NAME AND ADDRESS I ASKED YOU TO REMEMBER: CORRECT
OMCT SCORE: 0
WHAT YEAR IS IT NOW (MUST BE EXACT): CORRECT
WHAT MONTH IS IT NOW: CORRECT
WHAT TIME IS IT (NO WATCH OR CLOCK): CORRECT
SAY THE MONTHS IN REVERSE ORDER STARTING WITH LAST MONTH: CORRECT
OMCT INTERPRETATION: 0-6: NO SIGNIFICANT IMPAIRMENT

## 2023-07-26 ASSESSMENT — PAIN SCALES - WONG BAKER: WONGBAKER_NUMERICALRESPONSE: 0

## 2023-07-26 ASSESSMENT — PAIN DESCRIPTION - PAIN TYPE: TYPE: ACUTE PAIN

## 2023-07-27 VITALS
HEIGHT: 63 IN | RESPIRATION RATE: 12 BRPM | WEIGHT: 183.86 LBS | TEMPERATURE: 97.7 F | HEART RATE: 72 BPM | BODY MASS INDEX: 32.58 KG/M2 | SYSTOLIC BLOOD PRESSURE: 108 MMHG | DIASTOLIC BLOOD PRESSURE: 65 MMHG | OXYGEN SATURATION: 100 %

## 2023-07-27 LAB
ANION GAP SERPL CALC-SCNC: 10 MMOL/L (ref 7–19)
ASR DISCLAIMER: NORMAL
ATRIAL RATE (BPM): 67
BASOPHILS # BLD: 0 K/MCL (ref 0–0.3)
BASOPHILS NFR BLD: 0 %
BUN SERPL-MCNC: 23 MG/DL (ref 6–20)
BUN/CREAT SERPL: 35 (ref 7–25)
CALCIUM SERPL-MCNC: 9.1 MG/DL (ref 8.4–10.2)
CASE RPRT: NORMAL
CHLORIDE SERPL-SCNC: 109 MMOL/L (ref 97–110)
CLINICAL INFO: NORMAL
CO2 SERPL-SCNC: 25 MMOL/L (ref 21–32)
CREAT SERPL-MCNC: 0.66 MG/DL (ref 0.51–0.95)
DEPRECATED RDW RBC: 43.5 FL (ref 39–50)
EOSINOPHIL # BLD: 0 K/MCL (ref 0–0.5)
EOSINOPHIL NFR BLD: 0 %
ERYTHROCYTE [DISTWIDTH] IN BLOOD: 13.2 % (ref 11–15)
FASTING DURATION TIME PATIENT: ABNORMAL H
GFR SERPLBLD BASED ON 1.73 SQ M-ARVRAT: >90 ML/MIN
GLUCOSE SERPL-MCNC: 120 MG/DL (ref 70–99)
HCT VFR BLD CALC: 39.5 % (ref 36–46.5)
HGB BLD-MCNC: 12.8 G/DL (ref 12–15.5)
IMM GRANULOCYTES # BLD AUTO: 0 K/MCL (ref 0–0.2)
IMM GRANULOCYTES # BLD: 0 %
LYMPHOCYTES # BLD: 1.1 K/MCL (ref 1–4)
LYMPHOCYTES NFR BLD: 9 %
MCH RBC QN AUTO: 29.2 PG (ref 26–34)
MCHC RBC AUTO-ENTMCNC: 32.4 G/DL (ref 32–36.5)
MCV RBC AUTO: 90 FL (ref 78–100)
MONOCYTES # BLD: 1.5 K/MCL (ref 0.3–0.9)
MONOCYTES NFR BLD: 12 %
NEUTROPHILS # BLD: 9.7 K/MCL (ref 1.8–7.7)
NEUTROPHILS NFR BLD: 79 %
NRBC BLD MANUAL-RTO: 0 /100 WBC
P AXIS (DEGREES): 55
PATH REPORT.FINAL DX SPEC: NORMAL
PATH REPORT.GROSS SPEC: NORMAL
PLATELET # BLD AUTO: 264 K/MCL (ref 140–450)
POTASSIUM SERPL-SCNC: 4.6 MMOL/L (ref 3.4–5.1)
PR-INTERVAL (MSEC): 206
QRS-INTERVAL (MSEC): 92
QT-INTERVAL (MSEC): 406
QTC: 429
R AXIS (DEGREES): -33
RBC # BLD: 4.39 MIL/MCL (ref 4–5.2)
REPORT TEXT: NORMAL
SODIUM SERPL-SCNC: 139 MMOL/L (ref 135–145)
T AXIS (DEGREES): 0
VENTRICULAR RATE EKG/MIN (BPM): 67
WBC # BLD: 12.5 K/MCL (ref 4.2–11)

## 2023-07-27 PROCEDURE — 94660 CPAP INITIATION&MGMT: CPT

## 2023-07-27 PROCEDURE — 10004180 HB COUNTER-TRANSPORT

## 2023-07-27 PROCEDURE — 36415 COLL VENOUS BLD VENIPUNCTURE: CPT | Performed by: OBSTETRICS & GYNECOLOGY

## 2023-07-27 PROCEDURE — 10004651 HB RX, NO CHARGE ITEM: Performed by: OBSTETRICS & GYNECOLOGY

## 2023-07-27 PROCEDURE — 10002800 HB RX 250 W HCPCS: Performed by: OBSTETRICS & GYNECOLOGY

## 2023-07-27 PROCEDURE — 99238 HOSP IP/OBS DSCHRG MGMT 30/<: CPT | Performed by: INTERNAL MEDICINE

## 2023-07-27 PROCEDURE — 80048 BASIC METABOLIC PNL TOTAL CA: CPT | Performed by: OBSTETRICS & GYNECOLOGY

## 2023-07-27 PROCEDURE — 96372 THER/PROPH/DIAG INJ SC/IM: CPT | Performed by: OBSTETRICS & GYNECOLOGY

## 2023-07-27 PROCEDURE — 85025 COMPLETE CBC W/AUTO DIFF WBC: CPT | Performed by: OBSTETRICS & GYNECOLOGY

## 2023-07-27 PROCEDURE — 10002803 HB RX 637: Performed by: OBSTETRICS & GYNECOLOGY

## 2023-07-27 PROCEDURE — 93005 ELECTROCARDIOGRAM TRACING: CPT | Performed by: INTERNAL MEDICINE

## 2023-07-27 RX ORDER — LISINOPRIL 10 MG/1
10 TABLET ORAL DAILY
Qty: 90 TABLET | Refills: 0 | Status: SHIPPED | COMMUNITY
Start: 2023-07-27 | End: 2023-10-06

## 2023-07-27 RX ADMIN — ENOXAPARIN SODIUM 40 MG: 40 INJECTION SUBCUTANEOUS at 09:00

## 2023-07-27 RX ADMIN — POLYETHYLENE GLYCOL 3350 17 G: 17 POWDER, FOR SOLUTION ORAL at 09:00

## 2023-07-27 RX ADMIN — DOCUSATE SODIUM 100 MG: 100 CAPSULE ORAL at 09:00

## 2023-07-27 RX ADMIN — FAMOTIDINE 20 MG: 20 TABLET, FILM COATED ORAL at 09:00

## 2023-07-27 RX ADMIN — GABAPENTIN 200 MG: 100 CAPSULE ORAL at 05:51

## 2023-07-27 RX ADMIN — ACETAMINOPHEN 650 MG: 325 TABLET ORAL at 05:51

## 2023-07-27 ASSESSMENT — COGNITIVE AND FUNCTIONAL STATUS - GENERAL
DO YOU HAVE DIFFICULTY DRESSING OR BATHING: NO
BECAUSE OF A PHYSICAL, MENTAL, OR EMOTIONAL CONDITION, DO YOU HAVE SERIOUS DIFFICULTY CONCENTRATING, REMEMBERING OR MAKING DECISIONS: NO
DO YOU HAVE SERIOUS DIFFICULTY WALKING OR CLIMBING STAIRS: NO
BECAUSE OF A PHYSICAL, MENTAL, OR EMOTIONAL CONDITION, DO YOU HAVE DIFFICULTY DOING ERRANDS ALONE: NO

## 2023-07-27 ASSESSMENT — PAIN SCALES - GENERAL: PAINLEVEL_OUTOF10: 2

## 2023-08-06 LAB
ASR DISCLAIMER: NORMAL
CASE RPRT: NORMAL
CLINICAL INFO: NORMAL
IHC PROGNOSTIC MARKER RESULT, ADDENDUM: NORMAL
PATH REPORT ADDENDUM.SYNOPTIC DOC: NORMAL
PATH REPORT.ADDENDUM SPEC: NORMAL
PATH REPORT.FINAL DX SPEC: NORMAL
PATH REPORT.GROSS SPEC: NORMAL

## 2023-08-10 ENCOUNTER — TELEPHONE (OUTPATIENT)
Dept: FAMILY MEDICINE | Age: 68
End: 2023-08-10

## 2023-08-10 DIAGNOSIS — Z12.31 VISIT FOR SCREENING MAMMOGRAM: Primary | ICD-10-CM

## 2023-08-22 ENCOUNTER — TELEPHONE (OUTPATIENT)
Dept: FAMILY MEDICINE | Age: 68
End: 2023-08-22

## 2023-10-02 ENCOUNTER — LAB SERVICES (OUTPATIENT)
Dept: LAB | Age: 68
End: 2023-10-02

## 2023-10-02 DIAGNOSIS — E78.2 MIXED HYPERLIPIDEMIA: ICD-10-CM

## 2023-10-02 DIAGNOSIS — I10 BENIGN ESSENTIAL HYPERTENSION: ICD-10-CM

## 2023-10-02 DIAGNOSIS — E55.9 HYPOVITAMINOSIS D: ICD-10-CM

## 2023-10-02 DIAGNOSIS — I73.00 RAYNAUD'S SYNDROME WITHOUT GANGRENE: ICD-10-CM

## 2023-10-02 DIAGNOSIS — I25.10 CORONARY ARTERY CALCIFICATION SEEN ON CT SCAN: ICD-10-CM

## 2023-10-02 DIAGNOSIS — G47.33 OSA ON CPAP: ICD-10-CM

## 2023-10-02 LAB
25(OH)D3+25(OH)D2 SERPL-MCNC: 69 NG/ML (ref 30–100)
ALBUMIN SERPL-MCNC: 3.9 G/DL (ref 3.6–5.1)
ALBUMIN/GLOB SERPL: 1.4 {RATIO} (ref 1–2.4)
ALP SERPL-CCNC: 75 UNITS/L (ref 45–117)
ALT SERPL-CCNC: 24 UNITS/L
ANION GAP SERPL CALC-SCNC: 7 MMOL/L (ref 7–19)
AST SERPL-CCNC: 11 UNITS/L
BASOPHILS # BLD: 0.1 K/MCL (ref 0–0.3)
BASOPHILS NFR BLD: 1 %
BILIRUB SERPL-MCNC: 0.4 MG/DL (ref 0.2–1)
BUN SERPL-MCNC: 31 MG/DL (ref 6–20)
BUN/CREAT SERPL: 46 (ref 7–25)
CALCIUM SERPL-MCNC: 9.2 MG/DL (ref 8.4–10.2)
CHLORIDE SERPL-SCNC: 110 MMOL/L (ref 97–110)
CHOLEST SERPL-MCNC: 142 MG/DL
CHOLEST/HDLC SERPL: 2.7 {RATIO}
CO2 SERPL-SCNC: 28 MMOL/L (ref 21–32)
CREAT SERPL-MCNC: 0.68 MG/DL (ref 0.51–0.95)
DEPRECATED RDW RBC: 43 FL (ref 39–50)
EGFRCR SERPLBLD CKD-EPI 2021: >90 ML/MIN/{1.73_M2}
EOSINOPHIL # BLD: 0.6 K/MCL (ref 0–0.5)
EOSINOPHIL NFR BLD: 7 %
ERYTHROCYTE [DISTWIDTH] IN BLOOD: 13.2 % (ref 11–15)
FASTING DURATION TIME PATIENT: ABNORMAL H
GLOBULIN SER-MCNC: 2.8 G/DL (ref 2–4)
GLUCOSE SERPL-MCNC: 102 MG/DL (ref 70–99)
HCT VFR BLD CALC: 41.5 % (ref 36–46.5)
HDLC SERPL-MCNC: 52 MG/DL
HGB BLD-MCNC: 13.4 G/DL (ref 12–15.5)
IMM GRANULOCYTES # BLD AUTO: 0 K/MCL (ref 0–0.2)
IMM GRANULOCYTES # BLD: 0 %
LDLC SERPL CALC-MCNC: 77 MG/DL
LYMPHOCYTES # BLD: 1.2 K/MCL (ref 1–4)
LYMPHOCYTES NFR BLD: 14 %
MCH RBC QN AUTO: 28.7 PG (ref 26–34)
MCHC RBC AUTO-ENTMCNC: 32.3 G/DL (ref 32–36.5)
MCV RBC AUTO: 88.9 FL (ref 78–100)
MONOCYTES # BLD: 0.8 K/MCL (ref 0.3–0.9)
MONOCYTES NFR BLD: 10 %
NEUTROPHILS # BLD: 5.7 K/MCL (ref 1.8–7.7)
NEUTROPHILS NFR BLD: 68 %
NONHDLC SERPL-MCNC: 90 MG/DL
NRBC BLD MANUAL-RTO: 0 /100 WBC
PLATELET # BLD AUTO: 286 K/MCL (ref 140–450)
POTASSIUM SERPL-SCNC: 4.7 MMOL/L (ref 3.4–5.1)
PROT SERPL-MCNC: 6.7 G/DL (ref 6.4–8.2)
RBC # BLD: 4.67 MIL/MCL (ref 4–5.2)
SODIUM SERPL-SCNC: 140 MMOL/L (ref 135–145)
TRIGL SERPL-MCNC: 66 MG/DL
WBC # BLD: 8.4 K/MCL (ref 4.2–11)

## 2023-10-02 PROCEDURE — 85025 COMPLETE CBC W/AUTO DIFF WBC: CPT | Performed by: CLINICAL MEDICAL LABORATORY

## 2023-10-02 PROCEDURE — 80061 LIPID PANEL: CPT | Performed by: CLINICAL MEDICAL LABORATORY

## 2023-10-02 PROCEDURE — 80053 COMPREHEN METABOLIC PANEL: CPT | Performed by: CLINICAL MEDICAL LABORATORY

## 2023-10-02 PROCEDURE — 36415 COLL VENOUS BLD VENIPUNCTURE: CPT | Performed by: CLINICAL MEDICAL LABORATORY

## 2023-10-02 PROCEDURE — 82306 VITAMIN D 25 HYDROXY: CPT | Performed by: CLINICAL MEDICAL LABORATORY

## 2023-10-05 ENCOUNTER — OFFICE VISIT (OUTPATIENT)
Dept: CARDIOLOGY | Age: 68
End: 2023-10-05

## 2023-10-05 VITALS
HEART RATE: 62 BPM | DIASTOLIC BLOOD PRESSURE: 82 MMHG | BODY MASS INDEX: 31.88 KG/M2 | WEIGHT: 186.73 LBS | HEIGHT: 64 IN | SYSTOLIC BLOOD PRESSURE: 124 MMHG | OXYGEN SATURATION: 100 %

## 2023-10-05 DIAGNOSIS — G47.33 OSA ON CPAP: ICD-10-CM

## 2023-10-05 DIAGNOSIS — I10 BENIGN ESSENTIAL HYPERTENSION: ICD-10-CM

## 2023-10-05 DIAGNOSIS — E55.9 HYPOVITAMINOSIS D: ICD-10-CM

## 2023-10-05 DIAGNOSIS — I73.00 RAYNAUD'S SYNDROME WITHOUT GANGRENE: Primary | ICD-10-CM

## 2023-10-05 DIAGNOSIS — C54.1 ENDOMETRIAL ADENOCARCINOMA (CMD): ICD-10-CM

## 2023-10-05 DIAGNOSIS — E78.2 MIXED HYPERLIPIDEMIA: ICD-10-CM

## 2023-10-05 PROCEDURE — 99215 OFFICE O/P EST HI 40 MIN: CPT | Performed by: INTERNAL MEDICINE

## 2023-10-05 RX ORDER — ASPIRIN 325 MG
325 TABLET, DELAYED RELEASE (ENTERIC COATED) ORAL DAILY
COMMUNITY

## 2023-10-05 RX ORDER — GABAPENTIN 300 MG/1
CAPSULE ORAL
COMMUNITY
Start: 2023-09-24

## 2023-10-05 SDOH — HEALTH STABILITY: MENTAL HEALTH: FEELING DOWN, DEPRESSED OR HOPELESS?: NOT AT ALL

## 2023-10-05 SDOH — HEALTH STABILITY: PHYSICAL HEALTH: ON AVERAGE, HOW MANY DAYS PER WEEK DO YOU ENGAGE IN MODERATE TO STRENUOUS EXERCISE (LIKE A BRISK WALK)?: 0 DAYS

## 2023-10-05 SDOH — HEALTH STABILITY: MENTAL HEALTH: DEPRESSION SCREENING SCORE: 0

## 2023-10-05 SDOH — HEALTH STABILITY: PHYSICAL HEALTH: ON AVERAGE, HOW MANY MINUTES DO YOU ENGAGE IN EXERCISE AT THIS LEVEL?: 0 MIN

## 2023-10-05 SDOH — HEALTH STABILITY: MENTAL HEALTH: LITTLE INTEREST OR PLEASURE IN ACTIVITY?: NOT AT ALL

## 2023-10-05 SDOH — HEALTH STABILITY: MENTAL HEALTH: PHQ2 INTERPRETATION: NO FURTHER SCREENING NEEDED

## 2023-10-05 ASSESSMENT — PATIENT HEALTH QUESTIONNAIRE - PHQ9: SUM OF ALL RESPONSES TO PHQ9 QUESTIONS 1 AND 2: 0

## 2023-10-06 DIAGNOSIS — E78.2 MIXED HYPERLIPIDEMIA: ICD-10-CM

## 2023-10-06 DIAGNOSIS — I10 BENIGN ESSENTIAL HYPERTENSION: ICD-10-CM

## 2023-10-06 RX ORDER — LISINOPRIL 10 MG/1
10 TABLET ORAL DAILY
Qty: 90 TABLET | Refills: 3 | Status: SHIPPED | OUTPATIENT
Start: 2023-10-06

## 2023-10-06 RX ORDER — ATORVASTATIN CALCIUM 20 MG/1
TABLET, FILM COATED ORAL
Qty: 90 TABLET | Refills: 1 | Status: SHIPPED | OUTPATIENT
Start: 2023-10-06

## 2023-10-10 ENCOUNTER — E-ADVICE (OUTPATIENT)
Dept: OTHER | Age: 68
End: 2023-10-10

## 2023-10-13 ENCOUNTER — TELEPHONE (OUTPATIENT)
Dept: FAMILY MEDICINE | Age: 68
End: 2023-10-13

## 2023-11-02 ENCOUNTER — APPOINTMENT (OUTPATIENT)
Dept: FAMILY MEDICINE | Age: 68
End: 2023-11-02

## 2023-11-09 ENCOUNTER — APPOINTMENT (OUTPATIENT)
Dept: FAMILY MEDICINE | Age: 68
End: 2023-11-09

## 2023-11-20 ENCOUNTER — APPOINTMENT (OUTPATIENT)
Dept: FAMILY MEDICINE | Age: 68
End: 2023-11-20

## 2023-11-20 VITALS
WEIGHT: 189 LBS | SYSTOLIC BLOOD PRESSURE: 122 MMHG | HEART RATE: 64 BPM | RESPIRATION RATE: 16 BRPM | BODY MASS INDEX: 33.49 KG/M2 | HEIGHT: 63 IN | OXYGEN SATURATION: 98 % | DIASTOLIC BLOOD PRESSURE: 64 MMHG | TEMPERATURE: 97.4 F

## 2023-11-20 DIAGNOSIS — M16.12 PRIMARY OSTEOARTHRITIS OF LEFT HIP: ICD-10-CM

## 2023-11-20 DIAGNOSIS — Z01.818 PREOP EXAMINATION: Primary | ICD-10-CM

## 2023-11-20 PROBLEM — N20.0 BILATERAL KIDNEY STONES: Status: ACTIVE | Noted: 2023-07-10

## 2023-11-20 PROBLEM — I35.0 AORTIC STENOSIS: Status: ACTIVE | Noted: 2023-11-20

## 2023-11-20 PROBLEM — N20.0 BILATERAL KIDNEY STONES: Status: RESOLVED | Noted: 2023-07-10 | Resolved: 2023-11-20

## 2023-11-20 PROCEDURE — 99214 OFFICE O/P EST MOD 30 MIN: CPT | Performed by: PHYSICIAN ASSISTANT

## 2023-11-20 RX ORDER — PANTOPRAZOLE SODIUM 40 MG/1
TABLET, DELAYED RELEASE ORAL
COMMUNITY
Start: 2023-11-17

## 2023-11-20 RX ORDER — TRAMADOL HYDROCHLORIDE 50 MG/1
TABLET ORAL
COMMUNITY
Start: 2023-11-17

## 2023-11-20 RX ORDER — OXYCODONE HYDROCHLORIDE 5 MG/1
TABLET ORAL
COMMUNITY
Start: 2023-11-17

## 2023-11-20 RX ORDER — APIXABAN 2.5 MG/1
TABLET, FILM COATED ORAL
COMMUNITY
Start: 2023-11-17 | End: 2023-11-22 | Stop reason: ALTCHOICE

## 2023-11-20 ASSESSMENT — ENCOUNTER SYMPTOMS
SHORTNESS OF BREATH: 0
COUGH: 0
DIZZINESS: 0
BRUISES/BLEEDS EASILY: 0
FEVER: 0
ABDOMINAL PAIN: 0

## 2023-11-20 ASSESSMENT — PATIENT HEALTH QUESTIONNAIRE - PHQ9
CLINICAL INTERPRETATION OF PHQ2 SCORE: NO FURTHER SCREENING NEEDED
SUM OF ALL RESPONSES TO PHQ9 QUESTIONS 1 AND 2: 0
1. LITTLE INTEREST OR PLEASURE IN DOING THINGS: NOT AT ALL
2. FEELING DOWN, DEPRESSED OR HOPELESS: NOT AT ALL
SUM OF ALL RESPONSES TO PHQ9 QUESTIONS 1 AND 2: 0

## 2023-11-21 ENCOUNTER — TELEPHONE (OUTPATIENT)
Dept: CARDIOLOGY | Age: 68
End: 2023-11-21

## 2023-11-22 ENCOUNTER — TELEPHONE (OUTPATIENT)
Dept: FAMILY MEDICINE | Age: 68
End: 2023-11-22

## 2023-11-24 ENCOUNTER — TELEPHONE (OUTPATIENT)
Dept: FAMILY MEDICINE | Age: 68
End: 2023-11-24

## 2024-01-10 ENCOUNTER — APPOINTMENT (OUTPATIENT)
Dept: FAMILY MEDICINE | Age: 69
End: 2024-01-10

## 2024-01-10 ENCOUNTER — EXTERNAL RECORD (OUTPATIENT)
Dept: HEALTH INFORMATION MANAGEMENT | Facility: OTHER | Age: 69
End: 2024-01-10

## 2024-01-11 ENCOUNTER — TELEPHONE (OUTPATIENT)
Dept: CARDIOLOGY | Age: 69
End: 2024-01-11

## 2024-01-11 ENCOUNTER — CLINICAL DOCUMENTATION (OUTPATIENT)
Dept: CARDIOLOGY | Age: 69
End: 2024-01-11

## 2024-01-15 ENCOUNTER — HOSPITAL ENCOUNTER (OUTPATIENT)
Dept: GENERAL RADIOLOGY | Age: 69
Discharge: HOME | End: 2024-01-15

## 2024-01-15 ENCOUNTER — APPOINTMENT (OUTPATIENT)
Dept: FAMILY MEDICINE | Age: 69
End: 2024-01-15

## 2024-01-15 VITALS
HEIGHT: 63 IN | RESPIRATION RATE: 17 BRPM | SYSTOLIC BLOOD PRESSURE: 122 MMHG | DIASTOLIC BLOOD PRESSURE: 60 MMHG | TEMPERATURE: 96.9 F | OXYGEN SATURATION: 98 % | HEART RATE: 62 BPM | WEIGHT: 193.8 LBS | BODY MASS INDEX: 34.34 KG/M2

## 2024-01-15 DIAGNOSIS — Z01.818 PRE-OP EVALUATION: ICD-10-CM

## 2024-01-15 DIAGNOSIS — I10 BENIGN ESSENTIAL HYPERTENSION: ICD-10-CM

## 2024-01-15 DIAGNOSIS — N39.0 URINARY TRACT INFECTION WITHOUT HEMATURIA, SITE UNSPECIFIED: ICD-10-CM

## 2024-01-15 DIAGNOSIS — R31.9 URINARY TRACT INFECTION WITH HEMATURIA, SITE UNSPECIFIED: ICD-10-CM

## 2024-01-15 DIAGNOSIS — G47.33 OSA ON CPAP: ICD-10-CM

## 2024-01-15 DIAGNOSIS — E78.2 MIXED HYPERLIPIDEMIA: Primary | ICD-10-CM

## 2024-01-15 DIAGNOSIS — Z12.11 COLON CANCER SCREENING: ICD-10-CM

## 2024-01-15 DIAGNOSIS — E66.9 OBESITY (BMI 30.0-34.9): ICD-10-CM

## 2024-01-15 DIAGNOSIS — C54.1 ENDOMETRIAL ADENOCARCINOMA (CMD): ICD-10-CM

## 2024-01-15 DIAGNOSIS — E78.2 MIXED HYPERLIPIDEMIA: ICD-10-CM

## 2024-01-15 DIAGNOSIS — R79.1 ABNORMAL COAGULATION PROFILE: ICD-10-CM

## 2024-01-15 DIAGNOSIS — M16.12 OSTEOARTHRITIS OF ONE HIP, LEFT: ICD-10-CM

## 2024-01-15 DIAGNOSIS — I35.0 NONRHEUMATIC AORTIC VALVE STENOSIS: ICD-10-CM

## 2024-01-15 DIAGNOSIS — N39.0 URINARY TRACT INFECTION WITH HEMATURIA, SITE UNSPECIFIED: ICD-10-CM

## 2024-01-15 PROCEDURE — 36415 COLL VENOUS BLD VENIPUNCTURE: CPT | Performed by: FAMILY MEDICINE

## 2024-01-15 PROCEDURE — 80053 COMPREHEN METABOLIC PANEL: CPT | Performed by: CLINICAL MEDICAL LABORATORY

## 2024-01-15 PROCEDURE — 85730 THROMBOPLASTIN TIME PARTIAL: CPT | Performed by: CLINICAL MEDICAL LABORATORY

## 2024-01-15 PROCEDURE — 87088 URINE BACTERIA CULTURE: CPT | Performed by: CLINICAL MEDICAL LABORATORY

## 2024-01-15 PROCEDURE — 81001 URINALYSIS AUTO W/SCOPE: CPT | Performed by: CLINICAL MEDICAL LABORATORY

## 2024-01-15 PROCEDURE — 85610 PROTHROMBIN TIME: CPT | Performed by: CLINICAL MEDICAL LABORATORY

## 2024-01-15 PROCEDURE — 87186 SC STD MICRODIL/AGAR DIL: CPT | Performed by: CLINICAL MEDICAL LABORATORY

## 2024-01-15 PROCEDURE — 87086 URINE CULTURE/COLONY COUNT: CPT | Performed by: CLINICAL MEDICAL LABORATORY

## 2024-01-15 PROCEDURE — 71046 X-RAY EXAM CHEST 2 VIEWS: CPT

## 2024-01-15 PROCEDURE — 87641 MR-STAPH DNA AMP PROBE: CPT | Performed by: CLINICAL MEDICAL LABORATORY

## 2024-01-15 PROCEDURE — 99214 OFFICE O/P EST MOD 30 MIN: CPT | Performed by: FAMILY MEDICINE

## 2024-01-15 PROCEDURE — 85025 COMPLETE CBC W/AUTO DIFF WBC: CPT | Performed by: CLINICAL MEDICAL LABORATORY

## 2024-01-15 RX ORDER — ACETAMINOPHEN 500 MG
TABLET ORAL
COMMUNITY
Start: 2023-11-16

## 2024-01-15 RX ORDER — CHLORHEXIDINE GLUCONATE 213 G/1000ML
SOLUTION TOPICAL
COMMUNITY
Start: 2023-11-16

## 2024-01-15 ASSESSMENT — PATIENT HEALTH QUESTIONNAIRE - PHQ9
CLINICAL INTERPRETATION OF PHQ2 SCORE: NO FURTHER SCREENING NEEDED
1. LITTLE INTEREST OR PLEASURE IN DOING THINGS: NOT AT ALL
SUM OF ALL RESPONSES TO PHQ9 QUESTIONS 1 AND 2: 0
SUM OF ALL RESPONSES TO PHQ9 QUESTIONS 1 AND 2: 0
2. FEELING DOWN, DEPRESSED OR HOPELESS: NOT AT ALL

## 2024-01-15 ASSESSMENT — ENCOUNTER SYMPTOMS
BACK PAIN: 0
APPETITE CHANGE: 0
RHINORRHEA: 0
CONSTIPATION: 0
WHEEZING: 0
EYES NEGATIVE: 1
SINUS PRESSURE: 0
SLEEP DISTURBANCE: 0
VOMITING: 0
NERVOUS/ANXIOUS: 0
DIARRHEA: 0
NAUSEA: 0
SPEECH DIFFICULTY: 0
CONFUSION: 0
HEADACHES: 0
COUGH: 0
ABDOMINAL DISTENTION: 0
FACIAL ASYMMETRY: 0
FEVER: 0
LIGHT-HEADEDNESS: 0
WOUND: 0
CHILLS: 0
SORE THROAT: 0
WEAKNESS: 0
FACIAL SWELLING: 0
TROUBLE SWALLOWING: 0
ADENOPATHY: 0
FATIGUE: 0
ACTIVITY CHANGE: 0
ABDOMINAL PAIN: 0
CHEST TIGHTNESS: 0
SEIZURES: 0
NUMBNESS: 0
HALLUCINATIONS: 0
SHORTNESS OF BREATH: 0
BLOOD IN STOOL: 0
DIZZINESS: 0
AGITATION: 0
SINUS PAIN: 0

## 2024-01-15 ASSESSMENT — PAIN SCALES - GENERAL: PAINLEVEL: 0

## 2024-01-16 ENCOUNTER — TELEPHONE (OUTPATIENT)
Dept: FAMILY MEDICINE | Age: 69
End: 2024-01-16

## 2024-01-16 ENCOUNTER — E-ADVICE (OUTPATIENT)
Dept: FAMILY MEDICINE | Age: 69
End: 2024-01-16

## 2024-01-16 LAB
ALBUMIN SERPL-MCNC: 4.4 G/DL (ref 3.6–5.1)
ALBUMIN/GLOB SERPL: 1.8 {RATIO} (ref 1–2.4)
ALP SERPL-CCNC: 71 UNITS/L (ref 45–117)
ALT SERPL-CCNC: 22 UNITS/L
AMORPH SED URNS QL MICRO: PRESENT
ANION GAP SERPL CALC-SCNC: 8 MMOL/L (ref 7–19)
APPEARANCE UR: ABNORMAL
APTT PPP: 74 SEC (ref 22–32)
AST SERPL-CCNC: 17 UNITS/L
BACTERIA #/AREA URNS HPF: ABNORMAL /HPF
BASOPHILS # BLD: 0.1 K/MCL (ref 0–0.3)
BASOPHILS NFR BLD: 2 %
BILIRUB SERPL-MCNC: 0.5 MG/DL (ref 0.2–1)
BILIRUB UR QL STRIP: NEGATIVE
BUN SERPL-MCNC: 35 MG/DL (ref 6–20)
BUN/CREAT SERPL: 50 (ref 7–25)
CALCIUM SERPL-MCNC: 9.6 MG/DL (ref 8.4–10.2)
CHLORIDE SERPL-SCNC: 112 MMOL/L (ref 97–110)
CO2 SERPL-SCNC: 22 MMOL/L (ref 21–32)
COLOR UR: ABNORMAL
CREAT SERPL-MCNC: 0.7 MG/DL (ref 0.51–0.95)
DEPRECATED RDW RBC: 44.5 FL (ref 39–50)
EGFRCR SERPLBLD CKD-EPI 2021: >90 ML/MIN/{1.73_M2}
EOSINOPHIL # BLD: 0.3 K/MCL (ref 0–0.5)
EOSINOPHIL NFR BLD: 7 %
ERYTHROCYTE [DISTWIDTH] IN BLOOD: 13.5 % (ref 11–15)
FASTING DURATION TIME PATIENT: ABNORMAL H
GLOBULIN SER-MCNC: 2.5 G/DL (ref 2–4)
GLUCOSE SERPL-MCNC: 100 MG/DL (ref 70–99)
GLUCOSE UR STRIP-MCNC: NEGATIVE MG/DL
HCT VFR BLD CALC: 41.3 % (ref 36–46.5)
HGB BLD-MCNC: 13.3 G/DL (ref 12–15.5)
HGB UR QL STRIP: NEGATIVE
HYALINE CASTS #/AREA URNS LPF: ABNORMAL /LPF
IMM GRANULOCYTES # BLD AUTO: 0 K/MCL (ref 0–0.2)
IMM GRANULOCYTES # BLD: 0 %
KETONES UR STRIP-MCNC: NEGATIVE MG/DL
LEUKOCYTE ESTERASE UR QL STRIP: ABNORMAL
LYMPHOCYTES # BLD: 1 K/MCL (ref 1–4)
LYMPHOCYTES NFR BLD: 22 %
MCH RBC QN AUTO: 29 PG (ref 26–34)
MCHC RBC AUTO-ENTMCNC: 32.2 G/DL (ref 32–36.5)
MCV RBC AUTO: 90.2 FL (ref 78–100)
MONOCYTES # BLD: 0.6 K/MCL (ref 0.3–0.9)
MONOCYTES NFR BLD: 13 %
MRSA DNA SPEC QL NAA+PROBE: NOT DETECTED
MUCOUS THREADS URNS QL MICRO: PRESENT
NEUTROPHILS # BLD: 2.5 K/MCL (ref 1.8–7.7)
NEUTROPHILS NFR BLD: 56 %
NITRITE UR QL STRIP: POSITIVE
NRBC BLD MANUAL-RTO: 0 /100 WBC
PH UR STRIP: 6 [PH] (ref 5–7)
PLATELET # BLD AUTO: 259 K/MCL (ref 140–450)
POTASSIUM SERPL-SCNC: 4.3 MMOL/L (ref 3.4–5.1)
PROT SERPL-MCNC: 6.9 G/DL (ref 6.4–8.2)
PROT UR STRIP-MCNC: NEGATIVE MG/DL
PROTHROMBIN TIME: 145.8 SEC (ref 9.7–11.8)
RBC # BLD: 4.58 MIL/MCL (ref 4–5.2)
RBC #/AREA URNS HPF: ABNORMAL /HPF
SODIUM SERPL-SCNC: 138 MMOL/L (ref 135–145)
SP GR UR STRIP: 1.02 (ref 1–1.03)
SQUAMOUS #/AREA URNS HPF: ABNORMAL /HPF
UROBILINOGEN UR STRIP-MCNC: 0.2 MG/DL
WBC # BLD: 4.4 K/MCL (ref 4.2–11)
WBC #/AREA URNS HPF: ABNORMAL /HPF

## 2024-01-16 RX ORDER — CEPHALEXIN 500 MG/1
500 CAPSULE ORAL 2 TIMES DAILY
Qty: 14 CAPSULE | Refills: 0 | Status: SHIPPED | OUTPATIENT
Start: 2024-01-16 | End: 2024-01-23

## 2024-01-18 LAB — BACTERIA UR CULT: ABNORMAL

## 2024-01-22 ENCOUNTER — OFFICE VISIT (OUTPATIENT)
Dept: HEMATOLOGY/ONCOLOGY | Age: 69
End: 2024-01-22
Attending: INTERNAL MEDICINE

## 2024-01-22 ENCOUNTER — LAB SERVICES (OUTPATIENT)
Dept: LAB | Age: 69
End: 2024-01-22

## 2024-01-22 VITALS
TEMPERATURE: 98.6 F | DIASTOLIC BLOOD PRESSURE: 69 MMHG | RESPIRATION RATE: 14 BRPM | BODY MASS INDEX: 34.78 KG/M2 | WEIGHT: 196.3 LBS | HEIGHT: 63 IN | OXYGEN SATURATION: 98 % | SYSTOLIC BLOOD PRESSURE: 131 MMHG | HEART RATE: 64 BPM

## 2024-01-22 DIAGNOSIS — E66.9 OBESITY (BMI 30.0-34.9): ICD-10-CM

## 2024-01-22 DIAGNOSIS — C54.1 ENDOMETRIAL ADENOCARCINOMA (CMD): ICD-10-CM

## 2024-01-22 DIAGNOSIS — R79.1 ABNORMAL COAGULATION PROFILE: ICD-10-CM

## 2024-01-22 DIAGNOSIS — Z01.818 PRE-OP EVALUATION: ICD-10-CM

## 2024-01-22 DIAGNOSIS — R79.1 ABNORMAL COAGULATION PROFILE: Primary | ICD-10-CM

## 2024-01-22 LAB
FIBRINOGEN PPP-MCNC: 354 MG/DL (ref 190–425)
INR PPP: 1.1
PROTHROMBIN TIME: 11.4 SEC (ref 9.7–11.8)

## 2024-01-22 PROCEDURE — 85250 CLOT FACTOR IX PTC/CHRSTMAS: CPT | Performed by: CLINICAL MEDICAL LABORATORY

## 2024-01-22 PROCEDURE — 36415 COLL VENOUS BLD VENIPUNCTURE: CPT | Performed by: CLINICAL MEDICAL LABORATORY

## 2024-01-22 PROCEDURE — 85210 CLOT FACTOR II PROTHROM SPEC: CPT | Performed by: CLINICAL MEDICAL LABORATORY

## 2024-01-22 PROCEDURE — 85230 CLOT FACTOR VII PROCONVERTIN: CPT | Performed by: CLINICAL MEDICAL LABORATORY

## 2024-01-22 PROCEDURE — 85220 BLOOC CLOT FACTOR V TEST: CPT | Performed by: CLINICAL MEDICAL LABORATORY

## 2024-01-22 PROCEDURE — 85240 CLOT FACTOR VIII AHG 1 STAGE: CPT | Performed by: CLINICAL MEDICAL LABORATORY

## 2024-01-22 PROCEDURE — 99202 OFFICE O/P NEW SF 15 MIN: CPT

## 2024-01-22 PROCEDURE — 99205 OFFICE O/P NEW HI 60 MIN: CPT | Performed by: INTERNAL MEDICINE

## 2024-01-22 PROCEDURE — 85610 PROTHROMBIN TIME: CPT | Performed by: CLINICAL MEDICAL LABORATORY

## 2024-01-22 PROCEDURE — 85260 CLOT FACTOR X STUART-POWER: CPT | Performed by: CLINICAL MEDICAL LABORATORY

## 2024-01-22 PROCEDURE — 85384 FIBRINOGEN ACTIVITY: CPT | Performed by: CLINICAL MEDICAL LABORATORY

## 2024-01-22 ASSESSMENT — ENCOUNTER SYMPTOMS
ABDOMINAL PAIN: 0
CONFUSION: 0
CONSTIPATION: 0
FATIGUE: 0
HEADACHES: 0
SLEEP DISTURBANCE: 0
VOICE CHANGE: 0
DIZZINESS: 0
APPETITE CHANGE: 0
WEAKNESS: 0
APNEA: 0
BRUISES/BLEEDS EASILY: 0
VOMITING: 0
FEVER: 0
DIAPHORESIS: 0
BLOOD IN STOOL: 0
COUGH: 0
TROUBLE SWALLOWING: 0
BACK PAIN: 1
SPEECH DIFFICULTY: 0
ACTIVITY CHANGE: 0
CHEST TIGHTNESS: 0
SHORTNESS OF BREATH: 0
WHEEZING: 0
ADENOPATHY: 0
LIGHT-HEADEDNESS: 0
DIARRHEA: 0
UNEXPECTED WEIGHT CHANGE: 0
CHILLS: 0
CHOKING: 0
NAUSEA: 0
ABDOMINAL DISTENTION: 0

## 2024-01-22 ASSESSMENT — PAIN SCALES - GENERAL: PAINLEVEL: 0

## 2024-01-23 LAB
APTT 2H NP PPP: NORMAL S
APTT IMM NP PPP: NORMAL S
APTT PPP: 28 SEC
FACT IX ACT/NOR PPP: 107 % (ref 50–150)
FACT V ACT/NOR PPP: 130 % (ref 50–150)
FACT VII ACT/NOR PPP: 87 % (ref 50–150)
FACT VIII ACT/NOR PPP: 106 % (ref 50–150)
FACT X ACT/NOR PPP: 106 % (ref 50–150)
PROTHROM ACT/NOR PPP: 99 % (ref 50–150)
PROTHROMBIN TIME: 11.2 SEC
PT 2H NP PPP: NORMAL S
PT IMM NP PPP: NORMAL S
SERVICE CMNT-IMP: NORMAL
THROMBIN TIME: 20.4 SEC (ref 15.3–21.1)

## 2024-01-24 ENCOUNTER — APPOINTMENT (OUTPATIENT)
Dept: FAMILY MEDICINE | Age: 69
End: 2024-01-24

## 2024-01-24 ENCOUNTER — TELEPHONE (OUTPATIENT)
Dept: HEMATOLOGY/ONCOLOGY | Age: 69
End: 2024-01-24

## 2024-01-24 DIAGNOSIS — N39.0 URINARY TRACT INFECTION WITHOUT HEMATURIA, SITE UNSPECIFIED: ICD-10-CM

## 2024-01-25 LAB
APPEARANCE UR: CLEAR
BILIRUB UR QL STRIP: NEGATIVE
COLOR UR: COLORLESS
GLUCOSE UR STRIP-MCNC: NEGATIVE MG/DL
HGB UR QL STRIP: NEGATIVE
KETONES UR STRIP-MCNC: NEGATIVE MG/DL
LEUKOCYTE ESTERASE UR QL STRIP: NEGATIVE
NITRITE UR QL STRIP: NEGATIVE
PH UR STRIP: 7 [PH] (ref 5–7)
PROT UR STRIP-MCNC: NEGATIVE MG/DL
SP GR UR STRIP: 1.01 (ref 1–1.03)
UROBILINOGEN UR STRIP-MCNC: 0.2 MG/DL

## 2024-01-30 ENCOUNTER — LAB ENCOUNTER (OUTPATIENT)
Dept: LAB | Age: 69
End: 2024-01-30
Attending: STUDENT IN AN ORGANIZED HEALTH CARE EDUCATION/TRAINING PROGRAM
Payer: MEDICARE

## 2024-01-30 DIAGNOSIS — Z01.818 PRE-OP TESTING: ICD-10-CM

## 2024-01-30 LAB
ANTIBODY SCREEN: NEGATIVE
RH BLOOD TYPE: POSITIVE
RH BLOOD TYPE: POSITIVE

## 2024-01-30 PROCEDURE — 86900 BLOOD TYPING SEROLOGIC ABO: CPT

## 2024-01-30 PROCEDURE — 86901 BLOOD TYPING SEROLOGIC RH(D): CPT

## 2024-01-30 PROCEDURE — 86850 RBC ANTIBODY SCREEN: CPT

## 2024-01-30 RX ORDER — LISINOPRIL 10 MG/1
10 TABLET ORAL DAILY
COMMUNITY
Start: 2018-01-01

## 2024-01-30 RX ORDER — ATORVASTATIN CALCIUM 20 MG/1
1 TABLET, FILM COATED ORAL NIGHTLY
COMMUNITY
Start: 2018-07-13

## 2024-01-31 PROBLEM — Z01.818 PRE-OP TESTING: Status: ACTIVE | Noted: 2024-01-31

## 2024-02-01 ENCOUNTER — ANESTHESIA EVENT (OUTPATIENT)
Dept: SURGERY | Facility: HOSPITAL | Age: 69
End: 2024-02-01
Payer: MEDICARE

## 2024-02-02 ENCOUNTER — ANESTHESIA (OUTPATIENT)
Dept: SURGERY | Facility: HOSPITAL | Age: 69
End: 2024-02-02
Payer: MEDICARE

## 2024-02-02 ENCOUNTER — APPOINTMENT (OUTPATIENT)
Dept: GENERAL RADIOLOGY | Facility: HOSPITAL | Age: 69
End: 2024-02-02
Attending: STUDENT IN AN ORGANIZED HEALTH CARE EDUCATION/TRAINING PROGRAM
Payer: MEDICARE

## 2024-02-02 ENCOUNTER — HOSPITAL ENCOUNTER (OUTPATIENT)
Facility: HOSPITAL | Age: 69
Discharge: HOME HEALTH | End: 2024-02-03
Attending: STUDENT IN AN ORGANIZED HEALTH CARE EDUCATION/TRAINING PROGRAM | Admitting: STUDENT IN AN ORGANIZED HEALTH CARE EDUCATION/TRAINING PROGRAM
Payer: MEDICARE

## 2024-02-02 DIAGNOSIS — Z01.818 PRE-OP TESTING: Primary | ICD-10-CM

## 2024-02-02 PROBLEM — M16.12 PRIMARY OSTEOARTHRITIS OF LEFT HIP: Status: ACTIVE | Noted: 2024-02-02

## 2024-02-02 PROBLEM — I10 ESSENTIAL HYPERTENSION: Status: ACTIVE | Noted: 2024-02-02

## 2024-02-02 PROBLEM — E78.5 HYPERLIPIDEMIA: Status: ACTIVE | Noted: 2024-02-02

## 2024-02-02 PROCEDURE — 0SRB03Z REPLACEMENT OF LEFT HIP JOINT WITH CERAMIC SYNTHETIC SUBSTITUTE, OPEN APPROACH: ICD-10-PCS | Performed by: STUDENT IN AN ORGANIZED HEALTH CARE EDUCATION/TRAINING PROGRAM

## 2024-02-02 PROCEDURE — 76000 FLUOROSCOPY <1 HR PHYS/QHP: CPT | Performed by: STUDENT IN AN ORGANIZED HEALTH CARE EDUCATION/TRAINING PROGRAM

## 2024-02-02 PROCEDURE — 97116 GAIT TRAINING THERAPY: CPT

## 2024-02-02 PROCEDURE — 97530 THERAPEUTIC ACTIVITIES: CPT

## 2024-02-02 PROCEDURE — 97161 PT EVAL LOW COMPLEX 20 MIN: CPT

## 2024-02-02 PROCEDURE — 72170 X-RAY EXAM OF PELVIS: CPT | Performed by: STUDENT IN AN ORGANIZED HEALTH CARE EDUCATION/TRAINING PROGRAM

## 2024-02-02 PROCEDURE — 88305 TISSUE EXAM BY PATHOLOGIST: CPT | Performed by: STUDENT IN AN ORGANIZED HEALTH CARE EDUCATION/TRAINING PROGRAM

## 2024-02-02 PROCEDURE — 88311 DECALCIFY TISSUE: CPT | Performed by: STUDENT IN AN ORGANIZED HEALTH CARE EDUCATION/TRAINING PROGRAM

## 2024-02-02 DEVICE — IMPLANTABLE DEVICE: Type: IMPLANTABLE DEVICE | Site: HIP | Status: FUNCTIONAL

## 2024-02-02 DEVICE — IMPLANTABLE DEVICE: Type: IMPLANTABLE DEVICE | Status: FUNCTIONAL

## 2024-02-02 RX ORDER — DOCUSATE SODIUM 100 MG/1
100 CAPSULE, LIQUID FILLED ORAL 2 TIMES DAILY
Status: DISCONTINUED | OUTPATIENT
Start: 2024-02-02 | End: 2024-02-03

## 2024-02-02 RX ORDER — DEXAMETHASONE SODIUM PHOSPHATE 4 MG/ML
VIAL (ML) INJECTION AS NEEDED
Status: DISCONTINUED | OUTPATIENT
Start: 2024-02-02 | End: 2024-02-02 | Stop reason: SURG

## 2024-02-02 RX ORDER — CEFAZOLIN SODIUM/WATER 2 G/20 ML
2 SYRINGE (ML) INTRAVENOUS EVERY 8 HOURS
Qty: 40 ML | Refills: 0 | Status: COMPLETED | OUTPATIENT
Start: 2024-02-02 | End: 2024-02-03

## 2024-02-02 RX ORDER — MIDAZOLAM HYDROCHLORIDE 1 MG/ML
INJECTION INTRAMUSCULAR; INTRAVENOUS AS NEEDED
Status: DISCONTINUED | OUTPATIENT
Start: 2024-02-02 | End: 2024-02-02 | Stop reason: SURG

## 2024-02-02 RX ORDER — ASPIRIN 81 MG/1
81 TABLET ORAL 2 TIMES DAILY
Status: DISCONTINUED | OUTPATIENT
Start: 2024-02-02 | End: 2024-02-03

## 2024-02-02 RX ORDER — GLYCOPYRROLATE 0.2 MG/ML
INJECTION, SOLUTION INTRAMUSCULAR; INTRAVENOUS AS NEEDED
Status: DISCONTINUED | OUTPATIENT
Start: 2024-02-02 | End: 2024-02-02 | Stop reason: SURG

## 2024-02-02 RX ORDER — SODIUM CHLORIDE 9 MG/ML
INJECTION, SOLUTION INTRAVENOUS CONTINUOUS
Status: DISCONTINUED | OUTPATIENT
Start: 2024-02-02 | End: 2024-02-03

## 2024-02-02 RX ORDER — ONDANSETRON 2 MG/ML
4 INJECTION INTRAMUSCULAR; INTRAVENOUS EVERY 6 HOURS PRN
Status: DISCONTINUED | OUTPATIENT
Start: 2024-02-02 | End: 2024-02-03

## 2024-02-02 RX ORDER — FAMOTIDINE 10 MG/ML
20 INJECTION, SOLUTION INTRAVENOUS 2 TIMES DAILY
Status: DISCONTINUED | OUTPATIENT
Start: 2024-02-02 | End: 2024-02-03

## 2024-02-02 RX ORDER — ACETAMINOPHEN 500 MG
1000 TABLET ORAL ONCE
Status: DISCONTINUED | OUTPATIENT
Start: 2024-02-02 | End: 2024-02-02 | Stop reason: HOSPADM

## 2024-02-02 RX ORDER — SCOLOPAMINE TRANSDERMAL SYSTEM 1 MG/1
1 PATCH, EXTENDED RELEASE TRANSDERMAL
Status: DISCONTINUED | OUTPATIENT
Start: 2024-02-02 | End: 2024-02-02 | Stop reason: HOSPADM

## 2024-02-02 RX ORDER — HYDROMORPHONE HYDROCHLORIDE 1 MG/ML
0.6 INJECTION, SOLUTION INTRAMUSCULAR; INTRAVENOUS; SUBCUTANEOUS EVERY 5 MIN PRN
Status: DISCONTINUED | OUTPATIENT
Start: 2024-02-02 | End: 2024-02-02 | Stop reason: HOSPADM

## 2024-02-02 RX ORDER — METOCLOPRAMIDE 10 MG/1
10 TABLET ORAL ONCE
Status: DISCONTINUED | OUTPATIENT
Start: 2024-02-02 | End: 2024-02-02 | Stop reason: HOSPADM

## 2024-02-02 RX ORDER — ONDANSETRON 2 MG/ML
4 INJECTION INTRAMUSCULAR; INTRAVENOUS EVERY 6 HOURS PRN
Status: DISCONTINUED | OUTPATIENT
Start: 2024-02-02 | End: 2024-02-02 | Stop reason: HOSPADM

## 2024-02-02 RX ORDER — EPHEDRINE SULFATE 50 MG/ML
INJECTION, SOLUTION INTRAVENOUS AS NEEDED
Status: DISCONTINUED | OUTPATIENT
Start: 2024-02-02 | End: 2024-02-02 | Stop reason: SURG

## 2024-02-02 RX ORDER — LISINOPRIL 10 MG/1
10 TABLET ORAL DAILY
Status: DISCONTINUED | OUTPATIENT
Start: 2024-02-03 | End: 2024-02-03

## 2024-02-02 RX ORDER — ENEMA 19; 7 G/133ML; G/133ML
1 ENEMA RECTAL ONCE AS NEEDED
Status: DISCONTINUED | OUTPATIENT
Start: 2024-02-02 | End: 2024-02-03

## 2024-02-02 RX ORDER — HYDROMORPHONE HYDROCHLORIDE 1 MG/ML
0.4 INJECTION, SOLUTION INTRAMUSCULAR; INTRAVENOUS; SUBCUTANEOUS EVERY 5 MIN PRN
Status: DISCONTINUED | OUTPATIENT
Start: 2024-02-02 | End: 2024-02-02 | Stop reason: HOSPADM

## 2024-02-02 RX ORDER — KETOROLAC TROMETHAMINE 15 MG/ML
15 INJECTION, SOLUTION INTRAMUSCULAR; INTRAVENOUS EVERY 6 HOURS
Status: DISCONTINUED | OUTPATIENT
Start: 2024-02-02 | End: 2024-02-03

## 2024-02-02 RX ORDER — HYDROMORPHONE HYDROCHLORIDE 1 MG/ML
0.2 INJECTION, SOLUTION INTRAMUSCULAR; INTRAVENOUS; SUBCUTANEOUS EVERY 5 MIN PRN
Status: DISCONTINUED | OUTPATIENT
Start: 2024-02-02 | End: 2024-02-02 | Stop reason: HOSPADM

## 2024-02-02 RX ORDER — NEOSTIGMINE METHYLSULFATE 1 MG/ML
INJECTION, SOLUTION INTRAVENOUS AS NEEDED
Status: DISCONTINUED | OUTPATIENT
Start: 2024-02-02 | End: 2024-02-02 | Stop reason: SURG

## 2024-02-02 RX ORDER — SENNOSIDES 8.6 MG
17.2 TABLET ORAL NIGHTLY
Status: DISCONTINUED | OUTPATIENT
Start: 2024-02-02 | End: 2024-02-03

## 2024-02-02 RX ORDER — ONDANSETRON 2 MG/ML
INJECTION INTRAMUSCULAR; INTRAVENOUS AS NEEDED
Status: DISCONTINUED | OUTPATIENT
Start: 2024-02-02 | End: 2024-02-02 | Stop reason: SURG

## 2024-02-02 RX ORDER — NALOXONE HYDROCHLORIDE 0.4 MG/ML
80 INJECTION, SOLUTION INTRAMUSCULAR; INTRAVENOUS; SUBCUTANEOUS AS NEEDED
Status: DISCONTINUED | OUTPATIENT
Start: 2024-02-02 | End: 2024-02-02 | Stop reason: HOSPADM

## 2024-02-02 RX ORDER — SODIUM CHLORIDE, SODIUM LACTATE, POTASSIUM CHLORIDE, CALCIUM CHLORIDE 600; 310; 30; 20 MG/100ML; MG/100ML; MG/100ML; MG/100ML
INJECTION, SOLUTION INTRAVENOUS CONTINUOUS
Status: DISCONTINUED | OUTPATIENT
Start: 2024-02-02 | End: 2024-02-02 | Stop reason: HOSPADM

## 2024-02-02 RX ORDER — FAMOTIDINE 20 MG/1
20 TABLET, FILM COATED ORAL ONCE
Status: DISCONTINUED | OUTPATIENT
Start: 2024-02-02 | End: 2024-02-02 | Stop reason: HOSPADM

## 2024-02-02 RX ORDER — LIDOCAINE HYDROCHLORIDE 10 MG/ML
INJECTION, SOLUTION EPIDURAL; INFILTRATION; INTRACAUDAL; PERINEURAL AS NEEDED
Status: DISCONTINUED | OUTPATIENT
Start: 2024-02-02 | End: 2024-02-02 | Stop reason: SURG

## 2024-02-02 RX ORDER — METOCLOPRAMIDE HYDROCHLORIDE 5 MG/ML
10 INJECTION INTRAMUSCULAR; INTRAVENOUS ONCE
Status: DISCONTINUED | OUTPATIENT
Start: 2024-02-02 | End: 2024-02-02 | Stop reason: HOSPADM

## 2024-02-02 RX ORDER — MORPHINE SULFATE 4 MG/ML
2 INJECTION, SOLUTION INTRAMUSCULAR; INTRAVENOUS EVERY 10 MIN PRN
Status: DISCONTINUED | OUTPATIENT
Start: 2024-02-02 | End: 2024-02-02 | Stop reason: HOSPADM

## 2024-02-02 RX ORDER — OXYCODONE HYDROCHLORIDE 5 MG/1
5 TABLET ORAL EVERY 4 HOURS PRN
Status: DISCONTINUED | OUTPATIENT
Start: 2024-02-02 | End: 2024-02-03

## 2024-02-02 RX ORDER — HYDROMORPHONE HYDROCHLORIDE 1 MG/ML
INJECTION, SOLUTION INTRAMUSCULAR; INTRAVENOUS; SUBCUTANEOUS AS NEEDED
Status: DISCONTINUED | OUTPATIENT
Start: 2024-02-02 | End: 2024-02-02 | Stop reason: SURG

## 2024-02-02 RX ORDER — ATORVASTATIN CALCIUM 20 MG/1
20 TABLET, FILM COATED ORAL DAILY
Status: DISCONTINUED | OUTPATIENT
Start: 2024-02-03 | End: 2024-02-03

## 2024-02-02 RX ORDER — SODIUM CHLORIDE, SODIUM LACTATE, POTASSIUM CHLORIDE, CALCIUM CHLORIDE 600; 310; 30; 20 MG/100ML; MG/100ML; MG/100ML; MG/100ML
INJECTION, SOLUTION INTRAVENOUS CONTINUOUS
Status: DISCONTINUED | OUTPATIENT
Start: 2024-02-02 | End: 2024-02-03

## 2024-02-02 RX ORDER — TRANEXAMIC ACID 10 MG/ML
INJECTION, SOLUTION INTRAVENOUS AS NEEDED
Status: DISCONTINUED | OUTPATIENT
Start: 2024-02-02 | End: 2024-02-02 | Stop reason: SURG

## 2024-02-02 RX ORDER — FAMOTIDINE 10 MG/ML
20 INJECTION, SOLUTION INTRAVENOUS ONCE
Status: DISCONTINUED | OUTPATIENT
Start: 2024-02-02 | End: 2024-02-02 | Stop reason: HOSPADM

## 2024-02-02 RX ORDER — MORPHINE SULFATE 4 MG/ML
4 INJECTION, SOLUTION INTRAMUSCULAR; INTRAVENOUS EVERY 10 MIN PRN
Status: DISCONTINUED | OUTPATIENT
Start: 2024-02-02 | End: 2024-02-02 | Stop reason: HOSPADM

## 2024-02-02 RX ORDER — BISACODYL 10 MG
10 SUPPOSITORY, RECTAL RECTAL
Status: DISCONTINUED | OUTPATIENT
Start: 2024-02-02 | End: 2024-02-03

## 2024-02-02 RX ORDER — PHENYLEPHRINE HCL 10 MG/ML
VIAL (ML) INJECTION AS NEEDED
Status: DISCONTINUED | OUTPATIENT
Start: 2024-02-02 | End: 2024-02-02 | Stop reason: SURG

## 2024-02-02 RX ORDER — CEFAZOLIN SODIUM/WATER 2 G/20 ML
2 SYRINGE (ML) INTRAVENOUS ONCE
Status: DISCONTINUED | OUTPATIENT
Start: 2024-02-02 | End: 2024-02-02 | Stop reason: HOSPADM

## 2024-02-02 RX ORDER — FAMOTIDINE 20 MG/1
20 TABLET, FILM COATED ORAL 2 TIMES DAILY
Status: DISCONTINUED | OUTPATIENT
Start: 2024-02-02 | End: 2024-02-03

## 2024-02-02 RX ORDER — ACETAMINOPHEN 500 MG
1000 TABLET ORAL EVERY 6 HOURS
Status: DISCONTINUED | OUTPATIENT
Start: 2024-02-02 | End: 2024-02-03

## 2024-02-02 RX ORDER — ROCURONIUM BROMIDE 10 MG/ML
INJECTION, SOLUTION INTRAVENOUS AS NEEDED
Status: DISCONTINUED | OUTPATIENT
Start: 2024-02-02 | End: 2024-02-02 | Stop reason: SURG

## 2024-02-02 RX ORDER — METOCLOPRAMIDE HYDROCHLORIDE 5 MG/ML
10 INJECTION INTRAMUSCULAR; INTRAVENOUS EVERY 8 HOURS PRN
Status: DISCONTINUED | OUTPATIENT
Start: 2024-02-02 | End: 2024-02-03

## 2024-02-02 RX ORDER — POLYETHYLENE GLYCOL 3350 17 G/17G
17 POWDER, FOR SOLUTION ORAL DAILY PRN
Status: DISCONTINUED | OUTPATIENT
Start: 2024-02-02 | End: 2024-02-03

## 2024-02-02 RX ADMIN — PHENYLEPHRINE HCL 100 MCG: 10 MG/ML VIAL (ML) INJECTION at 13:38:00

## 2024-02-02 RX ADMIN — ROCURONIUM BROMIDE 50 MG: 10 INJECTION, SOLUTION INTRAVENOUS at 13:25:00

## 2024-02-02 RX ADMIN — EPHEDRINE SULFATE 5 MG: 50 INJECTION, SOLUTION INTRAVENOUS at 14:05:00

## 2024-02-02 RX ADMIN — EPHEDRINE SULFATE 5 MG: 50 INJECTION, SOLUTION INTRAVENOUS at 14:44:00

## 2024-02-02 RX ADMIN — GLYCOPYRROLATE 1 MG: 0.2 INJECTION, SOLUTION INTRAMUSCULAR; INTRAVENOUS at 14:49:00

## 2024-02-02 RX ADMIN — TRANEXAMIC ACID 1000 MG: 10 INJECTION, SOLUTION INTRAVENOUS at 13:36:00

## 2024-02-02 RX ADMIN — EPHEDRINE SULFATE 5 MG: 50 INJECTION, SOLUTION INTRAVENOUS at 14:24:00

## 2024-02-02 RX ADMIN — SODIUM CHLORIDE, SODIUM LACTATE, POTASSIUM CHLORIDE, CALCIUM CHLORIDE: 600; 310; 30; 20 INJECTION, SOLUTION INTRAVENOUS at 14:52:00

## 2024-02-02 RX ADMIN — PHENYLEPHRINE HCL 50 MCG: 10 MG/ML VIAL (ML) INJECTION at 14:26:00

## 2024-02-02 RX ADMIN — DEXAMETHASONE SODIUM PHOSPHATE 10 MG: 4 MG/ML VIAL (ML) INJECTION at 14:09:00

## 2024-02-02 RX ADMIN — PHENYLEPHRINE HCL 100 MCG: 10 MG/ML VIAL (ML) INJECTION at 13:35:00

## 2024-02-02 RX ADMIN — TRANEXAMIC ACID 1000 MG: 10 INJECTION, SOLUTION INTRAVENOUS at 14:36:00

## 2024-02-02 RX ADMIN — PHENYLEPHRINE HCL 50 MCG: 10 MG/ML VIAL (ML) INJECTION at 13:54:00

## 2024-02-02 RX ADMIN — EPHEDRINE SULFATE 5 MG: 50 INJECTION, SOLUTION INTRAVENOUS at 14:39:00

## 2024-02-02 RX ADMIN — SODIUM CHLORIDE, SODIUM LACTATE, POTASSIUM CHLORIDE, CALCIUM CHLORIDE: 600; 310; 30; 20 INJECTION, SOLUTION INTRAVENOUS at 14:21:00

## 2024-02-02 RX ADMIN — LIDOCAINE HYDROCHLORIDE 50 MG: 10 INJECTION, SOLUTION EPIDURAL; INFILTRATION; INTRACAUDAL; PERINEURAL at 13:24:00

## 2024-02-02 RX ADMIN — NEOSTIGMINE METHYLSULFATE 5 MG: 1 INJECTION, SOLUTION INTRAVENOUS at 14:49:00

## 2024-02-02 RX ADMIN — SODIUM CHLORIDE, SODIUM LACTATE, POTASSIUM CHLORIDE, CALCIUM CHLORIDE: 600; 310; 30; 20 INJECTION, SOLUTION INTRAVENOUS at 13:20:00

## 2024-02-02 RX ADMIN — ROCURONIUM BROMIDE 10 MG: 10 INJECTION, SOLUTION INTRAVENOUS at 14:05:00

## 2024-02-02 RX ADMIN — ONDANSETRON 4 MG: 2 INJECTION INTRAMUSCULAR; INTRAVENOUS at 14:42:00

## 2024-02-02 RX ADMIN — HYDROMORPHONE HYDROCHLORIDE 0.5 MG: 1 INJECTION, SOLUTION INTRAMUSCULAR; INTRAVENOUS; SUBCUTANEOUS at 13:49:00

## 2024-02-02 RX ADMIN — MIDAZOLAM HYDROCHLORIDE 2 MG: 1 INJECTION INTRAMUSCULAR; INTRAVENOUS at 13:21:00

## 2024-02-02 NOTE — ANESTHESIA PROCEDURE NOTES
Airway  Date/Time: 2/2/2024 1:28 PM  Urgency: Elective    Airway not difficult    General Information and Staff    Patient location during procedure: OR  Anesthesiologist: Vania Romero MD  Performed: anesthesiologist   Performed by: Vania Romero MD  Authorized by: Vania Romero MD      Indications and Patient Condition  Indications for airway management: anesthesia  Spontaneous Ventilation: absent  Sedation level: deep  Preoxygenated: yes  Patient position: sniffing  Mask difficulty assessment: 1 - vent by mask  Planned trial extubation    Final Airway Details  Final airway type: endotracheal airway      Successful airway: ETT  Cuffed: yes   Successful intubation technique: Video laryngoscopy  Facilitating devices/methods: intubating stylet  Endotracheal tube insertion site: oral  Blade: GlideScope  Blade size: #3  ETT size (mm): 7.0    Cormack-Lehane Classification: grade I - full view of glottis  Placement verified by: capnometry   Measured from: lips  ETT to lips (cm): 22  Number of attempts at approach: 1  Number of other approaches attempted: 0    Additional Comments  Intubated easily on first attempt, no dental or soft tissue damage. No signs of aspiration.   Neck kept in neutral position throughout.

## 2024-02-02 NOTE — PROGRESS NOTES
Former[facility], [facility]–1983 Pharmacy Note:  Age Based Dose Adjustment    Clyde Castro has been prescribed ketorolac (TORADOL) 30 mg IV every 6 hours.  Patient is >65 years old therefore the dose has been adjusted to 15 mg IV every 6 hours.      Thank you,  Melinda Harrington, PharmD  2/2/2024 4:18 PM

## 2024-02-02 NOTE — ANESTHESIA PREPROCEDURE EVALUATION
Anesthesia PreOp Note    HPI:     Clyde Castro is a 68 year old female who presents for preoperative consultation requested by: Behery, Omar Atef, MD    Date of Surgery: 2024    Procedure(s):  Left hip total arthroplasty anterior approach  Indication: Left Hip Osteoarthritis    Relevant Problems   No relevant active problems       NPO:  Last Liquid Consumption Date: 24  Last Liquid Consumption Time:   Last Solid Consumption Date: 24  Last Solid Consumption Time:   Last Liquid Consumption Date: 24          History Review:  Patient Active Problem List    Diagnosis Date Noted    Pre-op testing 2024     (aka Personal history of other malignant neoplasm of skin) 2014    Cherry angioma 2014    Solar lentigo 2014    Intradermal nevus 2014    Solar aging of skin 2014    Neoplasm of uncertain behavior of skin 2014    Family history of ischemic heart disease 10/20/2011       Past Medical History:   Diagnosis Date    Glaucoma screening     sees Dr Thakkar    Osteoarthrosis, unspecified whether generalized or localized, unspecified site     neck - hx C5-6 fx (on aleve)       Past Surgical History:   Procedure Laterality Date    APPENDECTOMY  1967          COLONOSCOPY      OTHER SURGICAL HISTORY      R foot neuroma removal    OTHER SURGICAL HISTORY  2004    L breast lumpectomy    SKIN SURGERY  14    MMS for BCC superfic to L superior nasal ala       Medications Prior to Admission   Medication Sig Dispense Refill Last Dose    atorvastatin 20 MG Oral Tab Take 1 tablet (20 mg total) by mouth daily.   2024    lisinopril 10 MG Oral Tab Take 1 tablet (10 mg total) by mouth daily.   2024    Naproxen Sodium (ALEVE) 220 MG Oral Tab  AS NEEDED   2024    Aspirin (ASPIRIN LOW DOSE) 81 MG Oral Tab 1 TABLET DAILY   2024    Cholecalciferol ([employer] VIT D 5000 HIGH-POTENCY) 5000 UNIT Oral Cap 1 CAPSULE DAILY   2024      Current Facility-Administered Medications Ordered in Epic   Medication Dose Route Frequency Provider Last Rate Last Admin    lactated ringers infusion   Intravenous Continuous Behery, Omar Atef, MD        acetaminophen (Tylenol Extra Strength) tab 1,000 mg  1,000 mg Oral Once Behery, Omar Atef, MD        famotidine (Pepcid) tab 20 mg  20 mg Oral Once Behery, Omar Atef, MD        Or    famotidine (Pepcid) 20 mg/2mL injection 20 mg  20 mg Intravenous Once Behery, Omar Atef, MD        metoclopramide (Reglan) tab 10 mg  10 mg Oral Once Behery, Omar Atef, MD        Or    metoclopramide (Reglan) 5 mg/mL injection 10 mg  10 mg Intravenous Once Behery, Omar Atef, MD        ceFAZolin (Ancef) 2 g in 20mL IV syringe premix  2 g Intravenous Once Refugio Beavers PA-C        povidone-iodine (Betadine) 10 % 17.5 mL in sodium chloride 0.9 % 500 mL irrigation   Irrigation Once (Intra-Op) Behery, Omar Atef, MD        clonidine-EPINEPHrine-ropivacaine-ketorolac (CERTS) (Duraclon-Adrenalin-Naropin-Toradol) pain cocktail irrigation   Intra-articular Once (Intra-Op) Behery, Omar Atef, MD         No current [facility]-ordered outpatient medications on file.       Allergies   Allergen Reactions    Apple SWELLING     Fresh apples, swelling in the mouth    Beer HIVES    Cherry SWELLING     swelling in the toungue    Iodine (Topical) HIVES    Meclizine OTHER (SEE COMMENTS)    Red Wine Extract HIVES    Shellfish HIVES    Benadryl [Diphenhydramine] OTHER (SEE COMMENTS)     Cardiac disturbances    Cat Hair Extract OTHER (SEE COMMENTS)     welts    Cyclobenzaprine OTHER (SEE COMMENTS)     Cardiac disturbances    Dander OTHER (SEE COMMENTS)     welts    Mold OTHER (SEE COMMENTS)     Coughing and sneezing    Pollen OTHER (SEE COMMENTS)     Coughing and sneezing    Sulfa Antibiotics ITCHING    Vinegar HIVES    Radiology Contrast Iodinated Dyes RASH    Seasonal Runny nose       Family History   Problem Relation Age of Onset    Heart Disorder  Father 47        CAD/MI    Other Mother         kidney stones, hypothyroidism    Diabetes Mother     Other Sister 55        CVA    Obesity Sister     Hypertension Neg     Lipids Neg     Cancer Neg      Social History     Socioeconomic History    Marital status: Single   Tobacco Use    Smoking status: Former     Packs/day: 1.00     Years: 7.00     Additional pack years: 0.00     Total pack years: 7.00     Types: Cigarettes    Smokeless tobacco: Never    Tobacco comments:     quit over 30 years ago   Vaping Use    Vaping Use: Never used   Substance and Sexual Activity    Alcohol use: Yes     Comment: 2-4 drinks per week    Drug use: Yes     Types: Cannabis     Comment: cbd cream       Available pre-op labs reviewed.             Vital Signs:  Body mass index is 34.72 kg/m².   height is 1.6 m (5' 3\") and weight is 88.9 kg (196 lb). Her oral temperature is 98 °F (36.7 °C). Her blood pressure is 126/65 and her pulse is 67. Her respiration is 16 and oxygen saturation is 97%.   Vitals:    01/30/24 1053 02/02/24 1200   BP:  126/65   Pulse:  67   Resp:  16   Temp:  98 °F (36.7 °C)   TempSrc:  Oral   SpO2:  97%   Weight: 89.4 kg (197 lb) 88.9 kg (196 lb)   Height: 1.6 m (5' 3\") 1.6 m (5' 3\")        Anesthesia Evaluation     Patient summary reviewed and Nursing notes reviewed    History of anesthetic complications (PONV)   Airway   Mallampati: II  TM distance: >3 FB  Neck ROM: limited  Dental - Dentition appears grossly intact     Pulmonary - normal exam   (+) sleep apnea on CPAP    ROS comment: + smoker x 7 years  + marijuana use  Cardiovascular - normal exam  Exercise tolerance: good  (+) hypertension    ROS comment: Echo 9/2022: LVEF 61%, mild aortic stenosis, mild TR, mild AL    Denies new SOB or chest pain    Neuro/Psych      Comments: + history of cervical fracture after accident  + history of multiple lumbar surgeries  + numbness in fingers     GI/Hepatic/Renal      Endo/Other      Comments: + history of uterine cancer  s/p hysterectomy July 2023  Abdominal   (+) obese                 Anesthesia Plan:   ASA:  3  Plan:   General  Monitors and Lines:   BIS  Airway:  ETT and Video laryngoscope  Post-op Pain Management: IV analgesics  Plan Comments: Long discussion with Clyde about GA vs spinal. She would like to avoid spinal due to previous lumbar surgeries and concern about apnea while sedated (she normally uses CPAP while sleeping at home).   Scopolamine patch ordered for PONV prophylaxis, will also give Decadron and Ondansetron.   Informed Consent Plan and Risks Discussed With:  Patient  Use of Blood Products Discussed With:  Patient  Blood Product Use Consented    Discussed plan with:  Surgeon      I have informed Clyde Castro and/or legal guardian or family member of the nature of the anesthetic plan, benefits, risks including possible dental damage if relevant, major complications, and any alternative forms of anesthetic management.   All of the patient's questions were answered to the best of my ability. The patient desires the anesthetic management as planned.  Vania Romero MD  2/2/2024 12:12 PM  Present on Admission:  **None**

## 2024-02-02 NOTE — ANESTHESIA POSTPROCEDURE EVALUATION
Patient: Clyde Castro    Procedure Summary       Date: 02/02/24 Room / Location: [facility] MAIN OR [address] [place] [address] MAIN OR    Anesthesia Start: 1319 Anesthesia Stop: 1516    Procedure: Left hip total arthroplasty anterior approach (Left: Hip) Diagnosis: (Left Hip Osteoarthritis)    Surgeons: Behery, Omar Atef, MD Anesthesiologist: Vania Romero MD    Anesthesia Type: general ASA Status: 3            Anesthesia Type: general    Vitals Value Taken Time   /72 02/02/24 1515   Temp 98.1 °F (36.7 °C) 02/02/24 1515   Pulse 79 02/02/24 1515   Resp 33 02/02/24 1515   SpO2 99 % 02/02/24 1515   Vitals shown include unfiled device data.    [facility] AN Post Evaluation:   Patient Evaluated in PACU  Patient Participation: complete - patient participated  Level of Consciousness: awake and alert  Pain Management: adequate  Airway Patency:patent  Dental exam unchanged from preop  Yes    Cardiovascular Status: acceptable and hemodynamically stable  Respiratory Status: acceptable, nonlabored ventilation, spontaneous ventilation and nasal cannula  Postoperative Hydration acceptable  Comments: Pain well controlled in PACU, breathing easily through nasal cannula.       Vania Romero MD  2/2/2024 3:16 PM

## 2024-02-02 NOTE — CM/SW NOTE
SW received MDO surgery    SW performed chart review and confirmed pt has Outpatient PT in the home arranged by Dr. Behery already set up. No further needs at this time     Per MD Note: Disposition / PT plan: OPITH     PLAN: Home with Outpatient PT in the Home arranged by Ortho MD Deidre Linder, LSW, MSW ext. [phone]

## 2024-02-02 NOTE — OPERATIVE REPORT
[facility]  OPERATIVE REPORT    DATE OF SURGERY: 2/2/2024    PREOPERATIVE DIAGNOSIS:   Left Hip Osteoarthritis    POST-OPERATIVE DIAGNOSIS:   Left Hip Osteoarthritis    PROCEDURE:  Left Total Hip Arthroplasty  Intra-operative Interpretation of Fluoroscopy    Location: [facility]    SURGEON: Omar A Behery, MD  Assistant(s): Juvenal Bernard MD, Refugio PHELAN    Anesthesia type:  General Anesthesia  Estimated Blood Loss: 300cc    Drains: None    Findings: Consistent with advanced degenerative joint disease    Specimen: Femoral head    Complications: None immediately apparent    Implants:  Acetabular Component: Depuy Emphasys size 52mm 3 hole shell, 40mm +4 AOX Polyethylene Liner, two x 6.5mm dome screws  Femoral Component: Press-fit size 7, high offset, Depuy Actis femoral component  Head: 40mm + 5 delta ceramic head.     Indication:     This is a 68 year old lady, who presented with chronic hip pain refractory to non-operative treatment, and impairing activities of daily living. Radiographic evaluation demonstrated hip osteoarthritis . Surgical versus non-surgical options were discussed with the patient, and together we decided it is best to proceed with a total hip arthroplasty with the goals of pain relief and improvement in function. All risks and benefits of surgery including bleeding, infection, dislocation, shahla-prosthetic fracture, neurovascular injury, leg length or offset discrepancy, as well as medical and anesthesia-related complications were discussed with the patient / family, who elected to proceed with surgery.     Procedure in detail:    The patient was brought to the operating room, and underwent the above anesthesia.     The patient was placed in a supine position with both feet secured in boots on the Townsend table.  The operative hip was sterilely prepped and draped in a usual orthopedic fashion.      A surgical pause was conducted to reconfirm the correct patient, site,  side, and procedure to be performed.  Perioperative antibiotics were given within one hour prior to the procedure.      An approximately 6cm long incision was made obliquely distal to the ASIS. Electrocautery was used to dissect through the subcutaneous tissue. The fascia overlying the TFL was incised in line with its fibers.  A Adhikari elevator was used to separate the fascia from the TFL.  A finger then was placed along the superior femoral neck, and a cobra retractor was placed.  A cerebellar retractor was placed distally between the rectus and the tensor muscle.     Using electrocautery and a Schnidt tonsil clamp, the ascending branches of the lateral circumflex artery were ligated.  The investing fascia over the capsule was divided, and another cobra retractor was placed inferior to the femoral neck.  The pericapsular fat was then excised from around the anterior hip capsule.     The anterior hip capsule was then incised with electrocautery starting at the edge of the acetabulum in line with the anterosuperior edge of the femoral neck and extending distally to the anterior intertrochanteric line, dividing the capsule at about a 135-degree angle. The medial and lateral capsule flaps were elevated off the of the proximal femur intertrochanteric line, and No. 5 Ethibond tagging sutures were placed in the both flaps of the capsule. A Hohmann retractor was placed posterosuperiorly over the acetabulum and the capsule was elevated a few millimeters off of the acetabular rim and ilium. The hip was externally rotated to 90 degrees, allowing an inferomedial split of the capsule. The hip was rotated back and a double pronged retractor was placed inferomedially instead of the cobra.     The superior cobra was placed inside the capsule. A char was made on the femoral neck in line with the planned femoral neck resection.  A reciprocating saw was used to complete the femoral neck osteotomy.  The hip was externally rotated to 70  degrees, and traction was applied. A spiral femoral hip screw was placed in the femoral head, and the femoral head was removed from the wound.      A No. 1 retractor over the anterior wall of the acetabulum and the No. 2 retractor was placed postero-superiorly.  This exposed the acetabulum. We then used electrocautery to excise the labrum and pulvinar. Fluoroscopy was utilized to obtain a perfect AP pelvis radiograph.  We then started reaming and sequentially expanded the socket. The last reamer was a 52-mm reamer.  Once appropriate reaming and positioning of the cup was determined with fluoroscopy, we then placed a final 52 mm acetabular shell in the appropriate degree of anteversion and abduction.  There was an excellent press fit.  Supplemental 6.5mm cancellous bone screws were drilled, measured and placed.  The final  40 mm +4 polyethylene acetabular liner was impacted into place without difficulty. The liner was checked and noted to be well seated.     Traction was released, and then we turned our attention to the femur. The femoral hook from the [place] bed was placed around the vastus ridge.  The leg was externally rotated 120 degrees. An asymmetric double pronged retractor was placed distally medially over the calcar, and the leg was then extended and adducted. A #1 retractor was placed posteriorly between the superolateral capsule and the gluteus minimus. The lateral capsule was then released off the femur, allowing for improved elevation of the femur. The conjoined was released but the obturator externus and piriformis tendons were not released. The hook was elevated, a double pronged retractor was placed over the greater trochanter and appropriate exposure of the femur was achieved.     A box osteotome was used to identify the starting location. A rongeur was used to remove posterolateral cortical bone to allow neutral broaching.  The femoral canal was then broached sequentially to a size 7 stem.  The broach  was left in place, and a high offset neck with a 40-mm +5 trial head were placed.  The leg was abducted and raised, and the hip was then reduced. Fluoroscopic imaging demonstrated appropriate leg length and offset.  The hip was found to have appropriate soft tissue tension.  Hip range of motion was tested and noted to be stable throughout.    We then elected to implant these components.  The hip was re-dislocated.  The trial components were removed. The femur was exposed again, and the final size 7, high offset stem was impacted into place. The calcar was checked and noted to be intact without any evidence of fracture. A 40 mm + 5 femoral head was retrialed with appropriate soft tissue tension.  The final 40 mm + 5  femoral head was then impacted onto a cleaned/dried trunnion.  The hip was then reduced.     The wound was thoroughly irrigated with dilute betadine solution followed by normal saline. Pain cocktail injection was delivered to the soft tissues. The capsule was closed with No. 1 Vicryl interrupted sutures. The fascia over the TFL was closed with a running Quill suture.  The subcutaneous tissues were then closed with 2-0 vicryl.  The skin was closed with a running 3-0 monocryl suture.  Dermabond was applied to the skin edges and a dry sterile dressing was placed.  The patient was awakened from anesthesia and taken to the PACU in stable condition.  There were no immediate complications.     POST-OPERATIVE PLAN  The patient will be permitted weight bearing as tolerated on the operative extremity  The patient will be permitted range of motion as tolerated  The patient will receive 24 hours of perioperative antibiotics.  Venous thromboembolic prophylaxis will consist of early mobilization, mechanical compressive devices, and ASA.    The dressing may be removed at 12 days post-operatively  The patient should be scheduled for follow up in 2 weeks for wound and radiographic evaluation.

## 2024-02-02 NOTE — CONSULTS
[facility]    PATIENT'S NAME: KAREN ROWE   ATTENDING PHYSICIAN: Omar Behery, MD   CONSULTING PHYSICIAN: Dianna Klein MD   PATIENT ACCOUNT#:   [id number]    LOCATION:  1E Room 1 A [facility]  MEDICAL RECORD #:   [id number]       YOB: 1955  ADMISSION DATE:       2024      CONSULT DATE:  2024    REPORT OF CONSULTATION      REASON FOR CONSULTATION:  Left total hip arthroplasty.    HISTORY OF PRESENT ILLNESS:  The patient is a 68-year-old  female with chronic left hip pain and underlying severe end-stage primary osteoarthritis, failed outpatient conservative medical management options.  Scheduled today for left total hip arthroplasty by her orthopedic surgeon, Dr. Omar Behery.  Postoperatively, patient was transferred to PACU for further monitoring.    PAST MEDICAL HISTORY:  Generalized osteoarthritis, hyperlipidemia, hypertension.    PAST SURGICAL HISTORY:  , appendectomy, right foot neuroma resection, left breast lumpectomy, nasal basal cell carcinoma resection, Mohs procedure.    MEDICATIONS:  Please see medication reconciliation list.      ALLERGIES:  She is allergic to sulfa and radiology contrast.  Otherwise, she had multiple environmental and food allergies.    SOCIAL HISTORY:  Ex-tobacco user.  Social alcohol.  No drug use.  Lives with her family.  Independent for basic activities of daily living.     FAMILY HISTORY:  Father had coronary artery disease.      REVIEW OF SYSTEMS:  Currently resting in bed.  Left hip discomfort.  No chest pain.  No shortness of breath.  Other 12-point review of systems is negative.      PHYSICAL EXAMINATION:    GENERAL:  Alert and oriented to time, place, and person.  No acute distress.  VITAL SIGNS:  Temperature 99.1, pulse 62, respiratory rate 16, blood pressure 111/83, pulse ox 100% on room air.    HEENT:  Atraumatic.  Oropharynx clear.  Moist mucous membranes.  Ears, nose normal.  Eyes:  Anicteric sclerae.    NECK:  Supple.   No lymphadenopathy.  Trachea midline.  Full range of motion.  LUNGS:  Clear to auscultation bilaterally.  Normal respiratory effort.    HEART:  Regular rate, rhythm.  S1 and S2 auscultated.  No murmur.    ABDOMEN:  Soft, nondistended.  No tenderness.  Positive bowel sounds.    EXTREMITIES:  No peripheral edema, clubbing, or cyanosis.  Left hip dressing.  NEUROLOGIC:  Motor and sensory intact.      ASSESSMENT AND PLAN:    1.   Left hip primary osteoarthritis, status post left total hip arthroplasty, general anesthesia.  Pain control.  Neurovascular checks.  Aspirin for DVT prophylaxis.  Physical and occupational therapy.    2.   Essential hypertension.  Continue home medications and monitor.  3.   Hyperlipidemia.  Continue home medications.    Dictated By Dianna Klein MD  d: 02/02/2024 16:25:11  t: 02/02/2024 16:49:04  Job [id number]  FB/    cc: Omar Behery, MD

## 2024-02-02 NOTE — H&P
History and Physical     Clyde Castro Patient Status:  Outpatient in a Bed    1955 MRN [id number]   Location [facility] OPERATING ROOM Attending Behery, Omar Atef, MD   Hosp Day # 0 PCP WILLIAM HULESCH     Chief Complaint: left hip pain    Subjective:    Clyde Castro is a 68 year old female with left hip pain.    his is a pleasant 69yo lady who is presenting for evaluation of left hip pain.     Patient has a history of chronic persistent left hip pain. This is not related to injury. The pain started several years ago. Pain is located in the posterior hip and groin, Visual analogue scale of 9/10, dull/aching and radiates to the knee. This hip pain is associated with weight bearing activity.  The patient as tried non-operative treatment with aleve, tylenol, PT.    Patient endorses intermittent associated numbness/tingling and pain in the left lower extremity.    Patient can currently only walk indoors only, and activities of daily living are limited by significant left hip pain.    PMH: uterine cancer (in remission s/p lap hysterectomy, no radiation), CAD, aortic stenosis (denies SOB and chest pain), BRYANNA on CPAP.  PSH: hysterectomy in . Right NINOSKA in 2018 by outside surgeon - no issues  Meds: denies chemotherapeutic drugs.    Patient is retired. Lives alone, but has a friend nearby. Former smoker.  Goals of treatment: pain relief.      History/Other:      Past Medical History:  Past Medical History:   Diagnosis Date    Glaucoma screening     sees Dr Thakkar    Osteoarthrosis, unspecified whether generalized or localized, unspecified site     neck - hx C5-6 fx (on aleve)        Past Surgical History:   Past Surgical History:   Procedure Laterality Date    APPENDECTOMY  1967          COLONOSCOPY      OTHER SURGICAL HISTORY      R foot neuroma removal    OTHER SURGICAL HISTORY  2004    L breast lumpectomy    SKIN SURGERY  14    MMS for BCC superfic to L superior nasal  ala       Social History:  reports that she has quit smoking. Her smoking use included cigarettes. She has a 7 pack-year smoking history. She has never used smokeless tobacco. She reports current alcohol use. She reports current drug use. Drug: Cannabis.    Family History:   Family History   Problem Relation Age of Onset    Heart Disorder Father 47        CAD/MI    Other Mother         kidney stones, hypothyroidism    Diabetes Mother     Other Sister 55        CVA    Obesity Sister     Hypertension Neg     Lipids Neg     Cancer Neg        Allergies:   Allergies   Allergen Reactions    Apple SWELLING     Fresh apples, swelling in the mouth    Beer HIVES    Cherry SWELLING     swelling in the toungue    Iodine (Topical) HIVES    Meclizine OTHER (SEE COMMENTS)    Red Wine Extract HIVES    Shellfish HIVES    Benadryl [Diphenhydramine] OTHER (SEE COMMENTS)     Cardiac disturbances    Cat Hair Extract OTHER (SEE COMMENTS)     welts    Cyclobenzaprine OTHER (SEE COMMENTS)     Cardiac disturbances    Dander OTHER (SEE COMMENTS)     welts    Mold OTHER (SEE COMMENTS)     Coughing and sneezing    Pollen OTHER (SEE COMMENTS)     Coughing and sneezing    Sulfa Antibiotics ITCHING    Vinegar HIVES    Radiology Contrast Iodinated Dyes RASH    Seasonal Runny nose       Medications:    No current facility-administered medications on file prior to encounter.     Current Outpatient Medications on File Prior to Encounter   Medication Sig Dispense Refill    atorvastatin 20 MG Oral Tab Take 1 tablet (20 mg total) by mouth daily.      lisinopril 10 MG Oral Tab Take 1 tablet (10 mg total) by mouth daily.      Naproxen Sodium (ALEVE) 220 MG Oral Tab  AS NEEDED      Aspirin (ASPIRIN LOW DOSE) 81 MG Oral Tab 1 TABLET DAILY      Cholecalciferol ([employer] VIT D 5000 HIGH-POTENCY) 5000 UNIT Oral Cap 1 CAPSULE DAILY         Review of Systems:   Pertinent positives and negatives noted in the HPI.    Objective:     Ht 5' 3\" (1.6 m)   Wt 197 lb (89.4  kg)   BMI 34.90 kg/m²   Constitutional: The patient is alert and awake in no apparent distress.    Skin: There are no prior incisions, scars, or lesions over the left hip  Musculoskeletal:   Gait: The patient ambulates with angaltic gait with pain in the left hip.  Leg lengths are clinically equal in a supine position.   There is moderate tenderness to palpation over the greater trochanter.   Passive range of motion testing revealed 0? extension, 120? flexion, 10? internal rotation, 45? external rotation, 30? abduction, and 10? adduction.   The patient has pain with Stinchfield’s test, and FADIR of the left hip.   Ipsilateral straight leg raise test is negative for radiculopathy.   Ipsilateral knee passive ROM is painless, no significant joint line tenderness to palpation.   Neuro: The patient has 5/5 strength in the left hip flexors, hip extensors, quadriceps, hamstrings, tibialis anterior, gastrocnemius-soleus complex, EHL, and FHL.   Abductor Strength is 5/5  Sensation is intact to light touch in the left superficial peroneal, deep peroneal, saphenous, sural, and tibial nerve distributions.   Vascular exam: Toes are warm and well perfused. DP pulse is palpable.       Results:    Labs:    Selected labs - last 24 hours:  Endo  Lytes  Renal   Glu -  Na - Ca -  BUN -   POC Gluc  -  K - PO4 -  Cr -   A1c -  Cl - Mg -  eGFR -   TSH -  CO2 -         LFT  CBC  Other   AST -  WBC -  PTT - Procal -   ALT -  Hb -  INR - CRP -   APk -  Hct -  Trop - D dim -   T maximus -  PLT -  pBNP -  BNP -  - Ferritin  -   Prot -    CK  - Lactate  -   Alb -    LDL  - COVID  -       Imaging: Imaging data reviewed in Epic.    Assessment & Plan:      Assessment:   This is a 67yo lady presenting for evaluation of left hip pain. The patient's history, physical exam, and imaging are consistent with advanced left hip osteoarthritis. This is significantly limiting the patient's function and is refractory to non-operative treatment. The patient is  indicated for total hip arthroplasty.      Plan:  We discussed the surgical procedure, approach, implant selection, risks and benefits, and shahla-operative course including discharge planning. We also discussed what is involved with surgery including potential risks and complications. The patient understood that the procedure may not fully alleviate the pain or return range of motion. The patient understood that there are risks associated with the surgery including but not limited to, problems with anesthesia (death), as well as surgical complications of thigh numbness, infection, instability (dislocation of the hip), fractures of the bones (femur or acetabulum), loosening or failure of implants, hematoma (blood accumulation in/near the hip) which may require surgical drainage, blood clots, pulmonary embolism, and blood vessel or nerve injury.    We will consider surgery pending review of clearance.      The patient again states that they are markedly debilitated by this pain. The patient feels that they have failed non operative measures and wishes to proceed with surgery. The patient verbalized understanding of the information presented today and endorsed a willingness to comply with instructions. All the patient's questions were answered to the patient's satisfaction.      Refugio Beavers PA-C  2/1/2024    Supplementary Documentation:

## 2024-02-02 NOTE — DISCHARGE INSTRUCTIONS
DISCHARGE INSTRUCTIONS:  PLACE ICE TO SURGICAL AREA 20-30 MINUTES ON/ 20-30 MINUTES OFF. THIS IS TO HELP WITH PAIN & SWELLING.    TAKE YOUR MEDICATIONS AS PRESCRIBED BY YOUR DOCTOR BELOW.THESE HAVE BEEN SENT TO YOUR PHARMACY.    IF YOU WERE INSTRUCTED BY PHYSICAL THERAPY TO EXERCISE HIP PRECAUTIONS, CONTINUE TO DO SO AFTER DISCHARGE    IT IS OK TO BEAR WEIGHT AS TOLERATED ON THE OPERATIVE EXTREMITY.     CALL TO CONFIRM YOUR FOLLOW UP APPOINTMENT WITH DR. BEHERY TO BE SEEN IN 2-3 WEEKS POSTOP. [phone]    MEDICATIONS    POST OP MEDICATION REGIMEN              MULTI-MODAL   PAIN REGIMEN       DRUG   FOR   FREQUENCY & DURATION   QTY   NOTES     WEANING  TIPS       Gabapentin 100mg   Nerve pain   Take 2 tabs every 8 hours for 14 days    90   No refills You may discontinue this medication prior to 14 days if you do not tolerate it.        Tylenol 500mg     Mild pain   Take 2 tabs every 6 hours     90   Can purchase over-the-counter    Stop using medication if no longer having pain.      Tramadol 50mg     Moderate pain   Take 1-2 tabs every 6 hours only as needed   45 May prescribe a refill, but ideally, this should be tapered off after surgery Stop using this medication 2nd   As pain decreases over time, increase the interval between doses (6 hours to 8, then 12, then 24) to taper off the medication.      Meloxicam 15mg     Inflammation   Take 1 tab daily for 30 days     30   May refill if needed beyond 30 days   Recommend completing the 30 day supply.           BREAKTHROUGH PAIN         Oxycodone 5mg       Severe pain     Take 1-2 tabs every 4-6 hours only as needed for severe pain       40   Take at least 1 hr apart from Tramadol.    Ideally, no refill. The goal is to taper off this medication as soon as possible, as it can be addictive and have side effects.    Stop using this medication 1st if no longer having severe pain.         BLOOD THINNER     Aspirin 81mg   Blood clot prevention   Take 1 tab twice daily for  30 days     60     No refills   Complete the entire course of your blood thinner.                      STOOL SOFTENER     Sennokot 8.6/50mg   Constipation   Take 1 tab twice daily  while on opioids     60 Refer to discharge instructions if constipation persists even after taking this medication   Stop taking if having diarrhea or loose stools.           ANTI-NAUSEA   Ondansetron 4mg   Nausea   Take 1 tab every 8 hours as needed if nauseous     20   No Refill      ANTI-ACID REFLUX / GASTRITIS   Pantoprazole 40mg   Acid reflux, gastric ulcer prophylaxis   Take 1 tab every day along with Meloxicam     60   May refill if Meloxicam refilled        DRESSING:    YOU HAVE A SPECIAL DRESSING CALLED AN AQUACEL DRESSING OVER YOUR INCISION.    THE DRESSING STAYS FOR 12 DAYS FROM SURGERY.    YOU MAY SHOWER AS SOON AS YOU RETURN HOME WITH THE AQUACEL DRESSING INTACT OVER YOUR INCISION AREA.    IF THE DRESSING LEAKS OR COMES LOOSE BEFORE THE 7 DAY PERIOD CONTACT YOUR DOCTOR / SURGEON.    AFTER   12 DAYS THE DRESSING IS REMOVED BY PULLING ON THE EDGE OF THE DRESSING AND GENTLY STRETCHING IT, THEN PEEL IT OFF SLOWLY LIKE A BANDAID. IF YOU HAVE ANY QUESTIONS OR CONCERNS ABOUT THE DRESSING CONTACT YOUR DOCTOR.    IF DRAINAGE IS PRESENT AT ANYTIME FROM YOUR DRESSING OR FROM YOUR INCISION CALL YOUR DOCTOR / SURGEON AS SOON AS POSSIBLE.      REASONS TO CALL YOUR DOCTOR:  TEMPERATURE .0 OR MORE  PERSISTANT NAUSEA OR VOMITTING - ESPECIALLY IF YOU ARE UNABLE TO EAT OR DRINK.  INCREASED LEVEL OF PAIN OR PAIN WHICH IS NOT CONTROLLED BY YOUR PAIN MEDICINE.  PERSISTENT DRAINAGE AT ANYTIME FROM YOUR SURGICAL AREA / INCISION.  PAIN, TENDERNESS OR SUDDEN SWELLING IN YOUR CALF REGION OF THE LOWER EXTREMITIES - THIS IS A POSSIBLE SIGN OF A BLOOD CLOT.  ANY CHANGES IN SENSATION IN YOUR BODY IN THE AREA OF YOUR SURGERY = NUMBNESS OR TINGLING.  ANY CHANGES IN CIRCULATION IN YOUR BODY IN THE AREA OF YOUR SURGERY = CHANGES  IN COLOR EITHER DARKER  OR VERY PALE; OR  IF AREA FEELS COLD TO THE TOUCH AS COMPARED TO OTHER AREAS.  ANY CHANGES IN FUNCTION IN YOUR BODY IN THE AREA OF YOUR SURGERY = CHANGE IN MOVEMENT - UNABLE TO MOVE OR WIGGLE FINGERS, TOES OR FOOT OR ANY OTHER CHANGES IN NORMAL BODY FUNCTIONING.  FOR ANY OF THE ABOVE OR ANY OTHER QUESTIONS OR CONCERNS CALL YOUR DOCTOR/ SURGEON AS SOON AS POSSIBLE.      Omar Behery, MD MPH  Orthopaedic Surgeon, Adult Hip and Knee Reconstruction  [facility] at [address], [address] [address]  Office: (P) [phone] (F) [phone]  Adminstrative Assistant: Irena Salas

## 2024-02-03 VITALS
SYSTOLIC BLOOD PRESSURE: 93 MMHG | HEIGHT: 63 IN | BODY MASS INDEX: 34.73 KG/M2 | WEIGHT: 196 LBS | RESPIRATION RATE: 18 BRPM | OXYGEN SATURATION: 100 % | HEART RATE: 76 BPM | TEMPERATURE: 98 F | DIASTOLIC BLOOD PRESSURE: 72 MMHG

## 2024-02-03 PROCEDURE — 97530 THERAPEUTIC ACTIVITIES: CPT

## 2024-02-03 PROCEDURE — 97110 THERAPEUTIC EXERCISES: CPT

## 2024-02-03 PROCEDURE — 97165 OT EVAL LOW COMPLEX 30 MIN: CPT

## 2024-02-03 PROCEDURE — 97535 SELF CARE MNGMENT TRAINING: CPT

## 2024-02-03 PROCEDURE — 97116 GAIT TRAINING THERAPY: CPT

## 2024-02-03 RX ORDER — ASPIRIN 81 MG/1
81 TABLET ORAL 2 TIMES DAILY
Status: SHIPPED | COMMUNITY
Start: 2024-02-03

## 2024-02-03 NOTE — PROGRESS NOTES
[facility] [place]     ORTHO DAILY PROGRESS NOTE    Patient: Clyde Castro      Subjective:    Nothing acute overnight.  Pain in the operative extremity is well controlled.    Patient denies new onset numbness/tingling in the operative extremity.    Patient also denies chest pain / shortness of breath, nausea/vomiting.          Objective:      Vitals:    02/03/24 0811   BP: 93/72   Pulse: 76   Resp: 18   Temp:        I/O last 3 completed shifts:  In: 1150 [I.V.:950; IV PIGGYBACK:200]  Out: 1100 [Urine:800; Blood:300]     Gen: awake, alert, not in acute distress    Abdomen nondistended, non-tender      Left Lower Extremity:   Dressing clean, dry, intact.    Sensation intact to light touch in Sural/Saphenous/Tibial/Superficial Peroneal/Deep Peroneal and Tibial distributions.   Motor exam : 5/5 EHL/FHL/TA/GSC.  Vascular exam: Palpable dorsalis pedis pulses. Cap refill ~2s in the toes. Foot warm and well perfused      Labs:    No results found for: \"HGB\"    No results found for: \"INR\", \"PROTIME\"        ASSESSMENT/PLAN: 68 year old yo female s/p left total hip arthroplasty on  2/2/2024     - Weight bearing status: WBAT LLE  - no hip precautions  - Mechanical DVT prophylaxis and chemoprophylaxis with ASA 81 BID   - 24 hours of perioperative antibiotics  - PT for mobilization and motion  - Advance diet as tolerated. Discontinue IV fluids POD1 if tolerating diet.   - Multimodal pain control regimen ordered  - Disposition: Pending PT Evaluation. Home with outpatient PT at home    Omar Behery, MD  [facility] [place]

## 2024-02-03 NOTE — PHYSICAL THERAPY NOTE
PHYSICAL THERAPY HIP EVALUATION - INPATIENT     Room Number: Room 1/Room 1-A  Evaluation Date: 2/2/2024  Type of Evaluation: Initial  Physician Order: PT Eval and Treat    Presenting Problem: s/p left anterior NINOKSA 2/3/24     Reason for Therapy: Mobility Dysfunction and Discharge Planning    PHYSICAL THERAPY ASSESSMENT     Patient is a 68 year old female admitted 2/2/2024 for left anterior NINOSKA, no hip precautions per Dr Behery.  Patient's current functional deficits include impaired bed mobility, transfers, ambulation and stair negotiation, which are below the patient's pre-admission status.  Patient will benefit from continued inpatient physical therapy to address above issues so that patient may achieve highest functional mobility level.    Pt ok to see per rn.  Pt recd in supine, family present.  Pt educated in role of PT, ankle pumps for DVT prevention, goals for session.  Pt able to perform ankle pumps, reports return of sensation left LE, able to perform left quad set and SAQ with good quality. Patient instructed in and performed therex per NINOSKA protocol, tolerated well.     Pt transferred supine to sit with min a for left LE only, good sitting balance, no dizziness.  Pt provided verbal instruction and demonstration of rw use, stood to rw with min a.  Pt able to perform left tke, marching in place with no left knee buckling, instructed in gait sequencing.  Pt amb with rw with gait training emphasis on heel toe pattern, upright posture. Pt tolerated well with no LOB, able to advance to step through gait pattern.    Pt returned to bs chair,  Pt and family educated about POC., discussed dc recommendations.  Discussed session with Rn and pct, assist recommendations.    THERAPEUTIC EXERCISES  Lower Extremity Ankle pumps  Hip AB/AD  LAQ  Quad sets     Position Sitting and Supine           The patient's Approx Degree of Impairment: 46.58% has been calculated based on documentation in the [facility] '6 clicks' Inpatient  Basic Mobility Short Form.  Research supports that patients with this level of impairment may benefit from home with home PT.    Patient will benefit from continued IP PT services to address these deficits in preparation for discharge.    DISCHARGE RECOMMENDATIONS  PT Discharge Recommendations: Home with home health PT    PLAN  PT Treatment Plan: Energy conservation;Patient education;Gait training;Range of motion;Strengthening;Stair training  Rehab Potential : Good  Frequency (Obs): Daily       PHYSICAL THERAPY MEDICAL/SOCIAL HISTORY        Problem List  Principal Problem:    Primary osteoarthritis of left hip  Active Problems:    Essential hypertension    Hyperlipidemia      HOME SITUATION  Home Situation  Type of Home: House  Home Layout: One level (flight to basement for laundry)  Stairs to Enter : 2  Stairs to Bedroom: 0  Lives With: Significant other  Drives: Yes  Patient Owned Equipment: Rolling walker;Other (Comment) (walking stick which pt used pta)  Patient Regularly Uses: Glasses     Prior Level of [place]: Pt reports ind pta, ambulating with a walking stick per pt.  Pt lives with SO who will be able to assist as needed upon edw dc.        SUBJECTIVE  \"I have a spinal cord injury so I will always need to walk with my walking stick\"    PHYSICAL THERAPY EXAMINATION     OBJECTIVE  Precautions:  (ROM as tolerated, no hip precautions)  Fall Risk: Standard fall risk    WEIGHT BEARING RESTRICTION  Weight Bearing Restriction: L lower extremity           L Lower Extremity: Weight Bearing as Tolerated    PAIN ASSESSMENT  Ratin  Location: left hip  Management Techniques: Activity promotion    COGNITION  Overall Cognitive Status:  WFL - within functional limits    RANGE OF MOTION AND STRENGTH ASSESSMENT  Upper extremity ROM and strength are within functional limits   Lower extremity ROM is within functional limits except left hip  Lower extremity strength is within functional limits     BALANCE  Static  Sitting: Good  Dynamic Sitting: Good  Static Standing: Poor +  Dynamic Standing: Poor +      O2 WALK  Oxygen Therapy  SPO2% on Room Air at Rest: 96    AM-PAC '6-Clicks' INPATIENT SHORT FORM - BASIC MOBILITY  How much difficulty does the patient currently have...  Patient Difficulty: Turning over in bed (including adjusting bedclothes, sheets and blankets)?: A Little   Patient Difficulty: Sitting down on and standing up from a chair with arms (e.g., wheelchair, bedside commode, etc.): A Little   Patient Difficulty: Moving from lying on back to sitting on the side of the bed?: A Little   How much help from another person does the patient currently need...   Help from Another: Moving to and from a bed to a chair (including a wheelchair)?: A Little   Help from Another: Need to walk in hospital room?: A Little   Help from Another: Climbing 3-5 steps with a railing?: A Little     AM-PAC Score:  Raw Score: 18   Approx Degree of Impairment: 46.58%   Standardized Score (AM-PAC Scale): 43.63   CMS Modifier (G-Code): CK    FUNCTIONAL ABILITY STATUS  Functional Mobility/Gait Assessment  Distance (ft): 100  Assistive Device: Rolling walker  Pattern: L Decreased stance time    Exercise/Education Provided:  Bed mobility  Energy conservation  Functional activity tolerated  Gait training  Posture  ROM  Strengthening  Transfer training    Patient End of Session: Up in chair;Needs met;Call light within reach;RN aware of session/findings;All patient questions and concerns addressed;Ice applied;Family present    CURRENT GOALS  Goals to be met by: 2/3/24  Patient Goal Patient's self-stated goal is: to go home with home PT   Goal #1 Patient is able to demonstrate supine - sit EOB @ level: modified independent   Goal #1   Current Status    Goal #2 Patient is able to demonstrate transfers Sit to/from Stand at assistance level: supervision     Goal #2  Current Status    Goal #3 Patient is able to ambulate 150 feet with assistive device at  assistance level: supervision   Goal #3   Current Status    Goal #4 Patient will negotiate 2 stairs/one curb w/ assistive device and supervision   Goal #4   Current Status    Goal #5 Patient verbalizes and/or demonstrates all precautions and safety concerns independently   Goal #5   Current Status    Goal #6 Patient independently performs home exercise program for ROM/strengthening per the instructions provided in preparation for discharge.   Goal #6  Current Status        Patient Evaluation Complexity Level:  History Low - no personal factors and/or co-morbidities   Examination of body systems Low - addressing 1-2 elements   Clinical Presentation Low - Stable   Clinical Decision Making Low Complexity     Gait Training: 15 minutes  Therapeutic Activity: 15 minutes

## 2024-02-03 NOTE — PHYSICAL THERAPY NOTE
PHYSICAL THERAPY HIP TREATMENT NOTE - INPATIENT    Room Number: Room 1/Room 1-A            Presenting Problem: s/p left anterior NINOSKA 2/3/24       Problem List  Principal Problem:    Primary osteoarthritis of left hip  Active Problems:    Essential hypertension    Hyperlipidemia      PHYSICAL THERAPY ASSESSMENT     RN approved PT session and pt willing to work with therapist. Pt was using toilet, working with OT and taking over by therapist. Reviewed anterior hip precautions and pt able to recall them and maintain with mobility. Pt amb outside of room with RW, occ cues for maintain upright posture and increased step with L LE. Steps negotiation with CGA, 4 steps up/down with 1 HR CGA. Pt amb back to her room and practice bed mobility with going to bed with LLE. Pt needs cues for correct transfers to lift b le's at some time. Discussed needs after discharged with promote safety and mobility. Pt verbalized understanding. Pt position in chair with all needs in each. Pt with good carryover and progress towards set goals. Rn aware of pt progress.   The patient's Approx Degree of Impairment: 41.77% has been calculated based on documentation in the [facility] '6 clicks' Inpatient Basic Mobility Short Form.  Research supports that patients with this level of impairment may benefit from home with  PT.    DISCHARGE RECOMMENDATIONS  PT Discharge Recommendations: Home with home health PT    PLAN  PT Treatment Plan: Bed mobility;Body mechanics;Patient education;Gait training;Strengthening;Stair training;Transfer training;Balance training  Frequency (Obs): Daily    SUBJECTIVE  I amb busy person.    OBJECTIVE  Precautions: Limb alert - left (ROM as tolerated)    WEIGHT BEARING RESTRICTION           L Lower Extremity: Weight Bearing as Tolerated    PAIN ASSESSMENT   Rating: 3  Location: L hip  Management Techniques: Activity promotion;Body mechanics;Relaxation;Repositioning    BALANCE  Static Sitting: Good  Dynamic Sitting:  Good  Static Standing: Fair +  Dynamic Standing: Fair  ACTIVITY TOLERANCE                         O2 WALK       AM-PAC '6-Clicks' INPATIENT SHORT FORM - BASIC MOBILITY  How much difficulty does the patient currently have...  Patient Difficulty: Turning over in bed (including adjusting bedclothes, sheets and blankets)?: A Little   Patient Difficulty: Sitting down on and standing up from a chair with arms (e.g., wheelchair, bedside commode, etc.): A Little   Patient Difficulty: Moving from lying on back to sitting on the side of the bed?: A Little   How much help from another person does the patient currently need...   Help from Another: Moving to and from a bed to a chair (including a wheelchair)?: A Little   Help from Another: Need to walk in hospital room?: None   Help from Another: Climbing 3-5 steps with a railing?: A Little     AM-PAC Score:  Raw Score: 19   Approx Degree of Impairment: 41.77%   Standardized Score (AM-PAC Scale): 45.44   CMS Modifier (G-Code): CK    FUNCTIONAL ABILITY STATUS  Functional Mobility/Gait Assessment  Gait Assistance:  (SBA)  Distance (ft): 100 ft x2  Assistive Device: Rolling walker  Pattern: L Decreased stance time  Stairs: Stairs  How Many Stairs: 4  Device: 1 Rail  Assist: Contact guard assist  Pattern: Ascend and Descend  Ascend and Descend : Side step    Additional Information:     Exercises AM Session    Ankle Pumps     10 reps    Quad Sets 10 reps    Glut Sets 10 reps    Hip Abd/Add 10 reps    Heel slides 10 reps    Saq 0 reps    Sitting Knee Extension 10 reps    Standing heel/toe raises 0 reps    Hamstring Curls 0 reps    Forward, back steps 0 reps    Short Squats 0 reps      Patient End of Session: Up in chair;Needs met;Call light within reach;RN aware of session/findings;Bracing education provided per handout;All patient questions and concerns addressed    CURRENT GOALS     Patient Goal Patient's self-stated goal is: to go home with home PT   Goal #1 Patient is able to  demonstrate supine - sit EOB @ level: modified independent   Goal #1   Current Status SBA   Goal #2 Patient is able to demonstrate transfers Sit to/from Stand at assistance level: supervision     Goal #2  Current Status SBA with RW    Goal #3 Patient is able to ambulate 150 feet with assistive device at assistance level: supervision   Goal #3   Current Status Pt amb 100 ft x2 with SBA   Goal #4 Patient will negotiate 2 stairs/one curb w/ assistive device and supervision   Goal #4   Current Status 4 steps up/down with 1 HR CGA   Goal #5 Patient verbalizes and/or demonstrates all precautions and safety concerns independently   Goal #5   Current Status In progress   Goal #6 Patient independently performs home exercise program for ROM/strengthening per the instructions provided in preparation for discharge.   Goal #6  Current Status In progress       Gait Training: 15 minutes  Therapeutic Activity: 15 minutes

## 2024-02-03 NOTE — DISCHARGE SUMMARY
[facility]     Discharge Summary    Clyde Castro Patient Status:  Outpatient in a Bed    1955 MRN [id number]   Location [facility] Attending Myra Nieto MD   Hosp Day # 0 PCP WILLIAM HULESCH     Date of Admission: 2024    Date of Discharge: 2024.    Admitting Diagnosis: Left Hip Osteoarthritis    Discharge Diagnosis:   Patient Active Problem List   Diagnosis    Family history of ischemic heart disease    Cherry angioma    Solar lentigo    Intradermal nevus    Solar aging of skin    Neoplasm of uncertain behavior of skin     (aka Personal history of other malignant neoplasm of skin)    Pre-op testing    Primary osteoarthritis of left hip    Essential hypertension    Hyperlipidemia       Reason for Admission:     Primary osteoarthritis of Left hip    Physical Exam:     GENERAL:  Alert and oriented to time, place, and person.  No acute distress.  HEENT:  Atraumatic.  Oropharynx clear.  Moist mucous membranes.  Ears, nose normal.  Eyes:  Anicteric sclerae.    NECK:  Supple.  No lymphadenopathy.  Trachea midline.  Full range of motion.  LUNGS:  Clear to auscultation bilaterally.  Normal respiratory effort.    HEART:  Regular rate, rhythm.  S1 and S2 auscultated.  No murmur.    ABDOMEN:  Soft, nondistended.  No tenderness.  Positive bowel sounds.    EXTREMITIES:  No peripheral edema, clubbing, or cyanosis.  Left hip dressing.  NEUROLOGIC:  Motor and sensory intact.     Hospital Course:     Left hip primary osteoarthritis, status post left total hip arthroplasty, general anesthesia.  Pain control.  Neurovascular checks.  Aspirin for DVT prophylaxis.  Physical and occupational therapy.     Essential hypertension.  Continue home medications and monitor.   Hyperlipidemia.  Continue home medications.    History of Present Illness:     Per Dr. Klein  The patient is a 68-year-old  female with chronic left hip pain and underlying severe end-stage primary  osteoarthritis, failed outpatient conservative medical management options. Scheduled today for left total hip arthroplasty by her orthopedic surgeon, Dr. Omar Behery. Postoperatively, patient was transferred to PACU for further monitoring.     Disposition: Final discharge disposition not confirmed    Discharge Condition: Stable    Discharge Medications:   Current Discharge Medication List        START taking these medications    Details   aspirin 81 MG Oral Tab EC Take 1 tablet (81 mg total) by mouth in the morning and 1 tablet (81 mg total) before bedtime.           CONTINUE these medications which have NOT CHANGED    Details   atorvastatin 20 MG Oral Tab Take 1 tablet (20 mg total) by mouth nightly.      lisinopril 10 MG Oral Tab Take 1 tablet (10 mg total) by mouth daily.      Naproxen Sodium (ALEVE) 220 MG Oral Tab  AS NEEDED      Cholecalciferol ([employer] VIT D 5000 HIGH-POTENCY) 5000 UNIT Oral Cap 1 CAPSULE DAILY           STOP taking these medications       Aspirin (ASPIRIN LOW DOSE) 81 MG Oral Tab            Total DC time > 30 min    Myra Nieto MD  2/3/2024  11:36 AM     Hospital Discharge Diagnoses:  Primary ostoarthritis of Left hip    Lace+ Score: 16  59-90 High Risk  29-58 Medium Risk  0-28   Low Risk.    TCM Follow-Up Recommendation:  LACE < 29: Low Risk of readmission after discharge from the hospital; Still recommend for TCM follow-up.

## 2024-02-03 NOTE — OCCUPATIONAL THERAPY NOTE
OCCUPATIONAL THERAPY EVALUATION - INPATIENT     Room Number: Room 1/Room 1-A  Evaluation Date: 2/3/2024  Type of Evaluation: Initial  Presenting Problem: L NINOSKA    Physician Order: IP Consult to Occupational Therapy  Reason for Therapy: ADL/IADL Dysfunction and Discharge Planning    OCCUPATIONAL THERAPY ASSESSMENT   Patient is a 68 year old female admitted 2/2/2024 for L NINOSKA, anterior approach, no precautions ordered; WBAT. The patient's Approx Degree of Impairment: 46.65% has been calculated based on documentation in the [facility] '6 clicks' Inpatient Daily Activity Short Form.  Research supports that patients with this level of impairment may benefit from return home with support and HH services; no further OT needs at the acute care level; pt is on track to d/c home; pt left with PT at end of session.    Skilled Therapy Provided    Therapeutic Activities- Prior to engagement in dynamic activities, pt educated on role of OT while IP, activity pacing, safety, importance of early mobility for optimal recovery post operatively and return to home strategies. Patient rcvd up in chair ; pt is pleasant and cooperative.  Patient was able to complete STS from bedside with supervision; pt educated on hand placement and sequencing transitions; educated pt to increase height of surfaces for ease with transitions (e.g. raised toilet seat, pillows in chair); pt ambulatory reasonable household distances; pt demonstrated ability to complete functional t/f at supervision level; ADL retraining:  Patient educated on return to home safety strategies; Patient is able to complete LE dressing with supervision and increased time; pt demonstrated good ROM of BUE to address UE self care including feeding, grooming, and dressing.       DISCHARGE RECOMMENDATIONS  OT Discharge Recommendations: Home  OT Device Recommendations: None    PLAN  Patient has been evaluated and presents with no skilled Occupational Therapy needs  at this time.  Patient  will be discharged from Occupational Therapy services. Please re-order if a new functional limitation presents during this admission.    OCCUPATIONAL THERAPY MEDICAL/SOCIAL HISTORY   Problem List  Principal Problem:    Primary osteoarthritis of left hip  Active Problems:    Essential hypertension    Hyperlipidemia    HOME SITUATION  Type of Home: House  Home Layout: One level  Lives With: Significant other  Toilet and Equipment: Standard height toilet; Toilet riser  Shower/Tub and Equipment: Tub-shower combo  Other Equipment: Reacher; Sock aid  Drives: Yes  Patient Regularly Uses: Glasses  Prior Level of [place]: Indepenedent     SUBJECTIVE  I didn't sleep too good last night     OCCUPATIONAL THERAPY EXAMINATION    OBJECTIVE  Precautions: Limb alert - left (ROM as tolerated)  Fall Risk: Standard fall risk    WEIGHT BEARING RESTRICTION  L Lower Extremity: Weight Bearing as Tolerated    PAIN ASSESSMENT  Ratin (Mild- pain well controlled)    ACTIVITY TOLERANCE  Pulse: 76        BP: 93/72  BP Location: Right arm  BP Method: Automatic  Patient Position: Sitting    O2 SATURATIONS  Oxygen Therapy  SPO2% on Room Air at Rest: 96    COGNITION  Overall Cognitive Status:  WFL - within functional limits    Communication: WNL    Behavioral/Emotional/Social: Cooperative, pleasant     RANGE OF MOTION   Upper extremity ROM is within functional limits     STRENGTH ASSESSMENT  Upper extremity strength is within functional limits     COORDINATION  Gross Motor: WFL   Fine Motor: WFL     ACTIVITIES OF DAILY LIVING ASSESSMENT  AM-PAC ‘6-Clicks’ Inpatient Daily Activity Short Form  How much help from another person does the patient currently need…  -   Putting on and taking off regular lower body clothing?: A Little  -   Bathing (including washing, rinsing, drying)?: A Little  -   Toileting, which includes using toilet, bedpan or urinal? : A Little  -   Putting on and taking off regular upper body clothing?: A Little  -   Taking  care of personal grooming such as brushing teeth?: A Little  -   Eating meals?: A Little    AM-PAC Score:  Score: 18  Approx Degree of Impairment: 46.65%  Standardized Score (AM-PAC Scale): 38.66  CMS Modifier (G-Code): CK    FUNCTIONAL TRANSFER ASSESSMENT  Sit to Stand: Chair  Edge of Bed: Stand-by Assist  Chair: Stand-by Assist  Toilet Transfer: Stand-by Assist    BED MOBILITY     BALANCE ASSESSMENT     FUNCTIONAL ADL ASSESSMENT  Eating: Independent  Grooming Seated: Independent  Bathing Seated: Stand-by Assist  UB Dressing Seated: Supervision  LB Dressing Seated: Stand-by Assist  Toileting Seated: Stand-by Assist    EDUCATION PROVIDED  Patient : Role of Occupational Therapy; Plan of Care; Discharge Recommendations; Functional Transfer Techniques; Fall Prevention; Weight Bear Status; Surgical Precautions; Posture/Positioning; Compensatory ADL Techniques  Patient's Response to Education: Verbalized Understanding; Returned Demonstration    Patient End of Session: Up in chair;Needs met;Call light within reach;RN aware of session/findings;All patient questions and concerns addressed    Patient was able to achieve the following ...   Patient able to toilet transfer  MET    Patient able to dress lower extremities  MET    Patient/Caregiver able to demonstrate safety with ADLS  MET      Patient Evaluation Complexity Level:   Occupational Profile/Medical History LOW - Brief history including review of medical or therapy records    Specific performance deficits impacting engagement in ADL/IADL LOW  1 - 3 performance deficits    Client Assessment/Performance Deficits LOW - No comorbidities nor modifications of tasks    Clinical Decision Making LOW - Analysis of occupational profile, problem-focused assessments, limited treatment options    Overall Complexity LOW     OT Session Time: 25 minutes  Self-Care Home Management: 25 minutes

## 2024-02-08 ENCOUNTER — WALK IN (OUTPATIENT)
Dept: URGENT CARE | Age: 69
End: 2024-02-08
Attending: FAMILY MEDICINE

## 2024-02-08 VITALS
OXYGEN SATURATION: 98 % | TEMPERATURE: 97.8 F | HEART RATE: 83 BPM | SYSTOLIC BLOOD PRESSURE: 145 MMHG | RESPIRATION RATE: 18 BRPM | DIASTOLIC BLOOD PRESSURE: 78 MMHG

## 2024-02-08 DIAGNOSIS — R31.9 URINARY TRACT INFECTION WITH HEMATURIA, SITE UNSPECIFIED: Primary | ICD-10-CM

## 2024-02-08 DIAGNOSIS — N39.0 URINARY TRACT INFECTION WITH HEMATURIA, SITE UNSPECIFIED: Primary | ICD-10-CM

## 2024-02-08 LAB
APPEARANCE UR: ABNORMAL
BILIRUB UR QL STRIP: NEGATIVE
COLOR UR: YELLOW
GLUCOSE UR STRIP-MCNC: NEGATIVE MG/DL
HGB UR QL STRIP: ABNORMAL
KETONES UR STRIP-MCNC: NEGATIVE MG/DL
LEUKOCYTE ESTERASE UR QL STRIP: ABNORMAL
NITRITE UR QL STRIP: POSITIVE
PH UR STRIP: 7 UNITS (ref 5–7)
PROT UR STRIP-MCNC: NEGATIVE MG/DL
SP GR UR STRIP: 1.01 (ref 1–1.03)
UROBILINOGEN UR STRIP-MCNC: 0.2 MG/DL

## 2024-02-08 PROCEDURE — 81003 URINALYSIS AUTO W/O SCOPE: CPT | Performed by: PHYSICIAN ASSISTANT

## 2024-02-08 PROCEDURE — 87086 URINE CULTURE/COLONY COUNT: CPT | Performed by: PHYSICIAN ASSISTANT

## 2024-02-08 RX ORDER — FLUCONAZOLE 150 MG/1
150 TABLET ORAL ONCE
Qty: 1 TABLET | Refills: 0 | Status: SHIPPED | OUTPATIENT
Start: 2024-02-08 | End: 2024-02-08

## 2024-02-08 RX ORDER — CEPHALEXIN 500 MG/1
500 CAPSULE ORAL 2 TIMES DAILY
Qty: 14 CAPSULE | Refills: 0 | Status: SHIPPED | OUTPATIENT
Start: 2024-02-08 | End: 2024-02-15

## 2024-02-08 ASSESSMENT — PAIN SCALES - GENERAL: PAINLEVEL_OUTOF10: 0

## 2024-02-10 ENCOUNTER — TELEPHONE (OUTPATIENT)
Dept: URGENT CARE | Age: 69
End: 2024-02-10

## 2024-02-10 LAB — BACTERIA UR CULT: ABNORMAL

## 2024-02-20 ENCOUNTER — RESEARCH - ONCOLOGY (OUTPATIENT)
Dept: HEMATOLOGY/ONCOLOGY | Age: 69
End: 2024-02-20

## 2024-03-14 DIAGNOSIS — E78.2 MIXED HYPERLIPIDEMIA: ICD-10-CM

## 2024-03-15 RX ORDER — ATORVASTATIN CALCIUM 20 MG/1
TABLET, FILM COATED ORAL
Qty: 90 TABLET | Refills: 3 | Status: SHIPPED | OUTPATIENT
Start: 2024-03-15

## 2024-04-01 ENCOUNTER — EXTERNAL RECORD (OUTPATIENT)
Dept: HEALTH INFORMATION MANAGEMENT | Facility: OTHER | Age: 69
End: 2024-04-01

## 2024-04-05 ENCOUNTER — LAB SERVICES (OUTPATIENT)
Dept: LAB | Age: 69
End: 2024-04-05

## 2024-04-05 DIAGNOSIS — E78.2 MIXED HYPERLIPIDEMIA: ICD-10-CM

## 2024-04-05 DIAGNOSIS — I10 BENIGN ESSENTIAL HYPERTENSION: ICD-10-CM

## 2024-04-05 DIAGNOSIS — E55.9 HYPOVITAMINOSIS D: ICD-10-CM

## 2024-04-05 DIAGNOSIS — G47.33 OSA ON CPAP: ICD-10-CM

## 2024-04-05 DIAGNOSIS — I73.00 RAYNAUD'S SYNDROME WITHOUT GANGRENE: ICD-10-CM

## 2024-04-05 DIAGNOSIS — C54.1 ENDOMETRIAL ADENOCARCINOMA  (CMD): ICD-10-CM

## 2024-04-05 LAB
25(OH)D3+25(OH)D2 SERPL-MCNC: 55.9 NG/ML (ref 30–100)
ALBUMIN SERPL-MCNC: 4.2 G/DL (ref 3.6–5.1)
ALBUMIN/GLOB SERPL: 1.3 {RATIO} (ref 1–2.4)
ALP SERPL-CCNC: 105 UNITS/L (ref 45–117)
ALT SERPL-CCNC: 24 UNITS/L
ANION GAP SERPL CALC-SCNC: 6 MMOL/L (ref 7–19)
AST SERPL-CCNC: 13 UNITS/L
BASOPHILS # BLD: 0.1 K/MCL (ref 0–0.3)
BASOPHILS NFR BLD: 2 %
BILIRUB SERPL-MCNC: 0.4 MG/DL (ref 0.2–1)
BUN SERPL-MCNC: 35 MG/DL (ref 6–20)
BUN/CREAT SERPL: 49 (ref 7–25)
CALCIUM SERPL-MCNC: 8.9 MG/DL (ref 8.4–10.2)
CHLORIDE SERPL-SCNC: 108 MMOL/L (ref 97–110)
CHOLEST SERPL-MCNC: 177 MG/DL
CHOLEST/HDLC SERPL: 3 {RATIO}
CO2 SERPL-SCNC: 26 MMOL/L (ref 21–32)
CREAT SERPL-MCNC: 0.72 MG/DL (ref 0.51–0.95)
DEPRECATED RDW RBC: 42.8 FL (ref 39–50)
EGFRCR SERPLBLD CKD-EPI 2021: >90 ML/MIN/{1.73_M2}
EOSINOPHIL # BLD: 0.4 K/MCL (ref 0–0.5)
EOSINOPHIL NFR BLD: 7 %
ERYTHROCYTE [DISTWIDTH] IN BLOOD: 13.2 % (ref 11–15)
FASTING DURATION TIME PATIENT: 12 HOURS (ref 0–999)
GLOBULIN SER-MCNC: 3.3 G/DL (ref 2–4)
GLUCOSE SERPL-MCNC: 98 MG/DL (ref 70–99)
HCT VFR BLD CALC: 41.7 % (ref 36–46.5)
HDLC SERPL-MCNC: 59 MG/DL
HGB BLD-MCNC: 13 G/DL (ref 12–15.5)
IMM GRANULOCYTES # BLD AUTO: 0 K/MCL (ref 0–0.2)
IMM GRANULOCYTES # BLD: 0 %
LDLC SERPL CALC-MCNC: 103 MG/DL
LYMPHOCYTES # BLD: 1.1 K/MCL (ref 1–4)
LYMPHOCYTES NFR BLD: 21 %
MCH RBC QN AUTO: 27.4 PG (ref 26–34)
MCHC RBC AUTO-ENTMCNC: 31.2 G/DL (ref 32–36.5)
MCV RBC AUTO: 87.8 FL (ref 78–100)
MONOCYTES # BLD: 0.7 K/MCL (ref 0.3–0.9)
MONOCYTES NFR BLD: 13 %
NEUTROPHILS # BLD: 3.2 K/MCL (ref 1.8–7.7)
NEUTROPHILS NFR BLD: 57 %
NONHDLC SERPL-MCNC: 118 MG/DL
NRBC BLD MANUAL-RTO: 0 /100 WBC
PLATELET # BLD AUTO: 271 K/MCL (ref 140–450)
POTASSIUM SERPL-SCNC: 4.3 MMOL/L (ref 3.4–5.1)
PROT SERPL-MCNC: 7.5 G/DL (ref 6.4–8.2)
RBC # BLD: 4.75 MIL/MCL (ref 4–5.2)
SODIUM SERPL-SCNC: 136 MMOL/L (ref 135–145)
TRIGL SERPL-MCNC: 74 MG/DL
WBC # BLD: 5.5 K/MCL (ref 4.2–11)

## 2024-04-05 PROCEDURE — 80061 LIPID PANEL: CPT | Performed by: CLINICAL MEDICAL LABORATORY

## 2024-04-05 PROCEDURE — 36415 COLL VENOUS BLD VENIPUNCTURE: CPT | Performed by: CLINICAL MEDICAL LABORATORY

## 2024-04-05 PROCEDURE — 85025 COMPLETE CBC W/AUTO DIFF WBC: CPT | Performed by: CLINICAL MEDICAL LABORATORY

## 2024-04-05 PROCEDURE — 82306 VITAMIN D 25 HYDROXY: CPT | Performed by: CLINICAL MEDICAL LABORATORY

## 2024-04-05 PROCEDURE — 80053 COMPREHEN METABOLIC PANEL: CPT | Performed by: CLINICAL MEDICAL LABORATORY

## 2024-04-10 ENCOUNTER — APPOINTMENT (OUTPATIENT)
Dept: CARDIOLOGY | Age: 69
End: 2024-04-10

## 2024-04-10 ENCOUNTER — CLINICAL ABSTRACT (OUTPATIENT)
Dept: HEALTH INFORMATION MANAGEMENT | Facility: OTHER | Age: 69
End: 2024-04-10

## 2024-04-10 VITALS
HEART RATE: 64 BPM | WEIGHT: 201.39 LBS | OXYGEN SATURATION: 97 % | SYSTOLIC BLOOD PRESSURE: 124 MMHG | HEIGHT: 63 IN | DIASTOLIC BLOOD PRESSURE: 78 MMHG | BODY MASS INDEX: 35.68 KG/M2

## 2024-04-10 DIAGNOSIS — I10 BENIGN ESSENTIAL HYPERTENSION: Primary | ICD-10-CM

## 2024-04-10 DIAGNOSIS — G47.33 OSA ON CPAP: ICD-10-CM

## 2024-04-10 DIAGNOSIS — E78.2 MIXED HYPERLIPIDEMIA: ICD-10-CM

## 2024-04-10 DIAGNOSIS — I35.0 NONRHEUMATIC AORTIC VALVE STENOSIS: ICD-10-CM

## 2024-04-10 DIAGNOSIS — E55.9 HYPOVITAMINOSIS D: ICD-10-CM

## 2024-04-10 DIAGNOSIS — C54.1 ENDOMETRIAL ADENOCARCINOMA (CMD): ICD-10-CM

## 2024-04-11 ENCOUNTER — APPOINTMENT (OUTPATIENT)
Dept: LAB | Age: 69
End: 2024-04-11

## 2024-04-11 ENCOUNTER — OFFICE VISIT (OUTPATIENT)
Dept: GENETICS | Age: 69
End: 2024-04-11
Attending: OBSTETRICS & GYNECOLOGY

## 2024-04-11 ENCOUNTER — APPOINTMENT (OUTPATIENT)
Dept: CARDIOLOGY | Age: 69
End: 2024-04-11

## 2024-04-11 DIAGNOSIS — Z80.41 FAMILY HISTORY OF MALIGNANT NEOPLASM OF OVARY: ICD-10-CM

## 2024-04-11 DIAGNOSIS — Z80.49 FAMILY HISTORY OF MALIGNANT NEOPLASM OF UTERUS: ICD-10-CM

## 2024-04-11 DIAGNOSIS — C54.1 ENDOMETRIAL CANCER (CMD): Primary | ICD-10-CM

## 2024-04-11 LAB — BKR KIT TESTING DISCLAIMER STATEMENT: NORMAL

## 2024-04-11 PROCEDURE — 36415 COLL VENOUS BLD VENIPUNCTURE: CPT

## 2024-04-11 PROCEDURE — 96040 HB GENETIC COUNSELING EACH 30 MIN: CPT | Performed by: GENETIC COUNSELOR, MS

## 2024-04-26 ENCOUNTER — EXTERNAL RECORD (OUTPATIENT)
Dept: HEALTH INFORMATION MANAGEMENT | Facility: OTHER | Age: 69
End: 2024-04-26

## 2024-05-09 ENCOUNTER — TELEPHONE (OUTPATIENT)
Dept: GENETICS | Age: 69
End: 2024-05-09

## 2024-07-15 ENCOUNTER — TELEPHONE (OUTPATIENT)
Dept: FAMILY MEDICINE | Age: 69
End: 2024-07-15

## 2024-09-10 DIAGNOSIS — I10 BENIGN ESSENTIAL HYPERTENSION: ICD-10-CM

## 2024-09-11 RX ORDER — LISINOPRIL 10 MG/1
10 TABLET ORAL DAILY
Qty: 90 TABLET | Refills: 3 | Status: SHIPPED | OUTPATIENT
Start: 2024-09-11

## 2024-10-10 ENCOUNTER — APPOINTMENT (OUTPATIENT)
Dept: CARDIOLOGY | Age: 69
End: 2024-10-10
Attending: INTERNAL MEDICINE

## 2024-10-10 DIAGNOSIS — G47.33 OSA ON CPAP: ICD-10-CM

## 2024-10-10 DIAGNOSIS — I10 BENIGN ESSENTIAL HYPERTENSION: ICD-10-CM

## 2024-10-10 DIAGNOSIS — E78.2 MIXED HYPERLIPIDEMIA: ICD-10-CM

## 2024-10-10 DIAGNOSIS — C54.1 ENDOMETRIAL ADENOCARCINOMA  (CMD): ICD-10-CM

## 2024-10-10 DIAGNOSIS — I35.0 NONRHEUMATIC AORTIC VALVE STENOSIS: ICD-10-CM

## 2024-10-10 LAB
AORTIC VALVE AREA (AVA): 0.68
AORTIC VALVE AREA: 0.71
ASCENDING AORTA (AAD): 3
AV MEAN GRADIENT (AVMG): 34
AV MEAN VELOCITY (AVMV): 2.65
AV PEAK GRADIENT (AVPG): 60
AV PEAK VELOCITY (AVPV): 3.88
AV STENOSIS SEVERITY TEXT: NORMAL
AVI LVOT PEAK GRADIENT (LVOTMG): 1
DOP CALC LVOT PEAK VEL (LVOTPV): 1
E WAVE DECELARATION TIME (MDT): 17.06
INTERVENTRICULAR SEPTUM IN END DIASTOLE (IVSD): 2.33
LEFT INTERNAL DIMENSION IN SYSTOLE (LVSD): 0.9
LEFT VENTRICULAR INTERNAL DIMENSION IN DIASTOLE (LVDD): 3.4
LEFT VENTRICULAR POSTERIOR WALL IN END DIASTOLE (LVPW): 5.1
LV EF: NORMAL %
LVOT 2D (LVOTD): 16.6
LVOT VTI (LVOTVTI): 0.74
MV E TISSUE VEL MED (MESV): 5.72
MV E WAVE VEL/E TISSUE VEL MED(MSR): 3.98
MV PEAK A VELOCITY (MVPAV): 355
MV PEAK E VELOCITY (MVPEV): 1.01
RV END SYSTOLIC LONGITUDINAL STRAIN FREE WALL (RVGS): 2.2
TRICUSPID VALVE ANNULAR PEAK VELOCITY (TVAPV): 21
TRICUSPID VALVE PEAK REGURGITATION VELOCITY (TRPV): 3.8
TV ESTIMATED RIGHT ARTERIAL PRESSURE (RAP): 10.2

## 2024-10-10 PROCEDURE — 76376 3D RENDER W/INTRP POSTPROCES: CPT | Performed by: INTERNAL MEDICINE

## 2024-10-10 PROCEDURE — 93306 TTE W/DOPPLER COMPLETE: CPT | Performed by: INTERNAL MEDICINE

## 2024-10-25 ENCOUNTER — LAB SERVICES (OUTPATIENT)
Dept: LAB | Age: 69
End: 2024-10-25

## 2024-10-25 DIAGNOSIS — G47.33 OSA ON CPAP: ICD-10-CM

## 2024-10-25 DIAGNOSIS — C54.1 ENDOMETRIAL ADENOCARCINOMA  (CMD): ICD-10-CM

## 2024-10-25 DIAGNOSIS — I10 BENIGN ESSENTIAL HYPERTENSION: ICD-10-CM

## 2024-10-25 DIAGNOSIS — E55.9 HYPOVITAMINOSIS D: ICD-10-CM

## 2024-10-25 DIAGNOSIS — I35.0 NONRHEUMATIC AORTIC VALVE STENOSIS: ICD-10-CM

## 2024-10-25 DIAGNOSIS — E78.2 MIXED HYPERLIPIDEMIA: ICD-10-CM

## 2024-10-25 LAB
25(OH)D3+25(OH)D2 SERPL-MCNC: 62.2 NG/ML (ref 30–100)
ALBUMIN SERPL-MCNC: 4.1 G/DL (ref 3.4–5)
ALBUMIN/GLOB SERPL: 1.2 {RATIO} (ref 1–2.4)
ALP SERPL-CCNC: 78 UNITS/L (ref 45–117)
ALT SERPL-CCNC: 25 UNITS/L
ANION GAP SERPL CALC-SCNC: 6 MMOL/L (ref 7–19)
AST SERPL-CCNC: 15 UNITS/L
BASOPHILS # BLD: 0.1 K/MCL (ref 0–0.3)
BASOPHILS NFR BLD: 2 %
BILIRUB SERPL-MCNC: 0.5 MG/DL (ref 0.2–1)
BUN SERPL-MCNC: 28 MG/DL (ref 6–20)
BUN/CREAT SERPL: 39 (ref 7–25)
CALCIUM SERPL-MCNC: 9.1 MG/DL (ref 8.4–10.2)
CHLORIDE SERPL-SCNC: 110 MMOL/L (ref 97–110)
CHOLEST SERPL-MCNC: 133 MG/DL
CHOLEST/HDLC SERPL: 2.6 {RATIO}
CO2 SERPL-SCNC: 27 MMOL/L (ref 21–32)
CREAT SERPL-MCNC: 0.71 MG/DL (ref 0.51–0.95)
DEPRECATED RDW RBC: 41.7 FL (ref 39–50)
EGFRCR SERPLBLD CKD-EPI 2021: >90 ML/MIN/{1.73_M2}
EOSINOPHIL # BLD: 0.3 K/MCL (ref 0–0.5)
EOSINOPHIL NFR BLD: 5 %
ERYTHROCYTE [DISTWIDTH] IN BLOOD: 13.2 % (ref 11–15)
FASTING DURATION TIME PATIENT: ABNORMAL H
GLOBULIN SER-MCNC: 3.3 G/DL (ref 2–4)
GLUCOSE SERPL-MCNC: 102 MG/DL (ref 70–99)
HCT VFR BLD CALC: 43 % (ref 36–46.5)
HDLC SERPL-MCNC: 51 MG/DL
HGB BLD-MCNC: 14.1 G/DL (ref 12–15.5)
IMM GRANULOCYTES # BLD AUTO: 0 K/MCL (ref 0–0.2)
IMM GRANULOCYTES # BLD: 0 %
LDLC SERPL CALC-MCNC: 70 MG/DL
LYMPHOCYTES # BLD: 1.3 K/MCL (ref 1–4)
LYMPHOCYTES NFR BLD: 23 %
MCH RBC QN AUTO: 28.5 PG (ref 26–34)
MCHC RBC AUTO-ENTMCNC: 32.8 G/DL (ref 32–36.5)
MCV RBC AUTO: 86.9 FL (ref 78–100)
MONOCYTES # BLD: 0.9 K/MCL (ref 0.3–0.9)
MONOCYTES NFR BLD: 15 %
NEUTROPHILS # BLD: 3.1 K/MCL (ref 1.8–7.7)
NEUTROPHILS NFR BLD: 55 %
NONHDLC SERPL-MCNC: 82 MG/DL
NRBC BLD MANUAL-RTO: 0 /100 WBC
PLATELET # BLD AUTO: 267 K/MCL (ref 140–450)
POTASSIUM SERPL-SCNC: 5 MMOL/L (ref 3.4–5.1)
PROT SERPL-MCNC: 7.4 G/DL (ref 6.4–8.2)
RBC # BLD: 4.95 MIL/MCL (ref 4–5.2)
SODIUM SERPL-SCNC: 138 MMOL/L (ref 135–145)
TRIGL SERPL-MCNC: 59 MG/DL
WBC # BLD: 5.6 K/MCL (ref 4.2–11)

## 2024-10-25 PROCEDURE — 80053 COMPREHEN METABOLIC PANEL: CPT | Performed by: CLINICAL MEDICAL LABORATORY

## 2024-10-25 PROCEDURE — 36415 COLL VENOUS BLD VENIPUNCTURE: CPT | Performed by: CLINICAL MEDICAL LABORATORY

## 2024-10-25 PROCEDURE — 85025 COMPLETE CBC W/AUTO DIFF WBC: CPT | Performed by: CLINICAL MEDICAL LABORATORY

## 2024-10-25 PROCEDURE — 82306 VITAMIN D 25 HYDROXY: CPT | Performed by: CLINICAL MEDICAL LABORATORY

## 2024-10-25 PROCEDURE — 80061 LIPID PANEL: CPT | Performed by: CLINICAL MEDICAL LABORATORY

## 2024-10-29 ENCOUNTER — APPOINTMENT (OUTPATIENT)
Dept: CARDIOLOGY | Age: 69
End: 2024-10-29

## 2024-10-29 VITALS
BODY MASS INDEX: 36.76 KG/M2 | OXYGEN SATURATION: 96 % | HEART RATE: 61 BPM | SYSTOLIC BLOOD PRESSURE: 129 MMHG | DIASTOLIC BLOOD PRESSURE: 76 MMHG | WEIGHT: 207.45 LBS | HEIGHT: 63 IN

## 2024-10-29 DIAGNOSIS — I73.00 RAYNAUD'S SYNDROME WITHOUT GANGRENE: ICD-10-CM

## 2024-10-29 DIAGNOSIS — I10 BENIGN ESSENTIAL HYPERTENSION: ICD-10-CM

## 2024-10-29 DIAGNOSIS — E78.2 MIXED HYPERLIPIDEMIA: ICD-10-CM

## 2024-10-29 DIAGNOSIS — C54.1 ENDOMETRIAL ADENOCARCINOMA  (CMD): ICD-10-CM

## 2024-10-29 DIAGNOSIS — I25.10 CORONARY ARTERY CALCIFICATION SEEN ON CT SCAN: Primary | ICD-10-CM

## 2024-10-29 SDOH — HEALTH STABILITY: PHYSICAL HEALTH: ON AVERAGE, HOW MANY DAYS PER WEEK DO YOU ENGAGE IN MODERATE TO STRENUOUS EXERCISE (LIKE A BRISK WALK)?: 0 DAYS

## 2024-10-29 SDOH — HEALTH STABILITY: PHYSICAL HEALTH: ON AVERAGE, HOW MANY MINUTES DO YOU ENGAGE IN EXERCISE AT THIS LEVEL?: 0 MIN

## 2024-11-18 ENCOUNTER — APPOINTMENT (OUTPATIENT)
Dept: CARDIOLOGY | Age: 69
End: 2024-11-18

## 2024-11-18 VITALS
HEIGHT: 63 IN | BODY MASS INDEX: 37.11 KG/M2 | SYSTOLIC BLOOD PRESSURE: 127 MMHG | DIASTOLIC BLOOD PRESSURE: 79 MMHG | HEART RATE: 69 BPM | WEIGHT: 209.44 LBS

## 2024-11-18 DIAGNOSIS — I35.0 NONRHEUMATIC AORTIC VALVE STENOSIS: Primary | ICD-10-CM

## 2024-11-18 DIAGNOSIS — E66.01 CLASS 2 SEVERE OBESITY WITH BODY MASS INDEX (BMI) OF 35 TO 39.9 WITH SERIOUS COMORBIDITY (CMD): ICD-10-CM

## 2024-11-18 DIAGNOSIS — E66.812 CLASS 2 SEVERE OBESITY WITH BODY MASS INDEX (BMI) OF 35 TO 39.9 WITH SERIOUS COMORBIDITY (CMD): ICD-10-CM

## 2024-11-18 SDOH — HEALTH STABILITY: PHYSICAL HEALTH: ON AVERAGE, HOW MANY MINUTES DO YOU ENGAGE IN EXERCISE AT THIS LEVEL?: 0 MIN

## 2024-11-18 SDOH — HEALTH STABILITY: PHYSICAL HEALTH: ON AVERAGE, HOW MANY DAYS PER WEEK DO YOU ENGAGE IN MODERATE TO STRENUOUS EXERCISE (LIKE A BRISK WALK)?: 0 DAYS

## 2024-11-18 ASSESSMENT — PATIENT HEALTH QUESTIONNAIRE - PHQ9
1. LITTLE INTEREST OR PLEASURE IN DOING THINGS: NOT AT ALL
CLINICAL INTERPRETATION OF PHQ2 SCORE: NO FURTHER SCREENING NEEDED
2. FEELING DOWN, DEPRESSED OR HOPELESS: NOT AT ALL
SUM OF ALL RESPONSES TO PHQ9 QUESTIONS 1 AND 2: 0
SUM OF ALL RESPONSES TO PHQ9 QUESTIONS 1 AND 2: 0

## 2024-12-02 ENCOUNTER — OFF PREMISE (OUTPATIENT)
Dept: CARDIOLOGY | Age: 69
End: 2024-12-02

## 2024-12-02 PROCEDURE — 88175 CYTOPATH C/V AUTO FLUID REDO: CPT | Performed by: CLINICAL MEDICAL LABORATORY

## 2024-12-02 PROCEDURE — 87624 HPV HI-RISK TYP POOLED RSLT: CPT | Performed by: CLINICAL MEDICAL LABORATORY

## 2024-12-03 ENCOUNTER — LAB REQUISITION (OUTPATIENT)
Dept: LAB | Age: 69
End: 2024-12-03

## 2024-12-03 DIAGNOSIS — C54.1 MALIGNANT NEOPLASM OF ENDOMETRIUM  (CMD): ICD-10-CM

## 2024-12-03 DIAGNOSIS — Z12.72 ENCOUNTER FOR SCREENING FOR MALIGNANT NEOPLASM OF VAGINA: ICD-10-CM

## 2024-12-10 LAB
CASE RPRT: NORMAL
CYTOLOGY CVX/VAG STUDY: NORMAL
CYTOLOGY CVX/VAG STUDY: NORMAL
HPV16+18+45 E6+E7MRNA CVX NAA+PROBE: NEGATIVE
Lab: NORMAL
PAP EDUCATIONAL NOTE: NORMAL
SPECIMEN ADEQUACY: NORMAL

## 2025-01-13 SDOH — ECONOMIC STABILITY: FOOD INSECURITY: WITHIN THE PAST 12 MONTHS, THE FOOD YOU BOUGHT JUST DIDN'T LAST AND YOU DIDN'T HAVE MONEY TO GET MORE.: NEVER TRUE

## 2025-01-13 SDOH — ECONOMIC STABILITY: HOUSING INSECURITY: WHAT IS YOUR LIVING SITUATION TODAY?: I HAVE A STEADY PLACE TO LIVE

## 2025-01-13 SDOH — ECONOMIC STABILITY: HOUSING INSECURITY: DO YOU HAVE PROBLEMS WITH ANY OF THE FOLLOWING?: NONE OF THE ABOVE

## 2025-01-13 SDOH — ECONOMIC STABILITY: TRANSPORTATION INSECURITY
IN THE PAST 12 MONTHS, HAS LACK OF RELIABLE TRANSPORTATION KEPT YOU FROM MEDICAL APPOINTMENTS, MEETINGS, WORK OR FROM GETTING THINGS NEEDED FOR DAILY LIVING?: NO

## 2025-01-13 SDOH — ECONOMIC STABILITY: GENERAL: WOULD YOU LIKE HELP WITH ANY OF THE FOLLOWING NEEDS?: I DON'T WANT HELP WITH ANY OF THESE

## 2025-01-13 ASSESSMENT — SOCIAL DETERMINANTS OF HEALTH (SDOH): IN THE PAST 12 MONTHS, HAS THE ELECTRIC, GAS, OIL, OR WATER COMPANY THREATENED TO SHUT OFF SERVICE IN YOUR HOME?: NO

## 2025-01-17 ENCOUNTER — TELEPHONE (OUTPATIENT)
Dept: FAMILY MEDICINE | Age: 70
End: 2025-01-17

## 2025-01-20 ENCOUNTER — E-ADVICE (OUTPATIENT)
Dept: FAMILY MEDICINE | Age: 70
End: 2025-01-20

## 2025-01-20 ENCOUNTER — APPOINTMENT (OUTPATIENT)
Dept: FAMILY MEDICINE | Age: 70
End: 2025-01-20

## 2025-01-20 ENCOUNTER — LAB SERVICES (OUTPATIENT)
Dept: LAB | Age: 70
End: 2025-01-20

## 2025-01-20 VITALS
DIASTOLIC BLOOD PRESSURE: 82 MMHG | HEART RATE: 71 BPM | TEMPERATURE: 98.7 F | SYSTOLIC BLOOD PRESSURE: 126 MMHG | OXYGEN SATURATION: 98 % | HEIGHT: 63 IN | RESPIRATION RATE: 16 BRPM | WEIGHT: 202 LBS | BODY MASS INDEX: 35.79 KG/M2

## 2025-01-20 DIAGNOSIS — I35.0 NONRHEUMATIC AORTIC VALVE STENOSIS: ICD-10-CM

## 2025-01-20 DIAGNOSIS — I10 BENIGN ESSENTIAL HYPERTENSION: ICD-10-CM

## 2025-01-20 DIAGNOSIS — E66.811 OBESITY (BMI 30.0-34.9): ICD-10-CM

## 2025-01-20 DIAGNOSIS — G47.33 OSA ON CPAP: ICD-10-CM

## 2025-01-20 DIAGNOSIS — Z78.0 POSTMENOPAUSAL: ICD-10-CM

## 2025-01-20 DIAGNOSIS — Z01.818 PREOPERATIVE EVALUATION TO RULE OUT SURGICAL CONTRAINDICATION: ICD-10-CM

## 2025-01-20 DIAGNOSIS — Z01.818 PREOPERATIVE EVALUATION TO RULE OUT SURGICAL CONTRAINDICATION: Primary | ICD-10-CM

## 2025-01-20 DIAGNOSIS — R79.1 ABNORMAL COAGULATION PROFILE: ICD-10-CM

## 2025-01-20 DIAGNOSIS — E78.2 MIXED HYPERLIPIDEMIA: ICD-10-CM

## 2025-01-20 DIAGNOSIS — C54.1 ENDOMETRIAL ADENOCARCINOMA  (CMD): ICD-10-CM

## 2025-01-20 LAB
ALBUMIN SERPL-MCNC: 4.5 G/DL (ref 3.4–5)
ALBUMIN/GLOB SERPL: 1.3 {RATIO} (ref 1–2.4)
ALP SERPL-CCNC: 77 UNITS/L (ref 45–117)
ALT SERPL-CCNC: 20 UNITS/L
ANION GAP SERPL CALC-SCNC: 9 MMOL/L (ref 7–19)
AST SERPL-CCNC: 12 UNITS/L
BASOPHILS # BLD: 0.1 K/MCL (ref 0–0.3)
BASOPHILS NFR BLD: 1 %
BILIRUB SERPL-MCNC: 0.5 MG/DL (ref 0.2–1)
BUN SERPL-MCNC: 30 MG/DL (ref 6–20)
BUN/CREAT SERPL: 44 (ref 7–25)
CALCIUM SERPL-MCNC: 9.9 MG/DL (ref 8.4–10.2)
CHLORIDE SERPL-SCNC: 105 MMOL/L (ref 97–110)
CO2 SERPL-SCNC: 27 MMOL/L (ref 21–32)
CREAT SERPL-MCNC: 0.68 MG/DL (ref 0.51–0.95)
DEPRECATED RDW RBC: 44.2 FL (ref 39–50)
EGFRCR SERPLBLD CKD-EPI 2021: >90 ML/MIN/{1.73_M2}
EOSINOPHIL # BLD: 0.2 K/MCL (ref 0–0.5)
EOSINOPHIL NFR BLD: 2 %
ERYTHROCYTE [DISTWIDTH] IN BLOOD: 13.6 % (ref 11–15)
FASTING DURATION TIME PATIENT: ABNORMAL H
GLOBULIN SER-MCNC: 3.5 G/DL (ref 2–4)
GLUCOSE SERPL-MCNC: 93 MG/DL (ref 70–99)
HBA1C MFR BLD: 5.5 % (ref 4.5–5.6)
HCT VFR BLD CALC: 44.6 % (ref 36–46.5)
HGB BLD-MCNC: 14.4 G/DL (ref 12–15.5)
IMM GRANULOCYTES # BLD AUTO: 0 K/MCL (ref 0–0.2)
IMM GRANULOCYTES # BLD: 1 %
INR PPP: 1
LYMPHOCYTES # BLD: 1.3 K/MCL (ref 1–4)
LYMPHOCYTES NFR BLD: 17 %
MCH RBC QN AUTO: 28.5 PG (ref 26–34)
MCHC RBC AUTO-ENTMCNC: 32.3 G/DL (ref 32–36.5)
MCV RBC AUTO: 88.3 FL (ref 78–100)
MONOCYTES # BLD: 0.9 K/MCL (ref 0.3–0.9)
MONOCYTES NFR BLD: 11 %
NEUTROPHILS # BLD: 5.1 K/MCL (ref 1.8–7.7)
NEUTROPHILS NFR BLD: 68 %
NRBC BLD MANUAL-RTO: 0 /100 WBC
PLATELET # BLD AUTO: 336 K/MCL (ref 140–450)
POTASSIUM SERPL-SCNC: 4.3 MMOL/L (ref 3.4–5.1)
PROT SERPL-MCNC: 8 G/DL (ref 6.4–8.2)
PROTHROMBIN TIME: 11.1 SEC (ref 9.7–11.8)
RAINBOW EXTRA TUBES HOLD SPECIMEN: NORMAL
RBC # BLD: 5.05 MIL/MCL (ref 4–5.2)
SODIUM SERPL-SCNC: 137 MMOL/L (ref 135–145)
WBC # BLD: 7.6 K/MCL (ref 4.2–11)

## 2025-01-20 PROCEDURE — 85610 PROTHROMBIN TIME: CPT | Performed by: CLINICAL MEDICAL LABORATORY

## 2025-01-20 PROCEDURE — 99214 OFFICE O/P EST MOD 30 MIN: CPT | Performed by: FAMILY MEDICINE

## 2025-01-20 PROCEDURE — 36415 COLL VENOUS BLD VENIPUNCTURE: CPT | Performed by: CLINICAL MEDICAL LABORATORY

## 2025-01-20 PROCEDURE — 80053 COMPREHEN METABOLIC PANEL: CPT | Performed by: CLINICAL MEDICAL LABORATORY

## 2025-01-20 PROCEDURE — 85025 COMPLETE CBC W/AUTO DIFF WBC: CPT | Performed by: CLINICAL MEDICAL LABORATORY

## 2025-01-20 PROCEDURE — 83036 HEMOGLOBIN GLYCOSYLATED A1C: CPT | Performed by: CLINICAL MEDICAL LABORATORY

## 2025-01-20 ASSESSMENT — ENCOUNTER SYMPTOMS
VOMITING: 0
CHEST TIGHTNESS: 0
ABDOMINAL PAIN: 0
COUGH: 0
SHORTNESS OF BREATH: 0
NAUSEA: 0
CONSTIPATION: 0
WHEEZING: 0
FEVER: 0

## 2025-01-21 ENCOUNTER — E-ADVICE (OUTPATIENT)
Dept: FAMILY MEDICINE | Age: 70
End: 2025-01-21

## 2025-01-21 ASSESSMENT — PATIENT HEALTH QUESTIONNAIRE - PHQ9
1. LITTLE INTEREST OR PLEASURE IN DOING THINGS: NOT AT ALL
SUM OF ALL RESPONSES TO PHQ9 QUESTIONS 1 AND 2: 0
2. FEELING DOWN, DEPRESSED OR HOPELESS: NOT AT ALL
CLINICAL INTERPRETATION OF PHQ2 SCORE: NO FURTHER SCREENING NEEDED
SUM OF ALL RESPONSES TO PHQ9 QUESTIONS 1 AND 2: 0

## 2025-01-22 ENCOUNTER — APPOINTMENT (OUTPATIENT)
Dept: CARDIOLOGY | Age: 70
End: 2025-01-22

## 2025-01-22 VITALS
BODY MASS INDEX: 36.7 KG/M2 | OXYGEN SATURATION: 98 % | SYSTOLIC BLOOD PRESSURE: 125 MMHG | WEIGHT: 207.12 LBS | DIASTOLIC BLOOD PRESSURE: 79 MMHG | HEIGHT: 63 IN | HEART RATE: 69 BPM

## 2025-01-22 DIAGNOSIS — Z01.818 PRE-OP EXAM: Primary | ICD-10-CM

## 2025-01-22 PROCEDURE — 99214 OFFICE O/P EST MOD 30 MIN: CPT | Performed by: INTERNAL MEDICINE

## 2025-01-22 PROCEDURE — 93000 ELECTROCARDIOGRAM COMPLETE: CPT | Performed by: INTERNAL MEDICINE

## 2025-01-22 SDOH — HEALTH STABILITY: PHYSICAL HEALTH: ON AVERAGE, HOW MANY MINUTES DO YOU ENGAGE IN EXERCISE AT THIS LEVEL?: 0 MIN

## 2025-01-22 SDOH — HEALTH STABILITY: MENTAL HEALTH: HOW OFTEN DO YOU HAVE A DRINK CONTAINING ALCOHOL?: NEVER

## 2025-01-22 SDOH — HEALTH STABILITY: PHYSICAL HEALTH: ON AVERAGE, HOW MANY DAYS PER WEEK DO YOU ENGAGE IN MODERATE TO STRENUOUS EXERCISE (LIKE A BRISK WALK)?: 0 DAYS

## 2025-01-22 ASSESSMENT — PATIENT HEALTH QUESTIONNAIRE - PHQ9
CLINICAL INTERPRETATION OF PHQ2 SCORE: NO FURTHER SCREENING NEEDED
SUM OF ALL RESPONSES TO PHQ9 QUESTIONS 1 AND 2: 0
2. FEELING DOWN, DEPRESSED OR HOPELESS: NOT AT ALL
SUM OF ALL RESPONSES TO PHQ9 QUESTIONS 1 AND 2: 0
1. LITTLE INTEREST OR PLEASURE IN DOING THINGS: NOT AT ALL

## 2025-01-24 LAB
ATRIAL RATE (BPM): 64
P AXIS (DEGREES): 8
PR-INTERVAL (MSEC): 194
QRS-INTERVAL (MSEC): 92
QT-INTERVAL (MSEC): 390
QTC: 402
R AXIS (DEGREES): -38
REPORT TEXT: NORMAL
T AXIS (DEGREES): 0
VENTRICULAR RATE EKG/MIN (BPM): 64

## 2025-02-05 RX ORDER — ASPIRIN 325 MG
325 TABLET ORAL DAILY
COMMUNITY
End: 2025-02-12

## 2025-02-07 ENCOUNTER — LAB ENCOUNTER (OUTPATIENT)
Dept: LAB | Age: 70
End: 2025-02-07
Attending: ORTHOPAEDIC SURGERY
Payer: MEDICARE

## 2025-02-07 DIAGNOSIS — Z01.818 PREOP TESTING: ICD-10-CM

## 2025-02-07 PROCEDURE — 87641 MR-STAPH DNA AMP PROBE: CPT

## 2025-02-08 LAB — MRSA DNA SPEC QL NAA+PROBE: NEGATIVE

## 2025-02-10 NOTE — H&P
[facility]  part of [facility]    History & Physical    Clyde Castro Patient Status:  Outpatient in a Bed    1955 MRN [id number]   Location [facility] OPERATING ROOM Attending Leon Piña*   Hosp Day # 0 PCP WILLIAM HULESCH     Date:  2/10/2025  Date of Admission:  (Not on file)    History provided by:patient  Chief Complaint:   No chief complaint on file.      HPI:   Clyde Castro is a(n) 69 year old female. Apresenting for left greater than right shoulder pain. She reports this pain has been ongoing over the last year and is aggravated by overhead movements and lifting. She reports significantly decreased range of motion and strength in the left shoulder. She denies PT, injections, or MRI to either shoulder. She denies numbness and tingling in the bilateral upper extrmeties.     History     Past Medical History:    Anesthesia complication    heart rates drops in 30's, breathing issues    Aortic stenosis    valve    Back problem    Cancer (HCC)    uterine    Esophageal reflux    Glaucoma screening    sees Dr Thakkar    High blood pressure    High cholesterol    Hx of motion sickness    Muscle weakness    Neuropathy    Osteoarthrosis, unspecified whether generalized or localized, unspecified site    neck - hx C5-6 fx (on aleve)    PONV (postoperative nausea and vomiting)    Raynaud disease    Sleep apnea    CPAP    Visual impairment    readers     Past Surgical History:   Procedure Laterality Date    Appendectomy  1967      1992    Colonoscopy  1980    Eye surgery      Other surgical history  1980    R foot neuroma removal    Other surgical history  2004    L breast lumpectomy    Skin surgery  2014    MMS for BCC superfic to L superior nasal ala    Spine surgery procedure unlisted      cervical fusions     Family History   Problem Relation Age of Onset    Heart Disorder Father 47        CAD/MI    Other Mother         kidney  stones, hypothyroidism    Diabetes Mother     Other Sister 55        CVA    Obesity Sister     Hypertension Neg     Lipids Neg     Cancer Neg      Social History:  Social History     Socioeconomic History    Marital status: Single   Tobacco Use    Smoking status: Former     Current packs/day: 1.00     Average packs/day: 1 pack/day for 7.0 years (7.0 ttl pk-yrs)     Types: Cigarettes    Smokeless tobacco: Never    Tobacco comments:     quit over 30 years ago   Vaping Use    Vaping status: Never Used   Substance and Sexual Activity    Alcohol use: Yes     Comment: 1 x weekly    Drug use: Not Currently     Social Drivers of Health     Food Insecurity: Low Risk  (1/13/2025)    Received from [facility]    Food Insecurity     Within the past 12 months, you worried that your food would run out before you got money to buy more.  : Never true     Within the past 12 months, the food you bought just didn't last and you didn't have money to get more. : Never true   Transportation Needs: Not At Risk (1/13/2025)    Received from [facility]    Transportation Needs     In the past 12 months, has lack of reliable transportation kept you from medical appointments, meetings, work or from getting things needed for daily living? : No   Housing Stability: Low Risk  (2/2/2024)    Housing Stability     Housing Instability: No     Allergies/Medications:   Allergies: Allergies[1]  No medications prior to admission.       Review of Systems:   A comprehensive review of systems was negative.    Physical Exam:   Vital Signs:  Height 5' 3\" (1.6 m), weight 210 lb (95.3 kg).     Patient is alert and oriented in no apparent distress.     Left Shoulder: They have forward flexion to 120, abduction to 70, external rotation to 45 and internal rotation to side pocket. Patient has 4/5 strength with forward flexion in the scapular plane, 4/5 strength with external rotation with the arm at the side, and 4/5 strength with internal  rotation of the left shoulder.    No tenderness over the AC joint. No tenderness over the bicipital groove. No tenderness over the clavicle, SC joint, scapular spine or scapular borders.     Negative apprehension. Negative sulcus sign. Positive Impingement Tests , Negative Isi's Test, Negative belly press or lift off.  Negative Horn Blower Sign. Negative Republic's, Negative Speeds.           Results:   No results found for: \"WBC\", \"HGB\", \"HCT\", \"PLT\", \"CREATSERUM\", \"BUN\", \"NA\", \"K\", \"CL\", \"CO2\", \"GLU\", \"CA\", \"ALB\", \"ALKPHO\", \"BILT\", \"TP\", \"AST\", \"ALT\", \"PTT\", \"INR\", \"PT\", \"T4F\", \"TSH\", \"TSHREFLEX\", \"JESSE\", \"LIP\", \"GGT\", \"PSA\", \"DDIMER\", \"ESRML\", \"ESRPF\", \"CRP\", \"BNP\", \"MG\", \"PHOS\", \"TROPHS\", \"CK\", \"CKMB\", \"JAZMYNE\", \"RPR\", \"B12\", \"ETOH\", \"POCGLU\"    No results found.        Assessment/Plan:     * No active hospital problems. *  ased on history, physical exam, and imaging studies, the patient has pain consistent with left GH OA. Due to failed conservative treatment with time, activity modification, NSAIDs, PT, and injections, we would recommend surgical intervention. Surgery would include a left anatomic TSA, OBT. The risks, benefits, alternative treatments to surgery were as well as the postop course of recovery were thoroughly discussed with the patient. The risks include, but are not limited to bleeding, blood clot, nerve injury, vascular injury, RTC injury, infection, persistent pain, stiffness, and/or other unpredictable medical complications, revision arthroplasty, fracture, instability. The patient can schedule surgery at their convenience. Patient understands pre-operative clearance needs to be obtained within 30 days of scheduled procedure. Failure to complete and have submitted to my office in appropriate time frame may result in delay of surgery. CBC, CMP, PT/PTT and other preoperative orders were provided today. All questions were answered, they understand and agree with the plan of care.    Leon Ponce  MD [facility]  2/10/2025         [1]   Allergies  Allergen Reactions    Apple SWELLING     Fresh apples, swelling in the mouth    Beer HIVES    Cat Hair Extract SHORTNESS OF BREATH and OTHER (SEE COMMENTS)     welts    Cherry SWELLING     swelling in the toungue    Iodine (Topical) HIVES    Meclizine OTHER (SEE COMMENTS)     Heart issues    Red Wine Extract HIVES    Shellfish HIVES    Benadryl [Diphenhydramine] OTHER (SEE COMMENTS)     Cardiac disturbances    Cyclobenzaprine OTHER (SEE COMMENTS)     Cardiac disturbances    Dander OTHER (SEE COMMENTS)     welts    Mold OTHER (SEE COMMENTS)     Coughing and sneezing    Pollen OTHER (SEE COMMENTS)     Coughing and sneezing    Vinegar HIVES    Radiology Contrast Iodinated Dyes RASH    Seasonal Runny nose    Sulfa Antibiotics ITCHING

## 2025-02-11 ENCOUNTER — APPOINTMENT (OUTPATIENT)
Dept: GENERAL RADIOLOGY | Facility: HOSPITAL | Age: 70
End: 2025-02-11
Attending: ORTHOPAEDIC SURGERY
Payer: MEDICARE

## 2025-02-11 ENCOUNTER — ANESTHESIA (OUTPATIENT)
Dept: SURGERY | Facility: HOSPITAL | Age: 70
End: 2025-02-11
Payer: MEDICARE

## 2025-02-11 ENCOUNTER — HOSPITAL ENCOUNTER (OUTPATIENT)
Facility: HOSPITAL | Age: 70
Discharge: HOME | End: 2025-02-12
Attending: ORTHOPAEDIC SURGERY | Admitting: ORTHOPAEDIC SURGERY
Payer: MEDICARE

## 2025-02-11 ENCOUNTER — ANESTHESIA EVENT (OUTPATIENT)
Dept: SURGERY | Facility: HOSPITAL | Age: 70
End: 2025-02-11
Payer: MEDICARE

## 2025-02-11 DIAGNOSIS — Z01.818 PREOP TESTING: Primary | ICD-10-CM

## 2025-02-11 PROBLEM — G47.33 OSA (OBSTRUCTIVE SLEEP APNEA): Status: ACTIVE | Noted: 2025-02-11

## 2025-02-11 PROBLEM — M19.012 PRIMARY OSTEOARTHRITIS OF LEFT SHOULDER: Status: ACTIVE | Noted: 2025-02-11

## 2025-02-11 PROCEDURE — 99222 1ST HOSP IP/OBS MODERATE 55: CPT | Performed by: HOSPITALIST

## 2025-02-11 PROCEDURE — 76942 ECHO GUIDE FOR BIOPSY: CPT | Performed by: ANESTHESIOLOGY

## 2025-02-11 PROCEDURE — 0RRK0JZ REPLACEMENT OF LEFT SHOULDER JOINT WITH SYNTHETIC SUBSTITUTE, OPEN APPROACH: ICD-10-PCS | Performed by: ORTHOPAEDIC SURGERY

## 2025-02-11 PROCEDURE — 0LS40ZZ REPOSITION LEFT UPPER ARM TENDON, OPEN APPROACH: ICD-10-PCS | Performed by: ORTHOPAEDIC SURGERY

## 2025-02-11 PROCEDURE — 73030 X-RAY EXAM OF SHOULDER: CPT | Performed by: ORTHOPAEDIC SURGERY

## 2025-02-11 DEVICE — CORTILOC® PEGGED GLENOID
Type: IMPLANTABLE DEVICE | Site: SHOULDER | Status: FUNCTIONAL
Brand: TORNIER PERFORM® ANATOMIC GLENOID

## 2025-02-11 DEVICE — ECLIPSE™ SPEEDSCAP™ IMPLANT SYSTEM
Type: IMPLANTABLE DEVICE | Site: SHOULDER | Status: FUNCTIONAL
Brand: ARTHREX®

## 2025-02-11 DEVICE — BONE ANCHORS 3 WITH ARTHROSCOPIC DELIVERY SYSTEM ADVANCED
Type: IMPLANTABLE DEVICE | Site: SHOULDER | Status: FUNCTIONAL
Brand: BONE ANCHORS WITH ARTHROSCOPIC DELIVERY SYSTEM - ADVANCED

## 2025-02-11 DEVICE — NUCLEUS
Type: IMPLANTABLE DEVICE | Site: SHOULDER | Status: FUNCTIONAL
Brand: TORNIER SIMPLICITI SHOULDER SYSTEM

## 2025-02-11 DEVICE — IMPLANTABLE DEVICE
Type: IMPLANTABLE DEVICE | Site: SHOULDER | Status: FUNCTIONAL
Brand: BIOINDUCTIVE IMPLANT WITH ARTHROSCOPIC DELIVERY SYSTEM - LARGE

## 2025-02-11 DEVICE — IMPLANTABLE DEVICE
Type: IMPLANTABLE DEVICE | Site: SHOULDER | Status: FUNCTIONAL
Brand: TORNIER SIMPLICITI® SHOULDER SYSTEM

## 2025-02-11 DEVICE — IMPLANTABLE DEVICE
Type: IMPLANTABLE DEVICE | Site: SHOULDER | Status: FUNCTIONAL
Brand: ROTATION MEDICAL TENDON STAPLES

## 2025-02-11 DEVICE — IMPLANTABLE DEVICE
Type: IMPLANTABLE DEVICE | Site: SHOULDER | Status: FUNCTIONAL
Brand: REFOBACIN® BONE CEMENT R

## 2025-02-11 RX ORDER — HYDROMORPHONE HYDROCHLORIDE 1 MG/ML
0.4 INJECTION, SOLUTION INTRAMUSCULAR; INTRAVENOUS; SUBCUTANEOUS EVERY 2 HOUR PRN
Status: DISCONTINUED | OUTPATIENT
Start: 2025-02-11 | End: 2025-02-12

## 2025-02-11 RX ORDER — LIDOCAINE HYDROCHLORIDE 10 MG/ML
INJECTION, SOLUTION EPIDURAL; INFILTRATION; INTRACAUDAL; PERINEURAL AS NEEDED
Status: DISCONTINUED | OUTPATIENT
Start: 2025-02-11 | End: 2025-02-11 | Stop reason: SURG

## 2025-02-11 RX ORDER — DOCUSATE SODIUM 100 MG/1
100 CAPSULE, LIQUID FILLED ORAL 2 TIMES DAILY
Status: DISCONTINUED | OUTPATIENT
Start: 2025-02-11 | End: 2025-02-12

## 2025-02-11 RX ORDER — VANCOMYCIN HYDROCHLORIDE 1 G/20ML
INJECTION, POWDER, LYOPHILIZED, FOR SOLUTION INTRAVENOUS AS NEEDED
Status: DISCONTINUED | OUTPATIENT
Start: 2025-02-11 | End: 2025-02-11 | Stop reason: HOSPADM

## 2025-02-11 RX ORDER — POLYETHYLENE GLYCOL 3350 17 G/17G
17 POWDER, FOR SOLUTION ORAL DAILY PRN
Status: DISCONTINUED | OUTPATIENT
Start: 2025-02-11 | End: 2025-02-12

## 2025-02-11 RX ORDER — METOCLOPRAMIDE 10 MG/1
10 TABLET ORAL ONCE
Status: COMPLETED | OUTPATIENT
Start: 2025-02-11 | End: 2025-02-11

## 2025-02-11 RX ORDER — NALOXONE HYDROCHLORIDE 0.4 MG/ML
0.08 INJECTION, SOLUTION INTRAMUSCULAR; INTRAVENOUS; SUBCUTANEOUS AS NEEDED
Status: DISCONTINUED | OUTPATIENT
Start: 2025-02-11 | End: 2025-02-11 | Stop reason: HOSPADM

## 2025-02-11 RX ORDER — HYDROMORPHONE HYDROCHLORIDE 1 MG/ML
0.4 INJECTION, SOLUTION INTRAMUSCULAR; INTRAVENOUS; SUBCUTANEOUS EVERY 5 MIN PRN
Status: DISCONTINUED | OUTPATIENT
Start: 2025-02-11 | End: 2025-02-11 | Stop reason: HOSPADM

## 2025-02-11 RX ORDER — METOCLOPRAMIDE HYDROCHLORIDE 5 MG/ML
10 INJECTION INTRAMUSCULAR; INTRAVENOUS ONCE
Status: COMPLETED | OUTPATIENT
Start: 2025-02-11 | End: 2025-02-11

## 2025-02-11 RX ORDER — SENNOSIDES 8.6 MG
17.2 TABLET ORAL NIGHTLY
Status: DISCONTINUED | OUTPATIENT
Start: 2025-02-11 | End: 2025-02-12

## 2025-02-11 RX ORDER — METOCLOPRAMIDE HYDROCHLORIDE 5 MG/ML
10 INJECTION INTRAMUSCULAR; INTRAVENOUS EVERY 8 HOURS PRN
Status: DISCONTINUED | OUTPATIENT
Start: 2025-02-11 | End: 2025-02-12

## 2025-02-11 RX ORDER — SODIUM CHLORIDE, SODIUM LACTATE, POTASSIUM CHLORIDE, CALCIUM CHLORIDE 600; 310; 30; 20 MG/100ML; MG/100ML; MG/100ML; MG/100ML
INJECTION, SOLUTION INTRAVENOUS CONTINUOUS
Status: DISCONTINUED | OUTPATIENT
Start: 2025-02-11 | End: 2025-02-12

## 2025-02-11 RX ORDER — ASPIRIN 81 MG/1
81 TABLET ORAL 2 TIMES DAILY
Status: DISCONTINUED | OUTPATIENT
Start: 2025-02-11 | End: 2025-02-12

## 2025-02-11 RX ORDER — HYDROMORPHONE HYDROCHLORIDE 1 MG/ML
0.8 INJECTION, SOLUTION INTRAMUSCULAR; INTRAVENOUS; SUBCUTANEOUS EVERY 2 HOUR PRN
Status: DISCONTINUED | OUTPATIENT
Start: 2025-02-11 | End: 2025-02-12

## 2025-02-11 RX ORDER — ACETAMINOPHEN 500 MG
1000 TABLET ORAL ONCE
Status: COMPLETED | OUTPATIENT
Start: 2025-02-11 | End: 2025-02-11

## 2025-02-11 RX ORDER — HYDROMORPHONE HYDROCHLORIDE 1 MG/ML
0.6 INJECTION, SOLUTION INTRAMUSCULAR; INTRAVENOUS; SUBCUTANEOUS EVERY 5 MIN PRN
Status: DISCONTINUED | OUTPATIENT
Start: 2025-02-11 | End: 2025-02-11 | Stop reason: HOSPADM

## 2025-02-11 RX ORDER — ROCURONIUM BROMIDE 10 MG/ML
INJECTION, SOLUTION INTRAVENOUS AS NEEDED
Status: DISCONTINUED | OUTPATIENT
Start: 2025-02-11 | End: 2025-02-11 | Stop reason: SURG

## 2025-02-11 RX ORDER — HYDROMORPHONE HYDROCHLORIDE 1 MG/ML
0.2 INJECTION, SOLUTION INTRAMUSCULAR; INTRAVENOUS; SUBCUTANEOUS EVERY 5 MIN PRN
Status: DISCONTINUED | OUTPATIENT
Start: 2025-02-11 | End: 2025-02-11 | Stop reason: HOSPADM

## 2025-02-11 RX ORDER — ACETAMINOPHEN 325 MG/1
650 TABLET ORAL
Status: DISCONTINUED | OUTPATIENT
Start: 2025-02-11 | End: 2025-02-12

## 2025-02-11 RX ORDER — MIDAZOLAM HYDROCHLORIDE 1 MG/ML
INJECTION INTRAMUSCULAR; INTRAVENOUS
Status: COMPLETED | OUTPATIENT
Start: 2025-02-11 | End: 2025-02-11

## 2025-02-11 RX ORDER — OXYCODONE HYDROCHLORIDE 5 MG/1
10 TABLET ORAL EVERY 4 HOURS PRN
Status: DISCONTINUED | OUTPATIENT
Start: 2025-02-11 | End: 2025-02-12

## 2025-02-11 RX ORDER — TRANEXAMIC ACID 10 MG/ML
INJECTION, SOLUTION INTRAVENOUS AS NEEDED
Status: DISCONTINUED | OUTPATIENT
Start: 2025-02-11 | End: 2025-02-11 | Stop reason: SURG

## 2025-02-11 RX ORDER — BISACODYL 10 MG
10 SUPPOSITORY, RECTAL RECTAL
Status: DISCONTINUED | OUTPATIENT
Start: 2025-02-11 | End: 2025-02-12

## 2025-02-11 RX ORDER — MORPHINE SULFATE 4 MG/ML
4 INJECTION, SOLUTION INTRAMUSCULAR; INTRAVENOUS EVERY 10 MIN PRN
Status: DISCONTINUED | OUTPATIENT
Start: 2025-02-11 | End: 2025-02-11 | Stop reason: HOSPADM

## 2025-02-11 RX ORDER — ONDANSETRON 2 MG/ML
4 INJECTION INTRAMUSCULAR; INTRAVENOUS EVERY 6 HOURS PRN
Status: DISCONTINUED | OUTPATIENT
Start: 2025-02-11 | End: 2025-02-12

## 2025-02-11 RX ORDER — DEXAMETHASONE SODIUM PHOSPHATE 4 MG/ML
VIAL (ML) INJECTION AS NEEDED
Status: DISCONTINUED | OUTPATIENT
Start: 2025-02-11 | End: 2025-02-11 | Stop reason: SURG

## 2025-02-11 RX ORDER — IBUPROFEN 600 MG/1
600 TABLET, FILM COATED ORAL EVERY 6 HOURS SCHEDULED
Status: DISCONTINUED | OUTPATIENT
Start: 2025-02-11 | End: 2025-02-12

## 2025-02-11 RX ORDER — TRAMADOL HYDROCHLORIDE 50 MG/1
50 TABLET ORAL EVERY 6 HOURS SCHEDULED
Status: DISCONTINUED | OUTPATIENT
Start: 2025-02-11 | End: 2025-02-12

## 2025-02-11 RX ORDER — SODIUM PHOSPHATE, DIBASIC AND SODIUM PHOSPHATE, MONOBASIC 7; 19 G/230ML; G/230ML
1 ENEMA RECTAL ONCE AS NEEDED
Status: DISCONTINUED | OUTPATIENT
Start: 2025-02-11 | End: 2025-02-12

## 2025-02-11 RX ORDER — FAMOTIDINE 10 MG/ML
20 INJECTION, SOLUTION INTRAVENOUS ONCE
Status: COMPLETED | OUTPATIENT
Start: 2025-02-11 | End: 2025-02-11

## 2025-02-11 RX ORDER — SODIUM CHLORIDE, SODIUM LACTATE, POTASSIUM CHLORIDE, CALCIUM CHLORIDE 600; 310; 30; 20 MG/100ML; MG/100ML; MG/100ML; MG/100ML
INJECTION, SOLUTION INTRAVENOUS CONTINUOUS
Status: DISCONTINUED | OUTPATIENT
Start: 2025-02-11 | End: 2025-02-11 | Stop reason: HOSPADM

## 2025-02-11 RX ORDER — MORPHINE SULFATE 10 MG/ML
6 INJECTION, SOLUTION INTRAMUSCULAR; INTRAVENOUS EVERY 10 MIN PRN
Status: DISCONTINUED | OUTPATIENT
Start: 2025-02-11 | End: 2025-02-11 | Stop reason: HOSPADM

## 2025-02-11 RX ORDER — FAMOTIDINE 20 MG/1
20 TABLET, FILM COATED ORAL ONCE
Status: COMPLETED | OUTPATIENT
Start: 2025-02-11 | End: 2025-02-11

## 2025-02-11 RX ORDER — ONDANSETRON 2 MG/ML
INJECTION INTRAMUSCULAR; INTRAVENOUS AS NEEDED
Status: DISCONTINUED | OUTPATIENT
Start: 2025-02-11 | End: 2025-02-11 | Stop reason: SURG

## 2025-02-11 RX ORDER — OXYCODONE HYDROCHLORIDE 5 MG/1
5 TABLET ORAL EVERY 4 HOURS PRN
Status: DISCONTINUED | OUTPATIENT
Start: 2025-02-11 | End: 2025-02-12

## 2025-02-11 RX ORDER — PHENYLEPHRINE HCL 10 MG/ML
VIAL (ML) INJECTION AS NEEDED
Status: DISCONTINUED | OUTPATIENT
Start: 2025-02-11 | End: 2025-02-11 | Stop reason: SURG

## 2025-02-11 RX ORDER — ATORVASTATIN CALCIUM 20 MG/1
20 TABLET, FILM COATED ORAL NIGHTLY
Status: DISCONTINUED | OUTPATIENT
Start: 2025-02-11 | End: 2025-02-12

## 2025-02-11 RX ORDER — LISINOPRIL 10 MG/1
10 TABLET ORAL DAILY
Status: DISCONTINUED | OUTPATIENT
Start: 2025-02-12 | End: 2025-02-12

## 2025-02-11 RX ORDER — MORPHINE SULFATE 4 MG/ML
2 INJECTION, SOLUTION INTRAMUSCULAR; INTRAVENOUS EVERY 10 MIN PRN
Status: DISCONTINUED | OUTPATIENT
Start: 2025-02-11 | End: 2025-02-11 | Stop reason: HOSPADM

## 2025-02-11 RX ADMIN — ONDANSETRON 4 MG: 2 INJECTION INTRAMUSCULAR; INTRAVENOUS at 08:44:00

## 2025-02-11 RX ADMIN — MIDAZOLAM HYDROCHLORIDE 2 MG: 1 INJECTION INTRAMUSCULAR; INTRAVENOUS at 07:25:00

## 2025-02-11 RX ADMIN — PHENYLEPHRINE HCL 100 MCG: 10 MG/ML VIAL (ML) INJECTION at 08:08:00

## 2025-02-11 RX ADMIN — PHENYLEPHRINE HCL 200 MCG: 10 MG/ML VIAL (ML) INJECTION at 09:07:00

## 2025-02-11 RX ADMIN — TRANEXAMIC ACID 1000 MG: 10 INJECTION, SOLUTION INTRAVENOUS at 08:33:00

## 2025-02-11 RX ADMIN — PHENYLEPHRINE HCL 100 MCG: 10 MG/ML VIAL (ML) INJECTION at 09:26:00

## 2025-02-11 RX ADMIN — TRANEXAMIC ACID 1000 MG: 10 INJECTION, SOLUTION INTRAVENOUS at 09:33:00

## 2025-02-11 RX ADMIN — PHENYLEPHRINE HCL 100 MCG: 10 MG/ML VIAL (ML) INJECTION at 08:30:00

## 2025-02-11 RX ADMIN — SODIUM CHLORIDE, SODIUM LACTATE, POTASSIUM CHLORIDE, CALCIUM CHLORIDE: 600; 310; 30; 20 INJECTION, SOLUTION INTRAVENOUS at 09:24:00

## 2025-02-11 RX ADMIN — PHENYLEPHRINE HCL 100 MCG: 10 MG/ML VIAL (ML) INJECTION at 09:41:00

## 2025-02-11 RX ADMIN — PHENYLEPHRINE HCL 100 MCG: 10 MG/ML VIAL (ML) INJECTION at 08:55:00

## 2025-02-11 RX ADMIN — TRANEXAMIC ACID: 10 INJECTION, SOLUTION INTRAVENOUS at 10:02:00

## 2025-02-11 RX ADMIN — DEXAMETHASONE SODIUM PHOSPHATE 8 MG: 4 MG/ML VIAL (ML) INJECTION at 08:44:00

## 2025-02-11 RX ADMIN — PHENYLEPHRINE HCL 100 MCG: 10 MG/ML VIAL (ML) INJECTION at 08:51:00

## 2025-02-11 RX ADMIN — PHENYLEPHRINE HCL 100 MCG: 10 MG/ML VIAL (ML) INJECTION at 09:25:00

## 2025-02-11 RX ADMIN — LIDOCAINE HYDROCHLORIDE 50 MG: 10 INJECTION, SOLUTION EPIDURAL; INFILTRATION; INTRACAUDAL; PERINEURAL at 08:08:00

## 2025-02-11 RX ADMIN — ROCURONIUM BROMIDE 20 MG: 10 INJECTION, SOLUTION INTRAVENOUS at 08:54:00

## 2025-02-11 NOTE — ANESTHESIA PREPROCEDURE EVALUATION
Anesthesia PreOp Note    HPI:     Clyde Castro is a 69 year old female who presents for preoperative consultation requested by: Leon Piña MD    Date of Surgery: 2025    Procedure(s):  Left anatomic total shoulder arthroplasty  Indication: Left shoulder osteoarthritis    Relevant Problems   No relevant active problems       NPO:  Last Liquid Consumption Date: 02/10/25  Last Liquid Consumption Time:   Last Solid Consumption Date: 02/10/25  Last Solid Consumption Time:   Last Liquid Consumption Date: 02/10/25          History Review:  Patient Active Problem List    Diagnosis Date Noted    Primary osteoarthritis of left hip 2024    Essential hypertension 2024    Hyperlipidemia 2024    Pre-op testing 2024     (aka Personal history of other malignant neoplasm of skin) 2014    Cherry angioma 2014    Solar lentigo 2014    Intradermal nevus 2014    Solar aging of skin 2014    Neoplasm of uncertain behavior of skin 2014    Family history of ischemic heart disease 10/20/2011       Past Medical History:    Anesthesia complication    heart rates drops in 30's, breathing issues    Aortic stenosis    valve    Back problem    Cancer (HCC)    uterine    Esophageal reflux    Glaucoma screening    sees Dr Thakkar    High blood pressure    High cholesterol    Hx of motion sickness    Muscle weakness    Neuropathy    Osteoarthrosis, unspecified whether generalized or localized, unspecified site    neck - hx C5-6 fx (on aleve)    PONV (postoperative nausea and vomiting)    Raynaud disease    Sleep apnea    CPAP    Visual impairment    readers       Past Surgical History:   Procedure Laterality Date    Appendectomy  1967      1992    Colonoscopy  1980    Eye surgery      Other surgical history  1980    R foot neuroma removal    Other surgical history  2004    L breast lumpectomy    Skin surgery  2014     [facility] for [facility] superfic to L superior nasal ala    Spine surgery procedure unlisted      cervical fusions       Prescriptions Prior to Admission[1]  Current Medications and Prescriptions Ordered in Epic[2]    Allergies[3]    Family History   Problem Relation Age of Onset    Heart Disorder Father 47        CAD/MI    Other Mother         kidney stones, hypothyroidism    Diabetes Mother     Other Sister 55        CVA    Obesity Sister     Hypertension Neg     Lipids Neg     Cancer Neg      Social History     Socioeconomic History    Marital status: Single   Tobacco Use    Smoking status: Former     Current packs/day: 1.00     Average packs/day: 1 pack/day for 7.0 years (7.0 ttl pk-yrs)     Types: Cigarettes    Smokeless tobacco: Never    Tobacco comments:     quit over 30 years ago   Vaping Use    Vaping status: Never Used   Substance and Sexual Activity    Alcohol use: Yes     Comment: 1 x weekly    Drug use: Not Currently       Available pre-op labs reviewed.             Vital Signs:  Body mass index is 37.02 kg/m².   height is 1.6 m (5' 3\") and weight is 94.8 kg (209 lb). Her oral temperature is 98.4 °F (36.9 °C). Her blood pressure is 140/74 and her pulse is 68. Her respiration is 16 and oxygen saturation is 97%.   Vitals:    02/05/25 1115 02/11/25 0658 02/11/25 0712   BP:  144/63 140/74   Pulse:  78 68   Resp:  14 16   Temp:  98.4 °F (36.9 °C)    TempSrc:  Oral    SpO2:  96% 97%   Weight: 95.3 kg (210 lb) 94.8 kg (209 lb)    Height: 1.6 m (5' 3\")          Anesthesia Evaluation     Patient summary reviewed    History of anesthetic complications (PONV)   Airway   Mallampati: II  TM distance: >3 FB  Neck ROM: limited  Dental - Dentition appears grossly intact     Pulmonary - normal exam   (+) sleep apnea on CPAP  Cardiovascular   (+) hypertension, valvular problems/murmurs AS, systolic click    ROS comment:  Echo 10/2024:    STUDY CONCLUSIONS  SUMMARY:    1. Left ventricle: The cavity size is normal. Wall thickness  is normal.  Systolic function is normal. The ejection fraction was measured by 3D  assessment. Doppler parameters are consistent with abnormal left  ventricular relaxation (grade 1 diastolic dysfunction). The ejection  fraction is 54%.  2. Aortic valve: The annulus is mildly calcified. The valve is trileaflet. The  leaflets are moderately thickened and moderately calcified. Velocity is  increased. There is moderate to severe stenosis. The mean systolic gradient  is 34mm Hg. The ratio of LVOT to aortic valve peak velocity is 0.19.  3. Right ventricle: The cavity size is normal. Systolic function is normal.  4. Tricuspid valve: There is mild regurgitation.  IMPRESSIONS: The study has changed since the previous study. Aortic stenosis  has progressed.      Denies new SOB or chest pain      Neuro/Psych      Comments: Hx of numbness in fingers    Hx of cervical fx s/p fusion    GI/Hepatic/Renal    (+) GERD (diet controlled)    Endo/Other    Abdominal   (+) obese                 Anesthesia Plan:   ASA:  3  Airway:  ETT and Video laryngoscope  Post-op Pain Management: Interscalene block  Informed Consent Plan and Risks Discussed With:  Patient      I have informed Clyde TOMAS Gloria and/or legal guardian or family member of the nature of the anesthetic plan, benefits, risks including possible dental damage if relevant, major complications, and any alternative forms of anesthetic management.   All of the patient's questions were answered to the best of my ability. The patient desires the anesthetic management as planned.  Stephen Oppenheim, MD  2/11/2025 7:15 AM  Present on Admission:  **None**           [1]   Medications Prior to Admission   Medication Sig Dispense Refill Last Dose/Taking    aspirin 325 MG Oral Tab Take 1 tablet (325 mg total) by mouth daily.   1/28/2025    atorvastatin 20 MG Oral Tab Take 1 tablet (20 mg total) by mouth nightly.   2/10/2025 at  9:00 PM    lisinopril 10 MG Oral Tab Take 1 tablet (10 mg total)  by mouth daily.   2/10/2025 at  8:00 AM    Naproxen Sodium (ALEVE) 220 MG Oral Tab  AS NEEDED   1/28/2025    Cholecalciferol ([employer] VIT D 5000 HIGH-POTENCY) 5000 UNIT Oral Cap 1 CAPSULE DAILY   Past Month   [2]   Current Facility-Administered Medications Ordered in Epic   Medication Dose Route Frequency Provider Last Rate Last Admin    lactated ringers infusion   Intravenous Continuous Leon Piña MD 20 mL/hr at 02/11/25 0658 New Bag at 02/11/25 0658    ceFAZolin (Ancef) 2g in 10mL IV syringe premix  2 g Intravenous Once Leon Piña MD         No current [facility]-ordered outpatient medications on file.   [3]   Allergies  Allergen Reactions    Apple SWELLING     Fresh apples, swelling in the mouth    Beer HIVES    Cat Hair Extract SHORTNESS OF BREATH and OTHER (SEE COMMENTS)     welts    Cherry SWELLING     swelling in the toungue    Iodine (Topical) HIVES    Meclizine OTHER (SEE COMMENTS)     Heart issues    Red Wine Extract HIVES    Shellfish HIVES    Benadryl [Diphenhydramine] OTHER (SEE COMMENTS)     Cardiac disturbances    Cyclobenzaprine OTHER (SEE COMMENTS)     Cardiac disturbances    Dander OTHER (SEE COMMENTS)     welts    Mold OTHER (SEE COMMENTS)     Coughing and sneezing    Pollen OTHER (SEE COMMENTS)     Coughing and sneezing    Vinegar HIVES    Radiology Contrast Iodinated Dyes RASH    Seasonal Runny nose    Sulfa Antibiotics ITCHING

## 2025-02-11 NOTE — CONSULTS
[facility]    PATIENT'S NAME: KAREN ROWE   ATTENDING PHYSICIAN: Leon Piña MD   CONSULTING PHYSICIAN: Dianna Klein MD   PATIENT ACCOUNT#:   [id number]    LOCATION:  [address]  MEDICAL RECORD #:   [id number]       YOB: 1955  ADMISSION DATE:       2025      CONSULT DATE:  2025    REPORT OF CONSULTATION    REASON FOR ADMISSION:  Post left total shoulder arthroplasty.    HISTORY OF PRESENT ILLNESS:  The patient is a 69-year-old  female with chronic left shoulder pain and underlying severe glenohumeral osteoarthritis, failed outpatient conservative medical management options, scheduled today for above-mentioned procedure by her orthopedic surgery, Dr. Piña.  Preoperatively had interscalene block.  Postoperatively transferred to PACU for further monitoring.    PAST MEDICAL HISTORY:  Osteoarthritis, hypertension, hyperlipidemia, obstructive sleep apnea, obesity, degenerative joint disease of cervical and lumbar spine, gastroesophageal reflux disease.    PAST SURGICAL HISTORY:  Appendectomy, , nose basal cell carcinoma resection, right foot neuroma resection, left breast lumpectomy, anterior cervical discectomy and fusion, left total hip arthroplasty.    MEDICATIONS:  Please see medication reconciliation list.     ALLERGIES:  Radiology contrast, sulfa, shellfish.  She has multiple food allergies and multiple medication side effects.    SOCIAL HISTORY:  Ex-tobacco user.  Social alcohol.  No drug use.  Independent for basic activities of daily living.    FAMILY HISTORY:  Father had coronary artery disease.    REVIEW OF SYSTEMS:  Currently resting in bed.  No left shoulder pain.  No chest pain.  No shortness of breath.  Other 12-point review of systems negative.      PHYSICAL EXAMINATION:    GENERAL:  Alert, oriented to time, place, and person, no acute distress.   VITAL SIGNS:  Temperature 97.8, pulse 69, respiratory rate 16, blood  pressure 105/60, pulse ox 97% on room air.  HEENT:  Atraumatic.  Oropharynx clear.  Dry mucous membranes.  Ears, nose normal.  Eyes:  Anicteric sclerae.  NECK:  Supple.  No lymphadenopathy.  Trachea midline.  Full range of motion.  LUNGS:  Clear to auscultation bilaterally.  Normal respiratory effort.  Symmetric breathing sounds.  HEART:  Regular rate and rhythm.  S1 and S2 auscultated.  No murmur.  ABDOMEN:  Soft, nondistended.  No tenderness.  Positive bowel sounds.  EXTREMITIES:  Left shoulder dressing.  Sling immobilizer.  NEUROLOGIC:  Decreased sensation and muscle movement left upper extremity post interscalene block.  No other focal findings.    ASSESSMENT AND PLAN:    1.   Left shoulder primary osteoarthritis status post left anatomic total shoulder arthroplasty, interscalene block.  Pain control.  Neurovascular checks.  DVT prophylaxis.  Physical and occupational therapy.  2.   Obstructive sleep apnea.  Apply obstructive sleep apnea protocol and monitor.  3.   Essential hypertension.  Continue home medications and monitor.  4.   Hyperlipidemia.  Continue home medications.    Dictated By Dianna Klein MD  d: 02/11/2025 11:25:47  t: 02/11/2025 11:38:13  Job [id number]  FB/    cc: Leon Piña MD

## 2025-02-11 NOTE — ANESTHESIA PROCEDURE NOTES
Peripheral Block    Date/Time: 2/11/2025 7:25 AM    Performed by: Oppenheim, Stephen, MD  Authorized by: Oppenheim, Stephen, MD      General Information and Staff    Start Time:  2/11/2025 7:25 AM  End Time:  2/11/2025 7:34 AM  Anesthesiologist:  Oppenheim, Stephen, MD  Performed by:  Anesthesiologist  Patient Location:  Other  Patient Location if Other: pre-op  Block Placement: Pre Induction  Site Identification: real time ultrasound guided and image stored and retrievable    Block site/laterality marked before start: site marked  Reason for Block: at surgeon's request and post-op pain management    Preanesthetic Checklist: 2 patient identifers, IV checked, risks and benefits discussed, monitors and equipment checked, pre-op evaluation, timeout performed, anesthesia consent and sterile technique used      Procedure Details    Patient Position:  Sitting  Prep: ChloraPrep    Monitoring:  Cardiac monitor  Block Type:  Interscalene  Injection Technique:  Single-shot    Needle    Needle Type:  Short-bevel  Needle Gauge:  22 G  Needle Length:  50 mm  Needle Localization:  Ultrasound guidance              Assessment    Injection Assessment:  Good spread noted, incremental injection, local visualized surrounding nerve on ultrasound, low pressure, negative aspiration for heme and no pain on injection  Heart Rate Change: No        Medications  2/11/2025 7:25 AM  midazolam (VERSED)injection 2mg/2ml - Intravenous   2 mg - 2/11/2025 7:25:00 AM  fentaNYL (SUBLIMAZE) injection 50mcg/ml - Intravenous   50 mcg - 2/11/2025 7:25:00 AM    Additional Comments

## 2025-02-11 NOTE — ANESTHESIA POSTPROCEDURE EVALUATION
Patient: Clyde Castro    Procedure Summary       Date: 02/11/25 Room / Location: [facility] MAIN OR [address] [place] [address] MAIN OR    Anesthesia Start: 0755 Anesthesia Stop: 1003    Procedure: Left anatomic total shoulder arthroplasty (Left: Shoulder) Diagnosis: (Left shoulder osteoarthritis)    Surgeons: Leon Piña MD Anesthesiologist: Oppenheim, Stephen, MD    Anesthesia Type: general ASA Status: 3            Anesthesia Type: general    Vitals Value Taken Time   /60 02/11/25 1114   Temp 97.8 °F (36.6 °C) 02/11/25 1101   Pulse 69 02/11/25 1114   Resp 16 02/11/25 1114   SpO2 97 % 02/11/25 1114       [facility] AN Post Evaluation:   Patient Evaluated in PACU  Patient Participation: complete - patient participated  Level of Consciousness: awake  Pain Management: adequateYes    Nausea/Vomiting: none  Cardiovascular Status: acceptable  Respiratory Status: acceptable  Postoperative Hydration acceptable      Stephen Oppenheim, MD  2/11/2025 2:05 PM

## 2025-02-11 NOTE — ANESTHESIA PROCEDURE NOTES
Airway  Date/Time: 2/11/2025 8:20 AM  Urgency: Elective      General Information and Staff    Patient location during procedure: OR  Anesthesiologist: Oppenheim, Stephen, MD  Performed: anesthesiologist   Performed by: Oppenheim, Stephen, MD  Authorized by: Oppenheim, Stephen, MD      Indications and Patient Condition  Indications for airway management: anesthesia  Sedation level: deep  Preoxygenated: yes  Patient position: sniffing  Mask difficulty assessment: 2 - vent by mask + OA or adjuvant +/- NMBA    Final Airway Details  Final airway type: endotracheal airway      Successful airway: ETT  Cuffed: yes   Successful intubation technique: Video laryngoscopy  Facilitating devices/methods: cricoid pressure and intubating stylet  Endotracheal tube insertion site: oral  Blade: GlideScope  Blade size: #3  ETT size (mm): 7.0    Cormack-Lehane Classification: grade IIA - partial view of glottis  Placement verified by: capnometry   Cuff volume (mL): 8  Measured from: teeth  ETT to teeth (cm): 22  Number of attempts at approach: 1  Ventilation between attempts: 2 hand mask  Number of other approaches attempted: 1    Other Attempts  Unsuccessful attempted endotracheal techniques: video laryngoscopy    Additional Comments  Unable to get good visualization with Lowe, switched to glidescope

## 2025-02-11 NOTE — H&P
Pre-op Diagnosis: Left shoulder osteoarthritis    The above referenced H&P was reviewed by Herminia Eckert PA-C on 2/11/2025, the patient was examined and no significant changes have occurred in the patient's condition since the H&P was performed.  I discussed with the patient and/or legal representative the potential benefits, risks and side effects of this procedure; the likelihood of the patient achieving goals; and potential problems that might occur during recuperation.  I discussed reasonable alternatives to the procedure, including risks, benefits and side effects related to the alternatives and risks related to not receiving this procedure.  We will proceed with procedure as planned.

## 2025-02-12 VITALS
SYSTOLIC BLOOD PRESSURE: 134 MMHG | HEIGHT: 63 IN | HEART RATE: 61 BPM | TEMPERATURE: 98 F | BODY MASS INDEX: 37.03 KG/M2 | RESPIRATION RATE: 20 BRPM | DIASTOLIC BLOOD PRESSURE: 95 MMHG | WEIGHT: 209 LBS | OXYGEN SATURATION: 97 %

## 2025-02-12 PROCEDURE — 99239 HOSP IP/OBS DSCHRG MGMT >30: CPT | Performed by: HOSPITALIST

## 2025-02-12 NOTE — RESPIRATORY THERAPY NOTE
CPAP/BIPAP EVALUATION: DONE  Patient does have a previous diagnosis of BRYANNA. Patient does routinely use CPAP/BIPAP device at home.    CPAP/BIPAP INITIATION.  Pt brought her own home CPAP machine. Pt will use her home CPAP machine during her stay at the hospital. Water was provided.

## 2025-02-12 NOTE — OCCUPATIONAL THERAPY NOTE
OCCUPATIONAL THERAPY EVALUATION - INPATIENT     Room Number: Room 8/Room 8-A  Evaluation Date: 2/12/2025  Type of Evaluation: Initial  Presenting Problem: LT TSA    Physician Order: IP Consult to Occupational Therapy  Reason for Therapy: ADL/IADL Dysfunction and Discharge Planning    OCCUPATIONAL THERAPY ASSESSMENT   Patient is a 69 year old female admitted 2/11/2025 for LT TSA.  Prior to admission, patient's baseline is I with ADls and functional mobility, retired , drives. Pt is retired. .  Patient is currently functioning below baseline with ADls d/t limits of surgical precautions    PLAN  Patient has been evaluated and presents with no skilled Occupational Therapy needs  at this time.  Patient will be discharged from Occupational Therapy services. Please re-order if a new functional limitation presents during this admission. Recommend 24 hour support upon initial discharge. ANticipte outpt OT in the future as directed by surgeon    OT Device Recommendations:  (may benefit from [facility])    OCCUPATIONAL THERAPY MEDICAL/SOCIAL HISTORY   Problem List  Principal Problem:    Primary osteoarthritis of left shoulder  Active Problems:    Essential hypertension    Hyperlipidemia    BRYANNA (obstructive sleep apnea)    HOME SITUATION  Type of Home: House  Home Layout: -- (2 CHARLEE no railing- uses door jam)  Lives With: Alone (17 y/o grddtr to stay for 3+ days upon discharge)  Toilet and Equipment: Standard height toilet (safety frame)  Shower/Tub and Equipment: Tub-shower combo  Occupation/Status: retired  Hand Dominance: Right  Drives: Yes      Prior Level of Verbena: Pt lives alone and was I with I/ADls and functional mobility    SUBJECTIVE  \"That's what my granddtr will be there for!\"    OCCUPATIONAL THERAPY EXAMINATION    OBJECTIVE  Precautions: Limb alert - left  Fall Risk: High fall risk    WEIGHT BEARING RESTRICTION  L Upper Extremity: Non-Weight Bearing    PAIN ASSESSMENT  Rating: -- (severe at times with  activity)  Location: LT shoulder  Management Techniques: Repositioning; Ice    RANGE OF MOTION   RT Upper extremity ROM is within functional limits     STRENGTH ASSESSMENT  RT Upper extremity strength is within functional limits     ACTIVITIES OF DAILY LIVING ASSESSMENT  AM-PAC ‘6-Clicks’ Inpatient Daily Activity Short Form  How much help from another person does the patient currently need…  -   Putting on and taking off regular lower body clothing?: A Little  -   Bathing (including washing, rinsing, drying)?: A Little  -   Toileting, which includes using toilet, bedpan or urinal? : A Little  -   Putting on and taking off regular upper body clothing?: A Lot  -   Taking care of personal grooming such as brushing teeth?: A Little  -   Eating meals?: A Little    AM-PAC Score:  Score: 17  Approx Degree of Impairment: 50.11%  Standardized Score (AM-PAC Scale): 37.26  CMS Modifier (G-Code): CK    FUNCTIONAL TRANSFER ASSESSMENT  Sit to Stand: Edge of Bed; Chair  Edge of Bed: Supervision  Chair: Supervision  Toilet Transfer: Supervision  Simulated Car Transfer: Supervision    FUNCTIONAL ADL ASSESSMENT  Feeding: set up  Grooming: Min A  UE self care: Mod A to manage pull over shirt and immobilizer  LE self care: Min A to don underwear, pants, and slip on shoes. Pt requires assist to completely pull LT side of pants over hips.    THERAPEUTIC EXERCISE  Pt returns demo of UE HEP performing AROM to LT hand and wrist, AAROM to LT elbow.     Skilled Therapy Provided: Pt received in bed; friend present for most of session, engaged in session. Pt demo good understanding and is able to \"\" CG to assist in managing immobilizer, pull over shirt. Pt chooses to use \"walking stick\" to increase stability. Provide CGA improving to SBA for BR mobility. Pt may benefit from large base quad cane.    EDUCATION PROVIDED  Patient Education : Role of Occupational Therapy; Plan of Care; Discharge Recommendations; DME Recommendations; Functional  Transfer Techniques; Fall Prevention; Weight Bear Status; Surgical Precautions; Posture/Positioning; Proper Body Mechanics (UE HEP)  Patient's Response to Education: Verbalized Understanding; Returned Demonstration  Family/Caregiver's Response to Education: Verbalized Understanding    The patient's Approx Degree of Impairment: 50.11% has been calculated based on documentation in the [facility] '6 clicks' Inpatient Daily Activity Short Form.  Research supports that patients with this level of impairment may benefit from LEXI however pt will have assist from grddtr and friend and demo good understanding of learned techniques/post op instruction.     Final disposition will be made by interdisciplinary medical team.    Patient End of Session: Up in chair;Needs met;Call light within reach;RN aware of session/findings;All patient questions and concerns addressed;Family present      Patient Evaluation Complexity Level:   Occupational Profile/Medical History LOW - Brief history including review of medical or therapy records    Specific performance deficits impacting engagement in ADL/IADL LOW  1 - 3 performance deficits    Client Assessment/Performance Deficits LOW - No comorbidities nor modifications of tasks    Clinical Decision Making LOW - Analysis of occupational profile, problem-focused assessments, limited treatment options    Overall Complexity LOW     Self-Care Home Management: 30 minutes  Therapeutic Activity: 15 minutes

## 2025-02-13 ENCOUNTER — HOSPITAL ENCOUNTER (INPATIENT)
Facility: HOSPITAL | Age: 70
LOS: 6 days | Discharge: NURSING, REHAB OR LONG-TERM CARE | End: 2025-02-19
Attending: EMERGENCY MEDICINE | Admitting: HOSPITALIST
Payer: MEDICARE

## 2025-02-13 ENCOUNTER — APPOINTMENT (OUTPATIENT)
Dept: GENERAL RADIOLOGY | Facility: HOSPITAL | Age: 70
End: 2025-02-13
Attending: EMERGENCY MEDICINE
Payer: MEDICARE

## 2025-02-13 DIAGNOSIS — S42.292A CLOSED FRACTURE OF HEAD OF LEFT HUMERUS, INITIAL ENCOUNTER: Primary | ICD-10-CM

## 2025-02-13 DIAGNOSIS — D64.9 ANEMIA, UNSPECIFIED TYPE: ICD-10-CM

## 2025-02-13 DIAGNOSIS — Z96.612 STATUS POST TOTAL REPLACEMENT OF LEFT SHOULDER: ICD-10-CM

## 2025-02-13 DIAGNOSIS — S42.92XA SHOULDER FRACTURE, LEFT: ICD-10-CM

## 2025-02-13 LAB
ALBUMIN SERPL-MCNC: 4 G/DL (ref 3.2–4.8)
ALBUMIN/GLOB SERPL: 1.7 {RATIO} (ref 1–2)
ALP LIVER SERPL-CCNC: 61 U/L
ALT SERPL-CCNC: 15 U/L
ANION GAP SERPL CALC-SCNC: 7 MMOL/L (ref 0–18)
AST SERPL-CCNC: 17 U/L (ref ?–34)
BASOPHILS # BLD AUTO: 0.06 X10(3) UL (ref 0–0.2)
BASOPHILS NFR BLD AUTO: 0.5 %
BILIRUB SERPL-MCNC: 0.4 MG/DL (ref 0.2–1.1)
BUN BLD-MCNC: 23 MG/DL (ref 9–23)
BUN/CREAT SERPL: 31.5 (ref 10–20)
CALCIUM BLD-MCNC: 8.7 MG/DL (ref 8.7–10.4)
CHLORIDE SERPL-SCNC: 101 MMOL/L (ref 98–112)
CO2 SERPL-SCNC: 26 MMOL/L (ref 21–32)
CREAT BLD-MCNC: 0.73 MG/DL
DEPRECATED RDW RBC AUTO: 41.8 FL (ref 35.1–46.3)
EGFRCR SERPLBLD CKD-EPI 2021: 89 ML/MIN/1.73M2 (ref 60–?)
EOSINOPHIL # BLD AUTO: 0.06 X10(3) UL (ref 0–0.7)
EOSINOPHIL NFR BLD AUTO: 0.5 %
ERYTHROCYTE [DISTWIDTH] IN BLOOD BY AUTOMATED COUNT: 13.5 % (ref 11–15)
GLOBULIN PLAS-MCNC: 2.3 G/DL (ref 2–3.5)
GLUCOSE BLD-MCNC: 94 MG/DL (ref 70–99)
HCT VFR BLD AUTO: 33.1 %
HGB BLD-MCNC: 11.3 G/DL
IMM GRANULOCYTES # BLD AUTO: 0.04 X10(3) UL (ref 0–1)
IMM GRANULOCYTES NFR BLD: 0.3 %
LYMPHOCYTES # BLD AUTO: 1.61 X10(3) UL (ref 1–4)
LYMPHOCYTES NFR BLD AUTO: 12.9 %
MAGNESIUM SERPL-MCNC: 1.8 MG/DL (ref 1.6–2.6)
MCH RBC QN AUTO: 29 PG (ref 26–34)
MCHC RBC AUTO-ENTMCNC: 34.1 G/DL (ref 31–37)
MCV RBC AUTO: 84.9 FL
MONOCYTES # BLD AUTO: 1.59 X10(3) UL (ref 0.1–1)
MONOCYTES NFR BLD AUTO: 12.8 %
NEUTROPHILS # BLD AUTO: 9.1 X10 (3) UL (ref 1.5–7.7)
NEUTROPHILS # BLD AUTO: 9.1 X10(3) UL (ref 1.5–7.7)
NEUTROPHILS NFR BLD AUTO: 73 %
OSMOLALITY SERPL CALC.SUM OF ELEC: 281 MOSM/KG (ref 275–295)
PLATELET # BLD AUTO: 229 10(3)UL (ref 150–450)
POTASSIUM SERPL-SCNC: 4 MMOL/L (ref 3.5–5.1)
PROT SERPL-MCNC: 6.3 G/DL (ref 5.7–8.2)
RBC # BLD AUTO: 3.9 X10(6)UL
SODIUM SERPL-SCNC: 134 MMOL/L (ref 136–145)
WBC # BLD AUTO: 12.5 X10(3) UL (ref 4–11)

## 2025-02-13 PROCEDURE — 99222 1ST HOSP IP/OBS MODERATE 55: CPT | Performed by: HOSPITALIST

## 2025-02-13 PROCEDURE — 73030 X-RAY EXAM OF SHOULDER: CPT | Performed by: EMERGENCY MEDICINE

## 2025-02-13 RX ORDER — HYDROCODONE BITARTRATE AND ACETAMINOPHEN 5; 325 MG/1; MG/1
1 TABLET ORAL EVERY 4 HOURS PRN
Status: DISCONTINUED | OUTPATIENT
Start: 2025-02-13 | End: 2025-02-19

## 2025-02-13 RX ORDER — SODIUM PHOSPHATE, DIBASIC AND SODIUM PHOSPHATE, MONOBASIC 7; 19 G/230ML; G/230ML
1 ENEMA RECTAL ONCE AS NEEDED
Status: DISCONTINUED | OUTPATIENT
Start: 2025-02-13 | End: 2025-02-19

## 2025-02-13 RX ORDER — MORPHINE SULFATE 2 MG/ML
2 INJECTION, SOLUTION INTRAMUSCULAR; INTRAVENOUS EVERY 2 HOUR PRN
Status: DISCONTINUED | OUTPATIENT
Start: 2025-02-13 | End: 2025-02-18

## 2025-02-13 RX ORDER — POLYETHYLENE GLYCOL 3350 17 G/17G
17 POWDER, FOR SOLUTION ORAL DAILY PRN
Status: DISCONTINUED | OUTPATIENT
Start: 2025-02-13 | End: 2025-02-18

## 2025-02-13 RX ORDER — HYDROCODONE BITARTRATE AND ACETAMINOPHEN 5; 325 MG/1; MG/1
2 TABLET ORAL EVERY 4 HOURS PRN
Status: DISCONTINUED | OUTPATIENT
Start: 2025-02-13 | End: 2025-02-19

## 2025-02-13 RX ORDER — ACETAMINOPHEN 500 MG
500 TABLET ORAL EVERY 4 HOURS PRN
Status: DISCONTINUED | OUTPATIENT
Start: 2025-02-13 | End: 2025-02-19

## 2025-02-13 RX ORDER — ASPIRIN 81 MG/1
81 TABLET ORAL 2 TIMES DAILY
COMMUNITY

## 2025-02-13 RX ORDER — BISACODYL 10 MG
10 SUPPOSITORY, RECTAL RECTAL
Status: DISCONTINUED | OUTPATIENT
Start: 2025-02-13 | End: 2025-02-18

## 2025-02-13 RX ORDER — HYDROCODONE BITARTRATE AND ACETAMINOPHEN 5; 325 MG/1; MG/1
1 TABLET ORAL
COMMUNITY
Start: 2025-02-05

## 2025-02-13 RX ORDER — MORPHINE SULFATE 2 MG/ML
1 INJECTION, SOLUTION INTRAMUSCULAR; INTRAVENOUS EVERY 2 HOUR PRN
Status: DISCONTINUED | OUTPATIENT
Start: 2025-02-13 | End: 2025-02-18

## 2025-02-13 RX ORDER — DOXYCYCLINE 100 MG/1
100 TABLET ORAL 2 TIMES DAILY
COMMUNITY
Start: 2025-02-05

## 2025-02-13 RX ORDER — DOXYCYCLINE 100 MG/1
100 CAPSULE ORAL 2 TIMES DAILY
Status: DISCONTINUED | OUTPATIENT
Start: 2025-02-13 | End: 2025-02-19

## 2025-02-13 RX ORDER — HYDROCODONE BITARTRATE AND ACETAMINOPHEN 5; 325 MG/1; MG/1
1 TABLET ORAL ONCE
Status: COMPLETED | OUTPATIENT
Start: 2025-02-13 | End: 2025-02-13

## 2025-02-13 RX ORDER — MORPHINE SULFATE 4 MG/ML
4 INJECTION, SOLUTION INTRAMUSCULAR; INTRAVENOUS EVERY 2 HOUR PRN
Status: DISCONTINUED | OUTPATIENT
Start: 2025-02-13 | End: 2025-02-18

## 2025-02-13 RX ORDER — ONDANSETRON 2 MG/ML
4 INJECTION INTRAMUSCULAR; INTRAVENOUS EVERY 6 HOURS PRN
Status: DISCONTINUED | OUTPATIENT
Start: 2025-02-13 | End: 2025-02-18

## 2025-02-13 RX ORDER — ECHINACEA PURPUREA EXTRACT 125 MG
1 TABLET ORAL
Status: DISCONTINUED | OUTPATIENT
Start: 2025-02-13 | End: 2025-02-19

## 2025-02-13 RX ORDER — METOCLOPRAMIDE HYDROCHLORIDE 5 MG/ML
10 INJECTION INTRAMUSCULAR; INTRAVENOUS EVERY 8 HOURS PRN
Status: DISCONTINUED | OUTPATIENT
Start: 2025-02-13 | End: 2025-02-18

## 2025-02-13 RX ORDER — SENNA AND DOCUSATE SODIUM 50; 8.6 MG/1; MG/1
2 TABLET, FILM COATED ORAL DAILY
Status: DISCONTINUED | OUTPATIENT
Start: 2025-02-13 | End: 2025-02-18

## 2025-02-13 RX ORDER — ACETAMINOPHEN 325 MG/1
650 TABLET ORAL EVERY 4 HOURS PRN
Status: DISCONTINUED | OUTPATIENT
Start: 2025-02-13 | End: 2025-02-19

## 2025-02-13 RX ORDER — HEPARIN SODIUM 5000 [USP'U]/ML
5000 INJECTION, SOLUTION INTRAVENOUS; SUBCUTANEOUS EVERY 12 HOURS SCHEDULED
Status: DISCONTINUED | OUTPATIENT
Start: 2025-02-13 | End: 2025-02-18

## 2025-02-13 NOTE — H&P
[facility]  part of [facility]    History & Physical    Clyde Castro Patient Status:  Emergency    1955 MRN [id number]   Location [facility] EMERGENCY DEPARTMENT Attending Cara Franks MD   Hosp Day # 0 PCP WILLIAM HULESCH     Date:  2025  Date of Admission:  2025    History provided by:patient  Chief Complaint:     Chief Complaint   Patient presents with    Fall    Arm or Hand Injury       HPI:   Clyde Castro is a(n) 69 year old female just discharged yesterday after uneventful left total shoulder arthroplasty with Dr. Piña, came back after mechanical fall at home on her left side. Found to have a periprosthetic fracture.  C/o severe pains, otherwise comfortable, no tingling or numbness in hand.  No dizziness, no CP, no dyspnea.     History     Past Medical History:    Anesthesia complication    heart rates drops in 30's, breathing issues    Aortic stenosis    valve    Back problem    Cancer (HCC)    uterine    Esophageal reflux    Glaucoma screening    sees Dr Thakkar    High blood pressure    High cholesterol    Hx of motion sickness    Muscle weakness    Neuropathy    Osteoarthrosis, unspecified whether generalized or localized, unspecified site    neck - hx C5-6 fx (on aleve)    PONV (postoperative nausea and vomiting)    Raynaud disease    Sleep apnea    CPAP    Visual impairment    readers     Past Surgical History:   Procedure Laterality Date    Appendectomy  1967      1992    Colonoscopy  1980    Eye surgery      Other surgical history  1980    R foot neuroma removal    Other surgical history  2004    L breast lumpectomy    Skin surgery  2014    MMS for BCC superfic to L superior nasal ala    Spine surgery procedure unlisted      cervical fusions     Family History   Problem Relation Age of Onset    Heart Disorder Father 47        CAD/MI    Other Mother         kidney stones, hypothyroidism    Diabetes  Mother     Other Sister 55        CVA    Obesity Sister     Hypertension Neg     Lipids Neg     Cancer Neg      Social History:  Social History     Socioeconomic History    Marital status: Single   Tobacco Use    Smoking status: Former     Current packs/day: 1.00     Average packs/day: 1 pack/day for 7.0 years (7.0 ttl pk-yrs)     Types: Cigarettes    Smokeless tobacco: Never    Tobacco comments:     quit over 30 years ago   Vaping Use    Vaping status: Never Used   Substance and Sexual Activity    Alcohol use: Yes     Comment: 1 x weekly    Drug use: Not Currently     Social Drivers of Health     Food Insecurity: No Food Insecurity (2/11/2025)    NCSS - Food Insecurity     Worried About Running Out of Food in the Last Year: No     Ran Out of Food in the Last Year: No   Transportation Needs: No Transportation Needs (2/11/2025)    NCSS - Transportation     Lack of Transportation: No   Housing Stability: Not At Risk (2/11/2025)    NCSS - Housing/Utilities     Has Housing: Yes     Worried About Losing Housing: No     Unable to Get Utilities: No     Allergies/Medications:   Allergies: Allergies[1]  (Not in a hospital admission)      Review of Systems:   Pertinent items are noted in HPI.      Physical Exam:   Vital Signs:  Blood pressure 110/66, pulse 68, temperature 98.6 °F (37 °C), temperature source Oral, resp. rate 20, height 5' 3\" (1.6 m), weight 210 lb (95.3 kg), SpO2 96%.     General:  Alert and oriented.  Diffuse skin problem:  None.  Eye:  Pupils are equal, round and reactive to light, extraocular movements are intact, Normal conjunctiva.  HENT:  Normocephalic, oral mucosa is moist.  Head:  Normocephalic, atraumatic.  Neck:  Supple, non-tender  Respiratory:  Lungs are clear to auscultation, respirations are non-labored, breath sounds are equal, symmetrical chest wall expansion.  Cardiovascular:  Normal rate, regular rhythm, +loud murmur, no edema.  Gastrointestinal:  Soft, non-tender, non-distended, normal bowel  sounds, no organomegaly.  Musculoskeletal: Normal range of motion.  normal strength. Except LUE in sling  Feet:  Normal pulses.  Neurologic:  Alert, oriented, no focal deficits, cranial nerves II-XII are grossly intact.  Cognition and Speech:  Oriented, speech clear and coherent.  Psychiatric:  Cooperative, appropriate mood & affect.    Cervical Papanicolaou contraindicated    Results:   No results found for: \"WBC\", \"HGB\", \"HCT\", \"PLT\", \"CREATSERUM\", \"BUN\", \"NA\", \"K\", \"CL\", \"CO2\", \"GLU\", \"CA\", \"ALB\", \"ALKPHO\", \"BILT\", \"TP\", \"AST\", \"ALT\", \"PTT\", \"INR\", \"PT\", \"T4F\", \"TSH\", \"TSHREFLEX\", \"JESSE\", \"LIP\", \"GGT\", \"PSA\", \"DDIMER\", \"ESRML\", \"ESRPF\", \"CRP\", \"BNP\", \"MG\", \"PHOS\", \"TROP\", \"CK\", \"CKMB\", \"JAZMYNE\", \"RPR\", \"B12\", \"ETOH\", \"POCGLU\"    XR SHOULDER, COMPLETE (MIN 2 VIEWS), LEFT (CPT=73030)    Result Date: 2/13/2025  CONCLUSION:  Acute displaced periprosthetic left humeral head fracture.    Dictated by (CST): Meir Mcclelland MD on 2/13/2025 at 8:35 AM     Finalized by (CST): Meir Mcclelland MD on 2/13/2025 at 8:37 AM          XR SHOULDER, COMPLETE (MIN 2 VIEWS), LEFT (CPT=73030)    Result Date: 2/11/2025  CONCLUSION:   Postsurgical changes of left shoulder arthroplasty with radiographically uncomplicated hardware.    elm-remote  Dictated by (CST): Lai Boland MD on 2/11/2025 at 11:42 AM     Finalized by (CST): Lai Boland MD on 2/11/2025 at 11:42 AM               Assessment/Plan:       Closed fracture of head of left humerus, initial encounter  H/o uneventful L shoulder total arthroplasty on 2/11/25  -plans for OR on Sunday  -pain control  -monitor hgb  -bowel protocol  -DVT proph with heparin subcutaneous  -hold naproxen and ASA for now  -cont proph doxy (from recent surgery)    BRYANNA, per protocol  HTN, wnl, monitor  HL, on statins  Reflux     H/o severe aortic stenosis, asymptomatic  Follows with Dr. Marino at [facility]  Previous stress test ok  -avoid hypotension  -otherwise, pt tolerated  previous surgery well, ok to proceed  -tele perioperatively     Dispo: pending progress, will need to reeval post surgery with PT/OT, however suspect she would benefit from rehab.              Chart reviewed, including current admission vitals, notes, labs and imaging  Pertinent past medical records reviewed  Labs ordered and medications adjusted as outlined above  Coordinate care with care team/consultants  Discussed with patient  at bedside available results of tests, management plan as outlined above, and the need for hospitalization  D/w RN     MDM moderate  severe acute illness/or exacerbation of chronic illness posing a threat to life, IV medication, requiring close monitoring in hospital.    KERRI HUFFMAN MD  2/13/2025    **Certification      PHYSICIAN Certification of Need for Inpatient Hospitalization - Initial Certification    Patient will require inpatient services that will reasonably be expected to span two midnight's based on the clinical documentation in H+P.   Based on patients current state of illness, I anticipate that, after discharge, patient will require TBD.         [1]   Allergies  Allergen Reactions    Apple SWELLING     Fresh apples, swelling in the mouth    Beer HIVES    Cat Hair Extract SHORTNESS OF BREATH and OTHER (SEE COMMENTS)     welts    Cherry SWELLING     swelling in the toungue    Ibuprofen BLEEDING     Causes severe bruising     Iodine (Topical) HIVES    Meclizine OTHER (SEE COMMENTS)     Heart issues    Red Wine Extract HIVES    Shellfish HIVES    Benadryl [Diphenhydramine] OTHER (SEE COMMENTS)     Cardiac disturbances    Cyclobenzaprine OTHER (SEE COMMENTS)     Cardiac disturbances    Dander OTHER (SEE COMMENTS)     welts    Mold OTHER (SEE COMMENTS)     Coughing and sneezing    Pollen OTHER (SEE COMMENTS)     Coughing and sneezing    Vinegar HIVES    Radiology Contrast Iodinated Dyes RASH    Seasonal Runny nose    Sulfa Antibiotics ITCHING

## 2025-02-13 NOTE — PROGRESS NOTES
[facility]  part of [facility]    Progress Note    Clyde Castro Patient Status:  Emergency    1955 MRN [id number]   Location [facility] EMERGENCY DEPARTMENT Attending Cara Franks MD   Hosp Day # 0 PCP WILLIAM HULESCH     Chief Complaint: Left shoulder pain after fall     Subjective:   69 year old female presents today for left shoulder pain after fall. She had an anatomic TSA on . She states early this morning, she was going to get medication when she fell in her kitchen and landed directly on her left shoulder. She presented to the ER after, where Xrs revealed she suffered a periprosthetic fracture involving her humeral head. She locates the pain diffusely throughout her shoulder. She denies new numbness or tingling into her left hand. She denies difficulty with motor movements.      Objective:   Blood pressure 102/59, pulse 78, temperature 98.6 °F (37 °C), temperature source Oral, resp. rate 20, height 5' 3\" (1.6 m), weight 210 lb (95.3 kg), SpO2 94%.    Physical Exam  Examination of the left shoulder reveals limited range of motion with expected stiffness. Motor function and sensation of the axillary nerve intact. Expected post operative swelling and ecchymosis present. Incision is bandaged with clean and dry bandages.     Results:   Lab Results   Component Value Date    WBC 12.5 (H) 2025    HGB 11.3 (L) 2025    HCT 33.1 (L) 2025    .0 2025    CREATSERUM 0.73 2025    BUN 23 2025     (L) 2025    K 4.0 2025     2025    CO2 26.0 2025    GLU 94 2025    CA 8.7 2025    ALB 4.0 2025    ALKPHO 61 2025    BILT 0.4 2025    TP 6.3 2025    AST 17 2025    ALT 15 2025       XR SHOULDER, COMPLETE (MIN 2 VIEWS), LEFT (CPT=73030)    Result Date: 2025  CONCLUSION:  Acute displaced periprosthetic left humeral head fracture.    Dictated by (CST):  Meir Mcclelland MD on 2/13/2025 at 8:35 AM     Finalized by (CST): Meir Mcclelland MD on 2/13/2025 at 8:37 AM               Assessment & Plan:   Assessment: Closed periprosthetic fracture of head of left humerus     Plan:   - plan for left reverse total shoulder arthroplasty on Sunday  - defer to hospitalist for admission and orders   - continue pain medication as needed     Sara Karimi PA-C  2/13/2025

## 2025-02-13 NOTE — ED PROVIDER NOTES
Patient Seen in: [facility] Emergency Department      History     Chief Complaint   Patient presents with    Fall    Arm or Hand Injury     Stated Complaint: Fall left shoulder injury    Subjective:   HPI      69-year-old female who had left shoulder replacement surgery on 2025 presents for evaluation after a fall.  Patient reports while standing in her kitchen she slipped and fell onto her left side striking her left shoulder.  Reports hearing/feeling a crunching sensation in her left shoulder.  No having pain in her left shoulder.  Did not hit her head, no LOC.  No other complaints of pain.  No new weakness or numbness.    Objective:     Past Medical History:    Anesthesia complication    heart rates drops in 30's, breathing issues    Aortic stenosis    valve    Back problem    Cancer (HCC)    uterine    Esophageal reflux    Glaucoma screening    sees Dr Thakkar    High blood pressure    High cholesterol    Hx of motion sickness    Muscle weakness    Neuropathy    Osteoarthrosis, unspecified whether generalized or localized, unspecified site    neck - hx C5-6 fx (on aleve)    PONV (postoperative nausea and vomiting)    Raynaud disease    Sleep apnea    CPAP    Visual impairment    readers              Past Surgical History:   Procedure Laterality Date    Appendectomy  1967      1992    Colonoscopy  1980    Eye surgery      Other surgical history  1980    R foot neuroma removal    Other surgical history  2004    L breast lumpectomy    Skin surgery  2014    MMS for BCC superfic to L superior nasal ala    Spine surgery procedure unlisted      cervical fusions                Social History     Socioeconomic History    Marital status: Single   Tobacco Use    Smoking status: Former     Current packs/day: 1.00     Average packs/day: 1 pack/day for 7.0 years (7.0 ttl pk-yrs)     Types: Cigarettes    Smokeless tobacco: Never    Tobacco comments:     quit over 30 years  ago   Vaping Use    Vaping status: Never Used   Substance and Sexual Activity    Alcohol use: Yes     Comment: 1 x weekly    Drug use: Not Currently     Social Drivers of Health     Food Insecurity: No Food Insecurity (2/11/2025)    NCSS - Food Insecurity     Worried About Running Out of Food in the Last Year: No     Ran Out of Food in the Last Year: No   Transportation Needs: No Transportation Needs (2/11/2025)    NCSS - Transportation     Lack of Transportation: No   Housing Stability: Not At Risk (2/11/2025)    NCSS - Housing/Utilities     Has Housing: Yes     Worried About Losing Housing: No     Unable to Get Utilities: No                  Physical Exam     ED Triage Vitals [02/13/25 0720]   /66   Pulse 79   Resp 20   Temp 98.6 °F (37 °C)   Temp src Oral   SpO2 97 %   O2 Device None (Room air)       Current Vitals:   Vital Signs  BP: 102/59  Pulse: 78  Resp: 20  Temp: 98.6 °F (37 °C)  Temp src: Oral  MAP (mmHg): 72    Oxygen Therapy  SpO2: 94 %  O2 Device: None (Room air)        Physical Exam  Vitals and nursing note reviewed.   Constitutional:       General: She is not in acute distress.     Appearance: Normal appearance. She is not ill-appearing or toxic-appearing.   HENT:      Head: Normocephalic and atraumatic.   Eyes:      Conjunctiva/sclera: Conjunctivae normal.   Cardiovascular:      Rate and Rhythm: Normal rate.   Pulmonary:      Effort: Pulmonary effort is normal. No respiratory distress.   Musculoskeletal:      Cervical back: Normal range of motion. No rigidity.      Comments: left arm immobilized by postop sling, able to move left fingers, range of motion otherwise intact   Neurological:      General: No focal deficit present.      Mental Status: She is alert.      Sensory: No sensory deficit.      Motor: No weakness.   Psychiatric:         Mood and Affect: Mood normal.             ED Course     Labs Reviewed   CBC WITH DIFFERENTIAL WITH PLATELET - Abnormal; Notable for the following components:        Result Value    WBC 12.5 (*)     HGB 11.3 (*)     HCT 33.1 (*)     Neutrophil Absolute Prelim 9.10 (*)     Neutrophil Absolute 9.10 (*)     Monocyte Absolute 1.59 (*)     All other components within normal limits   COMP METABOLIC PANEL (14) - Abnormal; Notable for the following components:    Sodium 134 (*)     BUN/CREA Ratio 31.5 (*)     All other components within normal limits   RAINBOW DRAW LAVENDER   RAINBOW DRAW LIGHT GREEN   RAINBOW DRAW BLUE            Imaging Results Available and Reviewed while in ED:   XR SHOULDER, COMPLETE (MIN 2 VIEWS), LEFT (CPT=73030)   Final Result   PROCEDURE: XR SHOULDER, COMPLETE (MIN 2 VIEWS), LEFT (CPT=73030)       COMPARISON: [facility], XR SHOULDER, COMPLETE (MIN 2    VIEWS), LEFT (CPT=73030), 2/11/2025, 10:18 AM.       INDICATIONS: History of a left total shoulder arthroplasty presents with    left shoulder pain post fall.       TECHNIQUE: 3 views were obtained.         FINDINGS:    BONES: Postoperative changes are again noted from a previous left total    shoulder arthroplasty.  There has been development of a periprosthetic    fracture involving the greater tuberosity.  There is resulting 1.0 cm    medial and 2.2 cm anterior displacement    of the humeral shaft with posterior angulation.  There is no associated    dislocation.  There is stable mild left acromioclavicular joint    osteoarthritis.   SOFT TISSUES: Negative. No visible soft tissue swelling.    EFFUSION: None visible.    OTHER: Postoperative changes are again noted from a multilevel lower    cervical disc fusion.                   =====   CONCLUSION:    Acute displaced periprosthetic left humeral head fracture.               Dictated by (CST): Meir Mcclelland MD on 2/13/2025 at 8:35 AM        Finalized by (CST): Meir Mcclelland MD on 2/13/2025 at 8:37 AM                   ED Medications Administered:   Medications   HYDROcodone-acetaminophen (Norco) 5-325 MG per tab 1 tablet (1 tablet  Oral Given 2/13/25 0725)   HYDROcodone-acetaminophen (Norco) 5-325 MG per tab 1 tablet (1 tablet Oral Given 2/13/25 1128)           Vitals:    02/13/25 0830 02/13/25 0930 02/13/25 1000 02/13/25 1030   BP: 110/66 113/59 101/57 102/59   Pulse: 68 73 71 78   Resp:       Temp:       TempSrc:       SpO2: 96% 96% 95% 94%   Weight:       Height:         *I personally reviewed and interpreted all ED vitals.         MDM          Admission disposition: 2/13/2025  9:16 AM           Medical Decision Making  Differential diagnosis includes but is not limited to fracture, dislocation, strain, sprain, ligament or tendon injury    Imaging as above, patient with normal neurovascular exam.  Discussed with Dr. Piña, agrees with plan for admission.  Discussed with and admitted to hospitalist.    Problems Addressed:  Closed fracture of head of left humerus, initial encounter: acute illness or injury  Status post total replacement of left shoulder: acute illness or injury    Amount and/or Complexity of Data Reviewed  Independent Historian: EMS  External Data Reviewed: labs.     Details: CBC from 1/20/2025 reviewed, hemoglobin 14.4 at that time, today 11.3  Labs: ordered.  Radiology: ordered and independent interpretation performed.     Details: X-ray image reviewed, fracture and dislocation noted  Discussion of management or test interpretation with external provider(s): Discussed with hospitalist and orthopedic surgery        Disposition and Plan     Clinical Impression:  1. Closed fracture of head of left humerus, initial encounter    2. Status post total replacement of left shoulder    3. Anemia, unspecified type         Disposition:  Admit  2/13/2025  9:16 am    Follow-up:  No follow-up provider specified.  We recommend that you schedule follow up care with a primary care provider within the next three months to obtain basic health screening including reassessment of your blood pressure.      Medications Prescribed:  Current  Discharge Medication List              Supplementary Documentation:         Hospital Problems       Present on Admission  Date Reviewed: 2/11/2025            ICD-10-CM Noted POA    * (Principal) Closed fracture of head of left humerus, initial encounter S42.292A 2/13/2025 Unknown

## 2025-02-13 NOTE — ED QUICK NOTES
Orders for admission, patient is aware of plan and ready to go upstairs. Any questions, please call ED RN Mouna at extension [phone].     Patient Covid vaccination status: Unvaccinated     COVID Test Ordered in ED: None    COVID Suspicion at Admission: N/A    Running Infusions:  None    Mental Status/LOC at time of transport: A&Ox4    Other pertinent information: From home alone, continent    CIWA score: N/A   NIH score:  N/A

## 2025-02-13 NOTE — ED INITIAL ASSESSMENT (HPI)
Pt presents to the ER via EMS s/p mechanical fall at home. Pt states she fell on left shoulder. Pt recently had left shoulder replacement on 2/11/25.     Denies hitting head. Takes ASA 81 mg daily

## 2025-02-13 NOTE — RESPIRATORY THERAPY NOTE
CPAP/BIPAP EVALUATION: Done    NOTES: Patient was evaluated for BRYANNA and stated that she has been diagnosed with BRYANNA. Patient will be using own CPAP machine during this admission. RT will be available for further assistance and provide necessary equipments.

## 2025-02-14 LAB
DEPRECATED RDW RBC AUTO: 41.4 FL (ref 35.1–46.3)
ERYTHROCYTE [DISTWIDTH] IN BLOOD BY AUTOMATED COUNT: 13.2 % (ref 11–15)
HCT VFR BLD AUTO: 34 %
HGB BLD-MCNC: 11.3 G/DL
MCH RBC QN AUTO: 28.8 PG (ref 26–34)
MCHC RBC AUTO-ENTMCNC: 33.2 G/DL (ref 31–37)
MCV RBC AUTO: 86.7 FL
MRSA DNA SPEC QL NAA+PROBE: NEGATIVE
PLATELET # BLD AUTO: 219 10(3)UL (ref 150–450)
RBC # BLD AUTO: 3.92 X10(6)UL
WBC # BLD AUTO: 10.3 X10(3) UL (ref 4–11)

## 2025-02-14 PROCEDURE — 99233 SBSQ HOSP IP/OBS HIGH 50: CPT | Performed by: HOSPITALIST

## 2025-02-14 RX ORDER — FLUCONAZOLE 150 MG/1
150 TABLET ORAL ONCE
Status: COMPLETED | OUTPATIENT
Start: 2025-02-14 | End: 2025-02-14

## 2025-02-14 NOTE — PLAN OF CARE
Problem: PAIN - ADULT  Goal: Verbalizes/displays adequate comfort level or patient's stated pain goal  Description: INTERVENTIONS:  - Encourage pt to monitor pain and request assistance  - Assess pain using appropriate pain scale  - Administer analgesics based on type and severity of pain and evaluate response  - Implement non-pharmacological measures as appropriate and evaluate response  - Consider cultural and social influences on pain and pain management  - Manage/alleviate anxiety  - Utilize distraction and/or relaxation techniques  - Monitor for opioid side effects  - Notify MD/LIP if interventions unsuccessful or patient reports new pain  - Anticipate increased pain with activity and pre-medicate as appropriate  Outcome: Progressing

## 2025-02-14 NOTE — PROGRESS NOTES
[facility]  part of [facility]    Progress Note    Clyde Castro Patient Status:  Inpatient    1955 MRN [id number]   Location [facility] 4W/SW/SE Attending Vaishnavi Trammell MD   Hosp Day # 1 PCP WILLIAM HULESCH     Chief complaint: Shoulder fracture  Subjective:   Still constipated  Pains reasonably controlled  No new issues  No fevers  Worries about yeast infection with Abx    Objective:   Blood pressure 121/75, pulse 74, temperature 97.6 °F (36.4 °C), temperature source Temporal, resp. rate 16, height 5' 3\" (1.6 m), weight 211 lb (95.7 kg), SpO2 100%.    HEENT: conjunctivae/corneas clear. PERRL, EOM's intact.  Neck: no adenopathy, no carotid bruit, no JVD, supple  Pulmonary:  clear to auscultation bilaterally  Cardiovascular: S1, S2 normal, +loud heart murmur, no gallop, regular rate and rhythm  Abdominal: soft, non-tender; bowel sounds normal; no masses,  no organomegaly  Extremities: no cyanosis or edema, L shoulder in sling  Pulses: palpable and symmetric  Skin: No rashes or lesions  Neurologic: Alert and oriented X 3,    Psychiatric: calm, cooperative    Assessment and Plan:     Closed fracture of head of left humerus, initial encounter  H/o uneventful L shoulder total arthroplasty on 25  -plans for OR on   -pain control  -monitor hgb, stable ~11.3  -Initial leukocytosis improved  -bowel protocol  -DVT proph with heparin subcutaneous  -hold naproxen and ASA for now  -cont proph doxy (from recent surgery)  -Okay for dose of fluconazole per patient request (to prevent a yeast infection)     BRYANNA, per protocol  HTN, wnl, monitor  HL, on statins  Reflux      H/o severe aortic stenosis, asymptomatic  Follows with Dr. Marino at [facility]  Previous stress test ok  -avoid hypotension  -otherwise, pt tolerated previous surgery well, ok to proceed  -tele perioperatively   -Hold lisinopril prior surgery, BP low normal     Dispo: pending progress, will need  to reeval post surgery with PT/OT, however suspect she would benefit from rehab.  Shoulder fracture            Supplementary Documentation:   DVT Mechanical Prophylaxis:   SCDs, Early ambuation  DVT Pharmacologic Prophylaxis   Medication    heparin (Porcine) 5000 UNIT/ML injection 5,000 Units         DVT Pharmacologic prophylaxis: Aspirin 81 mg      Code Status: Not on file  Calle: No urinary catheter in place  Calle Duration (in days):   Central line:    DOUG: 2/17/2025                        Chart reviewed, including current vitals, notes, labs and imaging  Pertinent past medical records reviewed  Labs ordered and medications adjusted as outlined above  Home medications reconciliation completed  Coordinate care with care team/consultants  Discussed with patient  at bedside results of tests, management plan as outlined above, and the need for ongoing hospitalization  D/w RN     MDM high  severe acute illness/or exacerbation of chronic illness posing a threat to life, IV medications, requiring close monitoring in hospital.       Results:     Lab Results   Component Value Date    WBC 10.3 02/14/2025    HGB 11.3 (L) 02/14/2025    HCT 34.0 (L) 02/14/2025    .0 02/14/2025    CREATSERUM 0.73 02/13/2025    BUN 23 02/13/2025     (L) 02/13/2025    K 4.0 02/13/2025     02/13/2025    CO2 26.0 02/13/2025    GLU 94 02/13/2025    CA 8.7 02/13/2025    ALB 4.0 02/13/2025    ALKPHO 61 02/13/2025    BILT 0.4 02/13/2025    TP 6.3 02/13/2025    AST 17 02/13/2025    ALT 15 02/13/2025    MG 1.8 02/13/2025       XR SHOULDER, COMPLETE (MIN 2 VIEWS), LEFT (CPT=73030)    Result Date: 2/13/2025  CONCLUSION:  Acute displaced periprosthetic left humeral head fracture.    Dictated by (CST): Meir Mcclelland MD on 2/13/2025 at 8:35 AM     Finalized by (CST): Meir Mcclelland MD on 2/13/2025 at 8:37 AM                 KERRI HUFFMAN MD  2/14/2025

## 2025-02-14 NOTE — PLAN OF CARE
Problem: PAIN - ADULT  Goal: Verbalizes/displays adequate comfort level or patient's stated pain goal  Description: INTERVENTIONS:  - Encourage pt to monitor pain and request assistance  - Assess pain using appropriate pain scale  - Administer analgesics based on type and severity of pain and evaluate response  - Implement non-pharmacological measures as appropriate and evaluate response  - Consider cultural and social influences on pain and pain management  - Manage/alleviate anxiety  - Utilize distraction and/or relaxation techniques  - Monitor for opioid side effects  - Notify MD/LIP if interventions unsuccessful or patient reports new pain  - Anticipate increased pain with activity and pre-medicate as appropriate  Outcome: Progressing     Problem: SAFETY ADULT - FALL  Goal: Free from fall injury  Description: INTERVENTIONS:  - Assess pt frequently for physical needs  - Identify cognitive and physical deficits and behaviors that affect risk of falls.  - [place] fall precautions as indicated by assessment.  - Educate pt/family on patient safety including physical limitations  - Instruct pt to call for assistance with activity based on assessment  - Modify environment to reduce risk of injury  - Provide assistive devices as appropriate  - Consider OT/PT consult to assist with strengthening/mobility  - Encourage toileting schedule  Outcome: Progressing   ED admission, s/p fall. Aquacel dressing to L shoulder intact. Immobilizer in place. Pain managed with Norco as needed. Ice pack applied. Patient ambulates with stand by assist. Patient presents with NSR on remote tele. Surgery scheduled or Sunday.

## 2025-02-14 NOTE — PLAN OF CARE
No acute changes overnight. CPAP on overnight. Pain medication given as needed. Patient is a standby assist. Voiding freely. Safety precautions maintained, bed locked and in lowest position, call light in reach. Frequent rounding by nursing staff. Plan for surgery on Sunday.    Problem: Patient Centered Care  Goal: Patient preferences are identified and integrated in the patient's plan of care  Description: Interventions:  - What would you like us to know as we care for you?   - Provide timely, complete, and accurate information to patient/family  - Incorporate patient and family knowledge, values, beliefs, and cultural backgrounds into the planning and delivery of care  - Encourage patient/family to participate in care and decision-making at the level they choose  - Honor patient and family perspectives and choices  Outcome: Progressing     Problem: Patient/Family Goals  Goal: Patient/Family Long Term Goal  Description: Patient's Long Term Goal: go home    Interventions:  - surgery of left shoulder  - manage pain  - monitor vitals and labs  - maximize mobility    - See additional Care Plan goals for specific interventions  Outcome: Progressing  Goal: Patient/Family Short Term Goal  Description: Patient's Short Term Goal: manage pain    Interventions:   - PRN pain medication  - sling  - Ice packs    - See additional Care Plan goals for specific interventions  Outcome: Progressing     Problem: PAIN - ADULT  Goal: Verbalizes/displays adequate comfort level or patient's stated pain goal  Description: INTERVENTIONS:  - Encourage pt to monitor pain and request assistance  - Assess pain using appropriate pain scale  - Administer analgesics based on type and severity of pain and evaluate response  - Implement non-pharmacological measures as appropriate and evaluate response  - Consider cultural and social influences on pain and pain management  - Manage/alleviate anxiety  - Utilize distraction and/or relaxation  techniques  - Monitor for opioid side effects  - Notify MD/LIP if interventions unsuccessful or patient reports new pain  - Anticipate increased pain with activity and pre-medicate as appropriate  Outcome: Progressing     Problem: RISK FOR INFECTION - ADULT  Goal: Absence of fever/infection during anticipated neutropenic period  Description: INTERVENTIONS  - Monitor WBC  - Administer growth factors as ordered  - Implement neutropenic guidelines  Outcome: Progressing     Problem: SAFETY ADULT - FALL  Goal: Free from fall injury  Description: INTERVENTIONS:  - Assess pt frequently for physical needs  - Identify cognitive and physical deficits and behaviors that affect risk of falls.  - [place] fall precautions as indicated by assessment.  - Educate pt/family on patient safety including physical limitations  - Instruct pt to call for assistance with activity based on assessment  - Modify environment to reduce risk of injury  - Provide assistive devices as appropriate  - Consider OT/PT consult to assist with strengthening/mobility  - Encourage toileting schedule  Outcome: Progressing     Problem: DISCHARGE PLANNING  Goal: Discharge to home or other facility with appropriate resources  Description: INTERVENTIONS:  - Identify barriers to discharge w/pt and caregiver  - Include patient/family/discharge partner in discharge planning  - Arrange for needed discharge resources and transportation as appropriate  - Identify discharge learning needs (meds, wound care, etc)  - Arrange for interpreters to assist at discharge as needed  - Consider post-discharge preferences of patient/family/discharge partner  - Complete POLST form as appropriate  - Assess patient's ability to be responsible for managing their own health  - Refer to Case Management Department for coordinating discharge planning if the patient needs post-hospital services based on physician/LIP order or complex needs related to functional status, cognitive ability  or social support system  Outcome: Not Progressing     Problem: SKIN/TISSUE INTEGRITY - ADULT  Goal: Incision(s), wounds(s) or drain site(s) healing without S/S of infection  Description: INTERVENTIONS:  - Assess and document risk factors for pressure ulcer development  - Assess and document skin integrity  - Assess and document dressing/incision, wound bed, drain sites and surrounding tissue  - Implement wound care per orders  - Initiate isolation precautions as appropriate  - Initiate Pressure Ulcer prevention bundle as indicated  Outcome: Progressing

## 2025-02-15 PROCEDURE — 99232 SBSQ HOSP IP/OBS MODERATE 35: CPT | Performed by: HOSPITALIST

## 2025-02-15 NOTE — PLAN OF CARE
Problem: PAIN - ADULT  Goal: Verbalizes/displays adequate comfort level or patient's stated pain goal  Description: INTERVENTIONS:  - Encourage pt to monitor pain and request assistance  - Assess pain using appropriate pain scale  - Administer analgesics based on type and severity of pain and evaluate response  - Implement non-pharmacological measures as appropriate and evaluate response  - Consider cultural and social influences on pain and pain management  - Manage/alleviate anxiety  - Utilize distraction and/or relaxation techniques  - Monitor for opioid side effects  - Notify MD/LIP if interventions unsuccessful or patient reports new pain  - Anticipate increased pain with activity and pre-medicate as appropriate  2/14/2025 1809 by Vaishnavi Ruffin, RN  Outcome: Progressing  2/14/2025 1341 by Vaishnavi Ruffin RN  Outcome: Progressing     Problem: SAFETY ADULT - FALL  Goal: Free from fall injury  Description: INTERVENTIONS:  - Assess pt frequently for physical needs  - Identify cognitive and physical deficits and behaviors that affect risk of falls.  - [place] fall precautions as indicated by assessment.  - Educate pt/family on patient safety including physical limitations  - Instruct pt to call for assistance with activity based on assessment  - Modify environment to reduce risk of injury  - Provide assistive devices as appropriate  - Consider OT/PT consult to assist with strengthening/mobility  - Encourage toileting schedule  2/14/2025 1809 by Vaishnavi Ruffin, RN  Outcome: Progressing  2/14/2025 1341 by Vaishnavi Ruffin, RN  Outcome: Progressing

## 2025-02-15 NOTE — PLAN OF CARE
No acute changes overnight. CPAP on overnight. Pain medication given as needed. Patient is a standby assist. Voiding freely. Safety precautions maintained, bed locked and in lowest position, call light in reach. Frequent rounding by nursing staff. Plan for surgery on Sunday.    Problem: Patient Centered Care  Goal: Patient preferences are identified and integrated in the patient's plan of care  Description: Interventions:  - What would you like us to know as we care for you?   - Provide timely, complete, and accurate information to patient/family  - Incorporate patient and family knowledge, values, beliefs, and cultural backgrounds into the planning and delivery of care  - Encourage patient/family to participate in care and decision-making at the level they choose  - Honor patient and family perspectives and choices  Outcome: Progressing     Problem: Patient/Family Goals  Goal: Patient/Family Long Term Goal  Description: Patient's Long Term Goal: go home    Interventions:  - surgery of left shoulder  - manage pain  - monitor vitals and labs  - maximize mobility    - See additional Care Plan goals for specific interventions  Outcome: Not Progressing  Goal: Patient/Family Short Term Goal  Description: Patient's Short Term Goal: manage pain    Interventions:   - PRN pain medication  - sling  - Ice packs    - See additional Care Plan goals for specific interventions  Outcome: Progressing     Problem: PAIN - ADULT  Goal: Verbalizes/displays adequate comfort level or patient's stated pain goal  Description: INTERVENTIONS:  - Encourage pt to monitor pain and request assistance  - Assess pain using appropriate pain scale  - Administer analgesics based on type and severity of pain and evaluate response  - Implement non-pharmacological measures as appropriate and evaluate response  - Consider cultural and social influences on pain and pain management  - Manage/alleviate anxiety  - Utilize distraction and/or relaxation  techniques  - Monitor for opioid side effects  - Notify MD/LIP if interventions unsuccessful or patient reports new pain  - Anticipate increased pain with activity and pre-medicate as appropriate  Outcome: Progressing     Problem: RISK FOR INFECTION - ADULT  Goal: Absence of fever/infection during anticipated neutropenic period  Description: INTERVENTIONS  - Monitor WBC  - Administer growth factors as ordered  - Implement neutropenic guidelines  Outcome: Progressing     Problem: SAFETY ADULT - FALL  Goal: Free from fall injury  Description: INTERVENTIONS:  - Assess pt frequently for physical needs  - Identify cognitive and physical deficits and behaviors that affect risk of falls.  - [place] fall precautions as indicated by assessment.  - Educate pt/family on patient safety including physical limitations  - Instruct pt to call for assistance with activity based on assessment  - Modify environment to reduce risk of injury  - Provide assistive devices as appropriate  - Consider OT/PT consult to assist with strengthening/mobility  - Encourage toileting schedule  Outcome: Progressing     Problem: DISCHARGE PLANNING  Goal: Discharge to home or other facility with appropriate resources  Description: INTERVENTIONS:  - Identify barriers to discharge w/pt and caregiver  - Include patient/family/discharge partner in discharge planning  - Arrange for needed discharge resources and transportation as appropriate  - Identify discharge learning needs (meds, wound care, etc)  - Arrange for interpreters to assist at discharge as needed  - Consider post-discharge preferences of patient/family/discharge partner  - Complete POLST form as appropriate  - Assess patient's ability to be responsible for managing their own health  - Refer to Case Management Department for coordinating discharge planning if the patient needs post-hospital services based on physician/LIP order or complex needs related to functional status, cognitive ability  or social support system  Outcome: Not Progressing     Problem: SKIN/TISSUE INTEGRITY - ADULT  Goal: Incision(s), wounds(s) or drain site(s) healing without S/S of infection  Description: INTERVENTIONS:  - Assess and document risk factors for pressure ulcer development  - Assess and document skin integrity  - Assess and document dressing/incision, wound bed, drain sites and surrounding tissue  - Implement wound care per orders  - Initiate isolation precautions as appropriate  - Initiate Pressure Ulcer prevention bundle as indicated  Outcome: Progressing

## 2025-02-15 NOTE — PLAN OF CARE
Pt is A&Ox4. RA. CPAP @. Remote tele. Heparin for dvt prophylaxis. Voiding freely. PRN norco for pain management. Ip standby assist. General diet. NPO @midnight. [place] locked. L shoulder, immobilzier. Plan for surgery tomorrow.    Problem: Patient Centered Care  Goal: Patient preferences are identified and integrated in the patient's plan of care  Description: Interventions:  - What would you like us to know as we care for you?  - Provide timely, complete, and accurate information to patient/family  - Incorporate patient and family knowledge, values, beliefs, and cultural backgrounds into the planning and delivery of care  - Encourage patient/family to participate in care and decision-making at the level they choose  - Honor patient and family perspectives and choices  Outcome: Progressing     Problem: Patient/Family Goals  Goal: Patient/Family Long Term Goal  Description: Patient's Long Term Goal: go home    Interventions:  - surgery of left shoulder  - manage pain  - monitor vitals and labs  - maximize mobility    - See additional Care Plan goals for specific interventions  Outcome: Progressing  Goal: Patient/Family Short Term Goal  Description: Patient's Short Term Goal: manage pain    Interventions:   - PRN pain medication  - sling  - Ice packs    - See additional Care Plan goals for specific interventions  Outcome: Progressing     Problem: PAIN - ADULT  Goal: Verbalizes/displays adequate comfort level or patient's stated pain goal  Description: INTERVENTIONS:  - Encourage pt to monitor pain and request assistance  - Assess pain using appropriate pain scale  - Administer analgesics based on type and severity of pain and evaluate response  - Implement non-pharmacological measures as appropriate and evaluate response  - Consider cultural and social influences on pain and pain management  - Manage/alleviate anxiety  - Utilize distraction and/or relaxation techniques  - Monitor for opioid side effects  - Notify  MD/LIP if interventions unsuccessful or patient reports new pain  - Anticipate increased pain with activity and pre-medicate as appropriate  Outcome: Progressing     Problem: RISK FOR INFECTION - ADULT  Goal: Absence of fever/infection during anticipated neutropenic period  Description: INTERVENTIONS  - Monitor WBC  - Administer growth factors as ordered  - Implement neutropenic guidelines  Outcome: Progressing     Problem: SAFETY ADULT - FALL  Goal: Free from fall injury  Description: INTERVENTIONS:  - Assess pt frequently for physical needs  - Identify cognitive and physical deficits and behaviors that affect risk of falls.  - [place] fall precautions as indicated by assessment.  - Educate pt/family on patient safety including physical limitations  - Instruct pt to call for assistance with activity based on assessment  - Modify environment to reduce risk of injury  - Provide assistive devices as appropriate  - Consider OT/PT consult to assist with strengthening/mobility  - Encourage toileting schedule  Outcome: Progressing     Problem: DISCHARGE PLANNING  Goal: Discharge to home or other facility with appropriate resources  Description: INTERVENTIONS:  - Identify barriers to discharge w/pt and caregiver  - Include patient/family/discharge partner in discharge planning  - Arrange for needed discharge resources and transportation as appropriate  - Identify discharge learning needs (meds, wound care, etc)  - Arrange for interpreters to assist at discharge as needed  - Consider post-discharge preferences of patient/family/discharge partner  - Complete POLST form as appropriate  - Assess patient's ability to be responsible for managing their own health  - Refer to Case Management Department for coordinating discharge planning if the patient needs post-hospital services based on physician/LIP order or complex needs related to functional status, cognitive ability or social support system  Outcome: Progressing      Problem: SKIN/TISSUE INTEGRITY - ADULT  Goal: Incision(s), wounds(s) or drain site(s) healing without S/S of infection  Description: INTERVENTIONS:  - Assess and document risk factors for pressure ulcer development  - Assess and document skin integrity  - Assess and document dressing/incision, wound bed, drain sites and surrounding tissue  - Implement wound care per orders  - Initiate isolation precautions as appropriate  - Initiate Pressure Ulcer prevention bundle as indicated  Outcome: Progressing

## 2025-02-15 NOTE — PROGRESS NOTES
[facility]  part of [facility]    Progress Note    Clyde Castro Patient Status:  Inpatient    1955 MRN [id number]   Location [facility] 4W/SW/SE Attending Vaishnavi Trammell MD   Hosp Day # 2 PCP WILLIAM HULESCH     Chief complaint: Shoulder fracture  Subjective:   Having BMs  Pains reasonably controlled  No new issues  No fevers       Objective:   Blood pressure 118/67, pulse 76, temperature 97.1 °F (36.2 °C), temperature source Temporal, resp. rate 18, height 5' 3\" (1.6 m), weight 211 lb (95.7 kg), SpO2 96%.    HEENT: conjunctivae/corneas clear. PERRL, EOM's intact.  Neck: no adenopathy, no carotid bruit, no JVD, supple  Pulmonary:  clear to auscultation bilaterally  Cardiovascular: S1, S2 normal, +loud heart murmur, no gallop, regular rate and rhythm  Abdominal: soft, non-tender; bowel sounds normal; no masses,  no organomegaly  Extremities: no cyanosis or edema, L shoulder in sling  Pulses: palpable and symmetric  Skin: No rashes or lesions  Neurologic: Alert and oriented X 3,    Psychiatric: calm, cooperative    Assessment and Plan:     Closed fracture of head of left humerus, initial encounter  H/o uneventful L shoulder total arthroplasty on 25  -plans for OR on   -pain control  -monitor hgb, stable ~11.3  -Initial leukocytosis improved  -bowel protocol  -DVT proph with heparin subcutaneous  -hold naproxen and ASA for now  -cont proph doxy (from recent surgery)  -Okay for dose of fluconazole per patient request (to prevent a yeast infection)     BRYANNA, per protocol  HTN, wnl, at home actually on lisinopril 10 mg, now held as BP on lower side, monitor    HL, on statins  Reflux      H/o severe aortic stenosis, asymptomatic  Follows with Dr. Marino at [facility]  Previous stress test ok  -avoid hypotension  -otherwise, pt tolerated previous surgery well, ok to proceed  -tele perioperatively   -Hold lisinopril prior surgery, BP low normal     Dispo:  pending progress, will need to reeval post surgery with PT/OT, however suspect she would benefit from rehab.               Supplementary Documentation:   DVT Mechanical Prophylaxis:   SCDs, Early ambuation  DVT Pharmacologic Prophylaxis   Medication    [Held by provider] heparin (Porcine) 5000 UNIT/ML injection 5,000 Units         DVT Pharmacologic prophylaxis: Aspirin 81 mg      Code Status: Full Code  Calle: No urinary catheter in place  Calle Duration (in days):   Central line:    DOUG: 2/17/2025                        Chart reviewed, including current vitals, notes, labs and imaging  Pertinent past medical records reviewed  Labs ordered and medications adjusted as outlined above  Home medications reconciliation completed  Coordinate care with care team/consultants  Discussed with patient  at bedside results of tests, management plan as outlined above, and the need for ongoing hospitalization  D/w RN     MDM moderate      Results:     Lab Results   Component Value Date    WBC 10.3 02/14/2025    HGB 11.3 (L) 02/14/2025    HCT 34.0 (L) 02/14/2025    .0 02/14/2025    CREATSERUM 0.73 02/13/2025    BUN 23 02/13/2025     (L) 02/13/2025    K 4.0 02/13/2025     02/13/2025    CO2 26.0 02/13/2025    GLU 94 02/13/2025    CA 8.7 02/13/2025    ALB 4.0 02/13/2025    ALKPHO 61 02/13/2025    BILT 0.4 02/13/2025    TP 6.3 02/13/2025    AST 17 02/13/2025    ALT 15 02/13/2025    MG 1.8 02/13/2025       No results found.          KERRI HUFFMAN MD  2/15/2025

## 2025-02-16 ENCOUNTER — APPOINTMENT (OUTPATIENT)
Dept: GENERAL RADIOLOGY | Facility: HOSPITAL | Age: 70
End: 2025-02-16
Attending: ORTHOPAEDIC SURGERY
Payer: MEDICARE

## 2025-02-16 ENCOUNTER — ANESTHESIA (OUTPATIENT)
Dept: SURGERY | Facility: HOSPITAL | Age: 70
End: 2025-02-16
Payer: MEDICARE

## 2025-02-16 ENCOUNTER — ANESTHESIA EVENT (OUTPATIENT)
Dept: SURGERY | Facility: HOSPITAL | Age: 70
End: 2025-02-16
Payer: MEDICARE

## 2025-02-16 PROCEDURE — 0RRK00Z REPLACEMENT OF LEFT SHOULDER JOINT WITH REVERSE BALL AND SOCKET SYNTHETIC SUBSTITUTE, OPEN APPROACH: ICD-10-PCS | Performed by: ORTHOPAEDIC SURGERY

## 2025-02-16 PROCEDURE — 99233 SBSQ HOSP IP/OBS HIGH 50: CPT | Performed by: HOSPITALIST

## 2025-02-16 PROCEDURE — 0RPK0JZ REMOVAL OF SYNTHETIC SUBSTITUTE FROM LEFT SHOULDER JOINT, OPEN APPROACH: ICD-10-PCS | Performed by: ORTHOPAEDIC SURGERY

## 2025-02-16 PROCEDURE — 3E0T3BZ INTRODUCTION OF ANESTHETIC AGENT INTO PERIPHERAL NERVES AND PLEXI, PERCUTANEOUS APPROACH: ICD-10-PCS | Performed by: ANESTHESIOLOGY

## 2025-02-16 PROCEDURE — 73030 X-RAY EXAM OF SHOULDER: CPT | Performed by: ORTHOPAEDIC SURGERY

## 2025-02-16 DEVICE — IMPLANTABLE DEVICE
Type: IMPLANTABLE DEVICE | Site: SHOULDER | Status: FUNCTIONAL
Brand: TORNIER PERFORM™ HUMERAL SYSTEM

## 2025-02-16 DEVICE — IMPLANTABLE DEVICE
Type: IMPLANTABLE DEVICE | Site: SHOULDER | Status: FUNCTIONAL
Brand: TORNIER PERFORM® REVERSED AUGMENTED GLENOID

## 2025-02-16 DEVICE — SCREW BNE 5X38MM PERIPH GLEN PERFORM: Type: IMPLANTABLE DEVICE | Site: SHOULDER | Status: FUNCTIONAL

## 2025-02-16 DEVICE — IMPLANTABLE DEVICE
Type: IMPLANTABLE DEVICE | Site: SHOULDER | Status: FUNCTIONAL
Brand: TORNIER PERFORM™ REVERSED

## 2025-02-16 DEVICE — FRACTURE STEM
Type: IMPLANTABLE DEVICE | Site: SHOULDER | Status: FUNCTIONAL
Brand: TORNIER PERFORM HUMERAL SYSTEM

## 2025-02-16 DEVICE — SCREW BNE 6.5X30MM CNTRL GLEN THRD PERFORM: Type: IMPLANTABLE DEVICE | Site: SHOULDER | Status: FUNCTIONAL

## 2025-02-16 RX ORDER — MIDAZOLAM HYDROCHLORIDE 1 MG/ML
INJECTION INTRAMUSCULAR; INTRAVENOUS AS NEEDED
Status: DISCONTINUED | OUTPATIENT
Start: 2025-02-16 | End: 2025-02-16 | Stop reason: SURG

## 2025-02-16 RX ORDER — POLYETHYLENE GLYCOL 3350 17 G/17G
17 POWDER, FOR SOLUTION ORAL DAILY PRN
Status: DISCONTINUED | OUTPATIENT
Start: 2025-02-16 | End: 2025-02-19

## 2025-02-16 RX ORDER — TRANEXAMIC ACID 10 MG/ML
INJECTION, SOLUTION INTRAVENOUS AS NEEDED
Status: DISCONTINUED | OUTPATIENT
Start: 2025-02-16 | End: 2025-02-16 | Stop reason: SURG

## 2025-02-16 RX ORDER — HYDROMORPHONE HYDROCHLORIDE 1 MG/ML
0.2 INJECTION, SOLUTION INTRAMUSCULAR; INTRAVENOUS; SUBCUTANEOUS EVERY 5 MIN PRN
Status: DISCONTINUED | OUTPATIENT
Start: 2025-02-16 | End: 2025-02-16 | Stop reason: HOSPADM

## 2025-02-16 RX ORDER — ONDANSETRON 2 MG/ML
4 INJECTION INTRAMUSCULAR; INTRAVENOUS EVERY 6 HOURS PRN
Status: DISCONTINUED | OUTPATIENT
Start: 2025-02-16 | End: 2025-02-19

## 2025-02-16 RX ORDER — ROCURONIUM BROMIDE 10 MG/ML
INJECTION, SOLUTION INTRAVENOUS AS NEEDED
Status: DISCONTINUED | OUTPATIENT
Start: 2025-02-16 | End: 2025-02-16 | Stop reason: SURG

## 2025-02-16 RX ORDER — BISACODYL 10 MG
10 SUPPOSITORY, RECTAL RECTAL
Status: DISCONTINUED | OUTPATIENT
Start: 2025-02-16 | End: 2025-02-19

## 2025-02-16 RX ORDER — LIDOCAINE HYDROCHLORIDE 10 MG/ML
INJECTION, SOLUTION INFILTRATION; PERINEURAL
Status: COMPLETED | OUTPATIENT
Start: 2025-02-16 | End: 2025-02-16

## 2025-02-16 RX ORDER — HYDROMORPHONE HYDROCHLORIDE 1 MG/ML
0.4 INJECTION, SOLUTION INTRAMUSCULAR; INTRAVENOUS; SUBCUTANEOUS EVERY 2 HOUR PRN
Status: DISCONTINUED | OUTPATIENT
Start: 2025-02-16 | End: 2025-02-19

## 2025-02-16 RX ORDER — DOCUSATE SODIUM 100 MG/1
100 CAPSULE, LIQUID FILLED ORAL 2 TIMES DAILY
Status: DISCONTINUED | OUTPATIENT
Start: 2025-02-16 | End: 2025-02-19

## 2025-02-16 RX ORDER — DEXAMETHASONE SODIUM PHOSPHATE 4 MG/ML
VIAL (ML) INJECTION AS NEEDED
Status: DISCONTINUED | OUTPATIENT
Start: 2025-02-16 | End: 2025-02-16 | Stop reason: SURG

## 2025-02-16 RX ORDER — ONDANSETRON 2 MG/ML
INJECTION INTRAMUSCULAR; INTRAVENOUS AS NEEDED
Status: DISCONTINUED | OUTPATIENT
Start: 2025-02-16 | End: 2025-02-16 | Stop reason: SURG

## 2025-02-16 RX ORDER — METOCLOPRAMIDE HYDROCHLORIDE 5 MG/ML
10 INJECTION INTRAMUSCULAR; INTRAVENOUS EVERY 8 HOURS PRN
Status: DISCONTINUED | OUTPATIENT
Start: 2025-02-16 | End: 2025-02-19

## 2025-02-16 RX ORDER — MORPHINE SULFATE 4 MG/ML
4 INJECTION, SOLUTION INTRAMUSCULAR; INTRAVENOUS EVERY 10 MIN PRN
Status: DISCONTINUED | OUTPATIENT
Start: 2025-02-16 | End: 2025-02-16 | Stop reason: HOSPADM

## 2025-02-16 RX ORDER — VANCOMYCIN HYDROCHLORIDE 1 G/20ML
INJECTION, POWDER, LYOPHILIZED, FOR SOLUTION INTRAVENOUS AS NEEDED
Status: DISCONTINUED | OUTPATIENT
Start: 2025-02-16 | End: 2025-02-16 | Stop reason: HOSPADM

## 2025-02-16 RX ORDER — ACETAMINOPHEN 10 MG/ML
1000 INJECTION, SOLUTION INTRAVENOUS ONCE
Status: COMPLETED | OUTPATIENT
Start: 2025-02-16 | End: 2025-02-16

## 2025-02-16 RX ORDER — SENNOSIDES 8.6 MG
17.2 TABLET ORAL NIGHTLY
Status: DISCONTINUED | OUTPATIENT
Start: 2025-02-16 | End: 2025-02-19

## 2025-02-16 RX ORDER — SODIUM CHLORIDE, SODIUM LACTATE, POTASSIUM CHLORIDE, CALCIUM CHLORIDE 600; 310; 30; 20 MG/100ML; MG/100ML; MG/100ML; MG/100ML
INJECTION, SOLUTION INTRAVENOUS CONTINUOUS
Status: DISCONTINUED | OUTPATIENT
Start: 2025-02-16 | End: 2025-02-16 | Stop reason: HOSPADM

## 2025-02-16 RX ORDER — SODIUM PHOSPHATE, DIBASIC AND SODIUM PHOSPHATE, MONOBASIC 7; 19 G/230ML; G/230ML
1 ENEMA RECTAL ONCE AS NEEDED
Status: DISCONTINUED | OUTPATIENT
Start: 2025-02-16 | End: 2025-02-19

## 2025-02-16 RX ORDER — ASPIRIN 81 MG/1
81 TABLET ORAL 2 TIMES DAILY
Status: DISCONTINUED | OUTPATIENT
Start: 2025-02-16 | End: 2025-02-19

## 2025-02-16 RX ORDER — MORPHINE SULFATE 4 MG/ML
2 INJECTION, SOLUTION INTRAMUSCULAR; INTRAVENOUS EVERY 10 MIN PRN
Status: DISCONTINUED | OUTPATIENT
Start: 2025-02-16 | End: 2025-02-16 | Stop reason: HOSPADM

## 2025-02-16 RX ORDER — EPHEDRINE SULFATE 50 MG/ML
INJECTION, SOLUTION INTRAVENOUS AS NEEDED
Status: DISCONTINUED | OUTPATIENT
Start: 2025-02-16 | End: 2025-02-16 | Stop reason: SURG

## 2025-02-16 RX ORDER — OXYCODONE HYDROCHLORIDE 5 MG/1
5 TABLET ORAL EVERY 4 HOURS PRN
Status: DISCONTINUED | OUTPATIENT
Start: 2025-02-16 | End: 2025-02-19

## 2025-02-16 RX ORDER — HYDROMORPHONE HYDROCHLORIDE 1 MG/ML
0.6 INJECTION, SOLUTION INTRAMUSCULAR; INTRAVENOUS; SUBCUTANEOUS EVERY 5 MIN PRN
Status: DISCONTINUED | OUTPATIENT
Start: 2025-02-16 | End: 2025-02-16 | Stop reason: HOSPADM

## 2025-02-16 RX ORDER — OXYCODONE HYDROCHLORIDE 5 MG/1
2.5 TABLET ORAL EVERY 4 HOURS PRN
Status: DISCONTINUED | OUTPATIENT
Start: 2025-02-16 | End: 2025-02-19

## 2025-02-16 RX ORDER — MORPHINE SULFATE 10 MG/ML
6 INJECTION, SOLUTION INTRAMUSCULAR; INTRAVENOUS EVERY 10 MIN PRN
Status: DISCONTINUED | OUTPATIENT
Start: 2025-02-16 | End: 2025-02-16 | Stop reason: HOSPADM

## 2025-02-16 RX ORDER — HYDROMORPHONE HYDROCHLORIDE 1 MG/ML
0.4 INJECTION, SOLUTION INTRAMUSCULAR; INTRAVENOUS; SUBCUTANEOUS EVERY 5 MIN PRN
Status: DISCONTINUED | OUTPATIENT
Start: 2025-02-16 | End: 2025-02-16 | Stop reason: HOSPADM

## 2025-02-16 RX ORDER — HYDROMORPHONE HYDROCHLORIDE 1 MG/ML
0.2 INJECTION, SOLUTION INTRAMUSCULAR; INTRAVENOUS; SUBCUTANEOUS EVERY 2 HOUR PRN
Status: DISCONTINUED | OUTPATIENT
Start: 2025-02-16 | End: 2025-02-19

## 2025-02-16 RX ORDER — ROPIVACAINE HYDROCHLORIDE 5 MG/ML
INJECTION, SOLUTION EPIDURAL; INFILTRATION; PERINEURAL
Status: COMPLETED | OUTPATIENT
Start: 2025-02-16 | End: 2025-02-16

## 2025-02-16 RX ORDER — DEXAMETHASONE SODIUM PHOSPHATE 10 MG/ML
INJECTION, SOLUTION INTRAMUSCULAR; INTRAVENOUS
Status: COMPLETED | OUTPATIENT
Start: 2025-02-16 | End: 2025-02-16

## 2025-02-16 RX ORDER — NALOXONE HYDROCHLORIDE 0.4 MG/ML
0.08 INJECTION, SOLUTION INTRAMUSCULAR; INTRAVENOUS; SUBCUTANEOUS AS NEEDED
Status: DISCONTINUED | OUTPATIENT
Start: 2025-02-16 | End: 2025-02-16 | Stop reason: HOSPADM

## 2025-02-16 RX ADMIN — DEXAMETHASONE SODIUM PHOSPHATE 10 MG: 10 INJECTION, SOLUTION INTRAMUSCULAR; INTRAVENOUS at 08:28:00

## 2025-02-16 RX ADMIN — LIDOCAINE HYDROCHLORIDE 1 ML: 10 INJECTION, SOLUTION INFILTRATION; PERINEURAL at 08:28:00

## 2025-02-16 RX ADMIN — LIDOCAINE HYDROCHLORIDE 5 ML: 10 INJECTION, SOLUTION INFILTRATION; PERINEURAL at 08:44:00

## 2025-02-16 RX ADMIN — MIDAZOLAM HYDROCHLORIDE 2 MG: 1 INJECTION INTRAMUSCULAR; INTRAVENOUS at 08:40:00

## 2025-02-16 RX ADMIN — ROPIVACAINE HYDROCHLORIDE 30 ML: 5 INJECTION, SOLUTION EPIDURAL; INFILTRATION; PERINEURAL at 08:28:00

## 2025-02-16 RX ADMIN — EPHEDRINE SULFATE 10 MG: 50 INJECTION, SOLUTION INTRAVENOUS at 09:00:00

## 2025-02-16 RX ADMIN — EPHEDRINE SULFATE 10 MG: 50 INJECTION, SOLUTION INTRAVENOUS at 09:30:00

## 2025-02-16 RX ADMIN — EPHEDRINE SULFATE 10 MG: 50 INJECTION, SOLUTION INTRAVENOUS at 09:03:00

## 2025-02-16 RX ADMIN — EPHEDRINE SULFATE 10 MG: 50 INJECTION, SOLUTION INTRAVENOUS at 09:05:00

## 2025-02-16 RX ADMIN — TRANEXAMIC ACID 1000 MG: 10 INJECTION, SOLUTION INTRAVENOUS at 10:20:00

## 2025-02-16 RX ADMIN — DEXAMETHASONE SODIUM PHOSPHATE 8 MG: 4 MG/ML VIAL (ML) INJECTION at 08:44:00

## 2025-02-16 RX ADMIN — ROCURONIUM BROMIDE 50 MG: 10 INJECTION, SOLUTION INTRAVENOUS at 08:55:00

## 2025-02-16 RX ADMIN — TRANEXAMIC ACID 1000 MG: 10 INJECTION, SOLUTION INTRAVENOUS at 08:55:00

## 2025-02-16 RX ADMIN — ONDANSETRON 4 MG: 2 INJECTION INTRAMUSCULAR; INTRAVENOUS at 08:44:00

## 2025-02-16 RX ADMIN — EPHEDRINE SULFATE 10 MG: 50 INJECTION, SOLUTION INTRAVENOUS at 09:34:00

## 2025-02-16 NOTE — ANESTHESIA PROCEDURE NOTES
Airway  Date/Time: 2/16/2025 8:45 AM  Urgency: elective    Airway not difficult    General Information and Staff    Patient location during procedure: OR  Anesthesiologist: Jamarcus Velasco MD  Performed: anesthesiologist   Performed by: Jamarcus Velasco MD  Authorized by: Jamarcus Velasco MD      Indications and Patient Condition  Indications for airway management: anesthesia  Spontaneous Ventilation: absent  Sedation level: deep  Preoxygenated: yes  Patient position: sniffing  MILS not maintained throughout  Mask difficulty assessment: 0 - not attempted    Final Airway Details  Final airway type: endotracheal airway      Successful airway: ETT  Cuffed: yes   Successful intubation technique: direct laryngoscopy  Facilitating devices/methods: rapid sequence intubation  Endotracheal tube insertion site: oral  Blade: Sharon  Blade size: #4  ETT size (mm): 7.0    Cormack-Lehane Classification: grade I - full view of glottis  Placement verified by: capnometry   Cuff volume (mL): 5  Measured from: lips  ETT to lips (cm): 22  Number of attempts at approach: 1  Number of other approaches attempted: 0

## 2025-02-16 NOTE — ANESTHESIA POSTPROCEDURE EVALUATION
Patient: Clyde Castro    Procedure Summary       Date: 02/16/25 Room / Location: [facility] MAIN OR 04 / [facility] MAIN OR    Anesthesia Start: 0837 Anesthesia Stop: 1046    Procedure: LEFT REVERSE TOTAL SHOULDER (Left: Shoulder) Diagnosis:       Shoulder fracture, left      (Shoulder fracture, left [S42.92XA])    Surgeons: Leon Piña MD Anesthesiologist: Jamarcus Velasco MD    Anesthesia Type: general, regional ASA Status: 3            Anesthesia Type: general, regional    Vitals Value Taken Time   /106 02/16/25 1131   Temp 97.8 °F (36.6 °C) 02/16/25 1048   Pulse 75 02/16/25 1138   Resp 13 02/16/25 1138   SpO2 94 % 02/16/25 1138   Vitals shown include unfiled device data.    [facility] AN Post Evaluation:   Patient Evaluated in PACU  Patient Participation: complete - patient participated  Level of Consciousness: awake and alert  Pain Score: 0  Pain Management: adequate  Airway Patency:patent  Yes    Nausea/Vomiting: none  Cardiovascular Status: acceptable  Respiratory Status: acceptable  Postoperative Hydration acceptable      Jamarcus Velasco MD  2/16/2025 11:38 AM

## 2025-02-16 NOTE — ANESTHESIA PROCEDURE NOTES
Peripheral IV  Date/Time: 2/16/2025 8:49 AM  Inserted by: Jamarcus Velasco MD    Placement  Needle size: 18 G  Laterality: right  Location: hand  Local anesthetic: none  Site prep: alcohol  Technique: anatomical landmarks  Attempts: 1

## 2025-02-16 NOTE — BRIEF OP NOTE
Pre-Operative Diagnosis: Shoulder fracture, left [S42.92XA]     Post-Operative Diagnosis: Shoulder fracture, left [S42.92XA]      Procedure Performed:   LEFT REVERSE TOTAL SHOULDER    Surgeons and Role:     * Leon Piña MD - Primary    Assistant(s):  Surgical Assistant.: Shamar Calzada     Surgical Findings: None     Specimen: None     Estimated Blood Loss: Blood Output: 200 mL (2/11/2025 10:02 AM)    Plan    NWB LUE  Sling at all times  Pain control  ASA DVT ppx  Dressing changes prn  Follow up 2 weeks    Leon Piña MD  2/16/2025  10:22 AM

## 2025-02-16 NOTE — PLAN OF CARE
Clyde is A&Ox4 x4. On room air, CPAP HS. Remote tele. Voiding freely to the bathroom. Ambulates 1 assist.. Received medication and ice gel for pain. NPO at midnight. Plan for surgery this morning. Call light and belongings in reach. Bed locked and in lowest position, bed alarm on. Frequent rounding by nursing staff.     Problem: Patient Centered Care  Goal: Patient preferences are identified and integrated in the patient's plan of care  Description: Interventions:  - What would you like us to know as we care for you?   - Provide timely, complete, and accurate information to patient/family  - Incorporate patient and family knowledge, values, beliefs, and cultural backgrounds into the planning and delivery of care  - Encourage patient/family to participate in care and decision-making at the level they choose  - Honor patient and family perspectives and choices  Outcome: Progressing     Problem: Patient/Family Goals  Goal: Patient/Family Long Term Goal  Description: Patient's Long Term Goal: go home    Interventions:  - surgery of left shoulder  - manage pain  - monitor vitals and labs  - maximize mobility    - See additional Care Plan goals for specific interventions  Outcome: Progressing  Goal: Patient/Family Short Term Goal  Description: Patient's Short Term Goal: manage pain    Interventions:   - PRN pain medication  - sling  - Ice packs    - See additional Care Plan goals for specific interventions  Outcome: Progressing     Problem: PAIN - ADULT  Goal: Verbalizes/displays adequate comfort level or patient's stated pain goal  Description: INTERVENTIONS:  - Encourage pt to monitor pain and request assistance  - Assess pain using appropriate pain scale  - Administer analgesics based on type and severity of pain and evaluate response  - Implement non-pharmacological measures as appropriate and evaluate response  - Consider cultural and social influences on pain and pain management  - Manage/alleviate anxiety  -  Utilize distraction and/or relaxation techniques  - Monitor for opioid side effects  - Notify MD/LIP if interventions unsuccessful or patient reports new pain  - Anticipate increased pain with activity and pre-medicate as appropriate  Outcome: Progressing     Problem: RISK FOR INFECTION - ADULT  Goal: Absence of fever/infection during anticipated neutropenic period  Description: INTERVENTIONS  - Monitor WBC  - Administer growth factors as ordered  - Implement neutropenic guidelines  Outcome: Progressing     Problem: SAFETY ADULT - FALL  Goal: Free from fall injury  Description: INTERVENTIONS:  - Assess pt frequently for physical needs  - Identify cognitive and physical deficits and behaviors that affect risk of falls.  - [place] fall precautions as indicated by assessment.  - Educate pt/family on patient safety including physical limitations  - Instruct pt to call for assistance with activity based on assessment  - Modify environment to reduce risk of injury  - Provide assistive devices as appropriate  - Consider OT/PT consult to assist with strengthening/mobility  - Encourage toileting schedule  Outcome: Progressing     Problem: DISCHARGE PLANNING  Goal: Discharge to home or other facility with appropriate resources  Description: INTERVENTIONS:  - Identify barriers to discharge w/pt and caregiver  - Include patient/family/discharge partner in discharge planning  - Arrange for needed discharge resources and transportation as appropriate  - Identify discharge learning needs (meds, wound care, etc)  - Arrange for interpreters to assist at discharge as needed  - Consider post-discharge preferences of patient/family/discharge partner  - Complete POLST form as appropriate  - Assess patient's ability to be responsible for managing their own health  - Refer to Case Management Department for coordinating discharge planning if the patient needs post-hospital services based on physician/LIP order or complex needs related to  functional status, cognitive ability or social support system  Outcome: Progressing     Problem: SKIN/TISSUE INTEGRITY - ADULT  Goal: Incision(s), wounds(s) or drain site(s) healing without S/S of infection  Description: INTERVENTIONS:  - Assess and document risk factors for pressure ulcer development  - Assess and document skin integrity  - Assess and document dressing/incision, wound bed, drain sites and surrounding tissue  - Implement wound care per orders  - Initiate isolation precautions as appropriate  - Initiate Pressure Ulcer prevention bundle as indicated  Outcome: Progressing

## 2025-02-16 NOTE — ANESTHESIA PROCEDURE NOTES
Peripheral Block    Date/Time: 2/16/2025 8:28 AM    Performed by: Jamarcus Velasco MD  Authorized by: Jamarcus Velasco MD      General Information and Staff    Start Time:  2/16/2025 8:28 AM  End Time:  2/16/2025 8:30 AM  Anesthesiologist:  Jamarcus Velasco MD  Performed by:  Anesthesiologist  Patient Location:  PACU    Block Placement: Pre Induction  Site Identification: real time ultrasound guided and image stored and retrievable    Block site/laterality marked before start: site marked  Reason for Block: procedure for pain and at request of ED Physician    Preanesthetic Checklist: 2 patient identifers, IV checked, risks and benefits discussed, monitors and equipment checked, pre-op evaluation, timeout performed, anesthesia consent, sterile technique used, no prohibitive neurological deficits and no local skin infection at insertion site      Procedure Details    Patient Position:  Supine  Prep: ChloraPrep    Monitoring:  Cardiac monitor, continuous pulse ox, blood pressure cuff and heart rate  Block Type:  Interscalene  Laterality:  Left  Injection Technique:  Single-shot    Needle    Needle Type:  Short-bevel and echogenic  Needle Gauge:  22 G  Needle Length:  90 mm  Needle Localization:  Nerve stimulator and ultrasound guidance  Reason for Ultrasound Use: appropriate spread of the medication was noted in real time and no ultrasound evidence of intravascular and/or intraneural injection      Muscle Twitch Response: biceps response and deltoid response      Assessment    Injection Assessment:  Good spread noted, negative aspiration for heme, negative resistance, no pain on injection and incremental injection  Paresthesia Pain:  None  Heart Rate Change: No    - Patient tolerated block procedure well without evidence of immediate block related complications.     Medications  2/16/2025 8:28 AM  lidocaine injection 1% - Intradermal   1 mL - 2/16/2025 8:28:00 AM  ropivacaine (NAROPIN) injection 0.5% - Infiltration   30 mL -  2/16/2025 8:28:00 AM  dexamethasone (DECADRON) PF injection 10 mg/ml - Injection   10 mg - 2/16/2025 8:28:00 AM    Additional Comments    Medication:  Ropivacaine 0.5% 30mL

## 2025-02-16 NOTE — ANESTHESIA PREPROCEDURE EVALUATION
Anesthesia PreOp Note    HPI:     Clyde Castro is a 69 year old female who presents for preoperative consultation requested by: Leon Piña MD    Date of Surgery: 2/13/2025 - 2/16/2025    Procedure(s):  LEFT REVERSE TOTAL SHOULDER  Indication: Shoulder fracture, left [S42.92XA]    Relevant Problems   No relevant active problems       NPO:  Last Liquid Consumption Date: 02/16/25  Last Liquid Consumption Time: 0000  Last Solid Consumption Date: 02/16/25  Last Solid Consumption Time: 2100  Last Liquid Consumption Date: 02/16/25          History Review:  Patient Active Problem List    Diagnosis Date Noted    Closed fracture of head of left humerus, initial encounter 02/13/2025    Status post total replacement of left shoulder 02/13/2025    Anemia, unspecified type 02/13/2025    Primary osteoarthritis of left shoulder 02/11/2025    BRYANNA (obstructive sleep apnea) 02/11/2025    Primary osteoarthritis of left hip 02/02/2024    Essential hypertension 02/02/2024    Hyperlipidemia 02/02/2024    Pre-op testing 01/31/2024     (aka Personal history of other malignant neoplasm of skin) 09/18/2014    Cherry angioma 07/03/2014    Solar lentigo 07/03/2014    Intradermal nevus 07/03/2014    Solar aging of skin 07/03/2014    Neoplasm of uncertain behavior of skin 07/03/2014    Family history of ischemic heart disease 10/20/2011       Past Medical History:    Anesthesia complication    heart rates drops in 30's, breathing issues    Aortic stenosis    valve    Back problem    Cancer (HCC)    uterine    Esophageal reflux    Glaucoma screening    sees Dr Thakkar    High blood pressure    High cholesterol    Hx of motion sickness    Muscle weakness    Neuropathy    Osteoarthrosis, unspecified whether generalized or localized, unspecified site    neck - hx C5-6 fx (on aleve)    PONV (postoperative nausea and vomiting)    Raynaud disease    Sleep apnea    CPAP    Visual impairment    readers       Past Surgical History:    Procedure Laterality Date    Appendectomy  1967      1992    Colonoscopy  1980    Eye surgery      Other surgical history  1980    R foot neuroma removal    Other surgical history  2004    L breast lumpectomy    Skin surgery  2014    MMS for BCC superfic to L superior nasal ala    Spine surgery procedure unlisted      cervical fusions       Prescriptions Prior to Admission[1]  Current Medications and Prescriptions Ordered in Epic[2]    Allergies[3]    Family History   Problem Relation Age of Onset    Heart Disorder Father 47        CAD/MI    Other Mother         kidney stones, hypothyroidism    Diabetes Mother     Other Sister 55        CVA    Obesity Sister     Hypertension Neg     Lipids Neg     Cancer Neg      Social History     Socioeconomic History    Marital status: Single   Tobacco Use    Smoking status: Former     Current packs/day: 1.00     Average packs/day: 1 pack/day for 7.0 years (7.0 ttl pk-yrs)     Types: Cigarettes    Smokeless tobacco: Never    Tobacco comments:     quit over 30 years ago   Vaping Use    Vaping status: Never Used   Substance and Sexual Activity    Alcohol use: Yes     Comment: 1 x weekly    Drug use: Not Currently       Available pre-op labs reviewed.  Lab Results   Component Value Date    WBC 10.3 2025    RBC 3.92 2025    HGB 11.3 (L) 2025    HCT 34.0 (L) 2025    MCV 86.7 2025    MCH 28.8 2025    MCHC 33.2 2025    RDW 13.2 2025    .0 2025     Lab Results   Component Value Date     (L) 2025    K 4.0 2025     2025    CO2 26.0 2025    BUN 23 2025    CREATSERUM 0.73 2025    GLU 94 2025    CA 8.7 2025          Vital Signs:  Body mass index is 37.38 kg/m².   height is 1.6 m (5' 3\") and weight is 95.7 kg (211 lb). Her oral temperature is 98.6 °F (37 °C). Her blood pressure is 138/78 and her pulse is 77. Her respiration is  20 and oxygen saturation is 96%.   Vitals:    02/15/25 1910 02/15/25 2007 02/16/25 0300 02/16/25 0454   BP:  118/69  138/78   Pulse: 94  64 77   Resp:  18  20   Temp:  98.7 °F (37.1 °C)  98.6 °F (37 °C)   TempSrc:  Oral  Oral   SpO2:  97%  96%   Weight:       Height:            Anesthesia Evaluation     Patient summary reviewed and Nursing notes reviewed    History of anesthetic complications   Airway   Mallampati: III  TM distance: <3 FB  Neck ROM: full  Dental      Pulmonary - normal exam   (+) sleep apnea  Cardiovascular - normal exam  Exercise tolerance: good  (+) hypertension    ROS comment: HLD    Neuro/Psych    (+)  neuromuscular disease,        Comments: C-spine fusion    GI/Hepatic/Renal    (+) GERD    Endo/Other    (+) arthritis  Abdominal  - normal exam  (+) obese (Erthematous and itchy underneath the breasts/swollen. probably from ekg leads.)     Other findings: Decreased strength and numbness and tinglings in bilateral upper extremities, worse on right side.            Anesthesia Plan:   ASA:  3  Plan:   General and regional  Monitors and Lines:   Additonal IV  Airway:  ETT  Post-op Pain Management: Interscalene block and IV analgesics  Informed Consent Plan and Risks Discussed With:  Patient  Use of Blood Products Discussed With:  Patient  Blood Product Use Consented        I have informed Clyde Castro and/or legal guardian or family member of the nature of the anesthetic plan, benefits, risks including possible dental damage if relevant, major complications, and any alternative forms of anesthetic management.   All of the patient's questions were answered to the best of my ability. The patient desires the anesthetic management as planned.  Jamarcus Velasco MD  2/16/2025 8:18 AM  Present on Admission:  **None**           [1]   Medications Prior to Admission   Medication Sig Dispense Refill Last Dose/Taking    Doxycycline Monohydrate 100 MG Oral Tab Take 100 mg by mouth 2 (two) times daily. for infection  prevention.   2/12/2025 at  8:00 PM    HYDROcodone-acetaminophen 5-325 MG Oral Tab Take 1 tablet by mouth every 4 to 6 hours as needed for Pain.   2/12/2025    aspirin 81 MG Oral Tab EC Take 1 tablet (81 mg total) by mouth 2 (two) times daily.   2/12/2025 at  8:00 PM    atorvastatin 20 MG Oral Tab Take 1 tablet (20 mg total) by mouth nightly.   2/12/2025 at  8:00 PM    lisinopril 10 MG Oral Tab Take 1 tablet (10 mg total) by mouth daily.   2/12/2025 at  8:00 AM    Naproxen Sodium (ALEVE) 220 MG Oral Tab Take 2 tablets (440 mg total) by mouth 2 (two) times daily.   2/13/2025 at  5:00 AM   [2]   Current Facility-Administered Medications Ordered in Epic   Medication Dose Route Frequency Provider Last Rate Last Admin    [Transfer Hold] magnesium hydroxide (Milk of Magnesia) 400 MG/5ML oral suspension 30 mL  30 mL Oral Daily PRN Vaishnavi Trammell MD   30 mL at 02/14/25 1314    [Held by provider] heparin (Porcine) 5000 UNIT/ML injection 5,000 Units  5,000 Units Subcutaneous 2 times per day Vaishnavi Trammell MD   5,000 Units at 02/15/25 0820    [Transfer Hold] acetaminophen (Tylenol Extra Strength) tab 500 mg  500 mg Oral Q4H PRN Vaishnavi Trammell MD        [Transfer Hold] acetaminophen (Tylenol) tab 650 mg  650 mg Oral Q4H PRN Vaishnavi Trammell MD        Or    [Transfer Hold] HYDROcodone-acetaminophen (Norco) 5-325 MG per tab 1 tablet  1 tablet Oral Q4H PRN Vaishnavi Trammell MD   2 tablet at 02/13/25 1704    Or    [Transfer Hold] HYDROcodone-acetaminophen (Norco) 5-325 MG per tab 2 tablet  2 tablet Oral Q4H PRN Vaishnavi Trammell MD   2 tablet at 02/16/25 0445    [Transfer Hold] morphINE PF 2 MG/ML injection 1 mg  1 mg Intravenous Q2H PRN Vaishnavi Trammell MD        Or    [Transfer Hold] morphINE PF 2 MG/ML injection 2 mg  2 mg Intravenous Q2H PRN Vaishnavi Trammell MD   2 mg at 02/13/25 2227    Or    [Transfer Hold] morphINE PF 4 MG/ML injection 4 mg  4 mg Intravenous Q2H PRN Vaishnavi Trammell MD         [Transfer Hold] melatonin tab 3 mg  3 mg Oral Nightly PRN Viashnavi Trammell MD        [Transfer Hold] polyethylene glycol (PEG 3350) (Miralax) 17 g oral packet 17 g  17 g Oral Daily PRN Vaishnavi Trammell MD   17 g at 02/14/25 0757    [Transfer Hold] bisacodyl (Dulcolax) 10 MG rectal suppository 10 mg  10 mg Rectal Daily PRN Vaishnavi Trammell MD   10 mg at 02/13/25 1947    [Transfer Hold] fleet enema (Fleet) rectal enema 133 mL  1 enema Rectal Once PRN Vaishnavi Trammell MD        [Transfer Hold] senna-docusate (Senokot-S) 8.6-50 MG per tab 2 tablet  2 tablet Oral Daily Vaishnavi Trammell MD   2 tablet at 02/15/25 0820    [Transfer Hold] ondansetron (Zofran) 4 MG/2ML injection 4 mg  4 mg Intravenous Q6H PRN Vaishnavi Trammell MD        [Transfer Hold] metoclopramide (Reglan) 5 mg/mL injection 10 mg  10 mg Intravenous Q8H PRN Vaishnavi Trammell MD        [Transfer Hold] glycerin-hypromellose- (Artificial Tears) 0.2-0.2-1 % ophthalmic solution 1 drop  1 drop Both Eyes QID PRN Vaishnavi Trammell MD        [Transfer Hold] sodium chloride (Saline Mist) 0.65 % nasal solution 1 spray  1 spray Each Nare Q3H PRN Vaishnavi Trammell MD        doxycycline (Vibramycin) cap 100 mg  100 mg Oral BID Vaishnavi Trammell MD   100 mg at 02/15/25 2013     No current Epic-ordered outpatient medications on file.   [3]   Allergies  Allergen Reactions    Apple SWELLING     Fresh apples, swelling in the mouth    Beer HIVES    Cat Hair Extract SHORTNESS OF BREATH and OTHER (SEE COMMENTS)     welts    Cherry SWELLING     swelling in the toungue    Ibuprofen BLEEDING     Causes severe bruising     Iodine (Topical) HIVES    Meclizine OTHER (SEE COMMENTS)     Heart issues    Red Wine Extract HIVES    Shellfish HIVES    Benadryl [Diphenhydramine] OTHER (SEE COMMENTS)     Cardiac disturbances    Cyclobenzaprine OTHER (SEE COMMENTS)     Cardiac disturbances    Dander OTHER (SEE COMMENTS)     welts    Mold OTHER  (SEE COMMENTS)     Coughing and sneezing    Pollen OTHER (SEE COMMENTS)     Coughing and sneezing    Vinegar HIVES    Radiology Contrast Iodinated Dyes RASH    Seasonal Runny nose    Sulfa Antibiotics ITCHING

## 2025-02-17 LAB
ALBUMIN SERPL-MCNC: 4.3 G/DL (ref 3.2–4.8)
ANION GAP SERPL CALC-SCNC: 12 MMOL/L (ref 0–18)
BUN BLD-MCNC: 17 MG/DL (ref 9–23)
BUN/CREAT SERPL: 22.7 (ref 10–20)
CALCIUM BLD-MCNC: 9 MG/DL (ref 8.7–10.4)
CHLORIDE SERPL-SCNC: 100 MMOL/L (ref 98–112)
CO2 SERPL-SCNC: 23 MMOL/L (ref 21–32)
CREAT BLD-MCNC: 0.75 MG/DL
DEPRECATED RDW RBC AUTO: 41.1 FL (ref 35.1–46.3)
EGFRCR SERPLBLD CKD-EPI 2021: 86 ML/MIN/1.73M2 (ref 60–?)
ERYTHROCYTE [DISTWIDTH] IN BLOOD BY AUTOMATED COUNT: 12.8 % (ref 11–15)
GLUCOSE BLD-MCNC: 138 MG/DL (ref 70–99)
HCT VFR BLD AUTO: 32.3 %
HGB BLD-MCNC: 10.7 G/DL
MAGNESIUM SERPL-MCNC: 2 MG/DL (ref 1.6–2.6)
MCH RBC QN AUTO: 29.1 PG (ref 26–34)
MCHC RBC AUTO-ENTMCNC: 33.1 G/DL (ref 31–37)
MCV RBC AUTO: 87.8 FL
OSMOLALITY SERPL CALC.SUM OF ELEC: 284 MOSM/KG (ref 275–295)
PHOSPHATE SERPL-MCNC: 3.6 MG/DL (ref 2.4–5.1)
PLATELET # BLD AUTO: 279 10(3)UL (ref 150–450)
POTASSIUM SERPL-SCNC: 4.2 MMOL/L (ref 3.5–5.1)
RBC # BLD AUTO: 3.68 X10(6)UL
SODIUM SERPL-SCNC: 135 MMOL/L (ref 136–145)
WBC # BLD AUTO: 10.8 X10(3) UL (ref 4–11)

## 2025-02-17 PROCEDURE — 99233 SBSQ HOSP IP/OBS HIGH 50: CPT | Performed by: HOSPITALIST

## 2025-02-17 NOTE — CONSULTS
PHYSICAL MEDICINE AND REHABILITATION CONSULTATION     CC: Impaired mobility and ADL dysfunction secondary to fall with resulting left shoulder periprosthetic proximal humerus fracture status post Left reverse total shoulder arthroplasty (12/16/2025, Dr Leon Betts)    HPI: This is a 69-year-old right-handed right-handed female with past medical history of hypertension, hyperlipidemia, aortic stenosis, obstructive sleep apnea, remote cervical spine injury related to fall requiring cervical spinal fusion, spinal stenosis, gastroesophageal reflux disease, glaucoma, uterine cancer, osteoarthritis, left anterior total hip arthroplasty (2/2024), recent left total shoulder arthroplasty (2/11/2025, Dr Leon Betts, orthopedic surgery, [facility]) followed by discharge to home on 2/12/2025.  Patient presented to [facility] emergency department and was admitted on 2/13/2025 for evaluation and management of fall with findings of left shoulder periprosthetic fracture involving humeral head.    Hospital course:  -Orthopedic surgical management of left total shoulder arthroplasty periprosthetic fracture involving humeral head: Left reverse total shoulder arthroplasty (12/16/2025, Dr Leon Betts)  -DVT chemoprophylaxis heparin 5000 units subcutaneously twice daily      PM&R has now been consulted in order to assess patient's functional status and make recommendations for rehabilitation.    ROS:  As per history of present illness and including impaired mobility and self-care.    Current medications:   sodium chloride 0.9 % IV bolus 500 mL  500 mL Intravenous Once PRN    sennosides (Senokot) tab 17.2 mg  17.2 mg Oral Nightly    docusate sodium (Colace) cap 100 mg  100 mg Oral BID    polyethylene glycol (PEG 3350) (Miralax) 17 g oral packet 17 g  17 g Oral Daily PRN    magnesium hydroxide (Milk of Magnesia) 400 MG/5ML oral suspension 30 mL  30 mL Oral Daily PRN    bisacodyl (Dulcolax) 10 MG rectal  suppository 10 mg  10 mg Rectal Daily PRN    fleet enema (Fleet) rectal enema 133 mL  1 enema Rectal Once PRN    ondansetron (Zofran) 4 MG/2ML injection 4 mg  4 mg Intravenous Q6H PRN    metoclopramide (Reglan) 5 mg/mL injection 10 mg  10 mg Intravenous Q8H PRN    aspirin DR tab 81 mg  81 mg Oral BID    oxyCODONE immediate release tab 2.5 mg  2.5 mg Oral Q4H PRN    Or    oxyCODONE immediate release tab 5 mg  5 mg Oral Q4H PRN    HYDROmorphone (Dilaudid) 1 MG/ML injection 0.2 mg  0.2 mg Intravenous Q2H PRN    Or    HYDROmorphone (Dilaudid) 1 MG/ML injection 0.4 mg  0.4 mg Intravenous Q2H PRN    [COMPLETED] ceFAZolin (Ancef) 2g in 10mL IV syringe premix  2 g Intravenous Q8H    [COMPLETED] acetaminophen (Ofirmev) 10 mg/mL infusion premix 1,000 mg  1,000 mg Intravenous Once    [Transfer Hold] magnesium hydroxide (Milk of Magnesia) 400 MG/5ML oral suspension 30 mL  30 mL Oral Daily PRN    [COMPLETED] fluconazole (Diflucan) tab 150 mg  150 mg Oral Once    [COMPLETED] HYDROcodone-acetaminophen (Norco) 5-325 MG per tab 1 tablet  1 tablet Oral Once    [COMPLETED] HYDROcodone-acetaminophen (Norco) 5-325 MG per tab 1 tablet  1 tablet Oral Once    [Held by provider] heparin (Porcine) 5000 UNIT/ML injection 5,000 Units  5,000 Units Subcutaneous 2 times per day    [Transfer Hold] acetaminophen (Tylenol Extra Strength) tab 500 mg  500 mg Oral Q4H PRN    [Transfer Hold] acetaminophen (Tylenol) tab 650 mg  650 mg Oral Q4H PRN    Or    [Transfer Hold] HYDROcodone-acetaminophen (Norco) 5-325 MG per tab 1 tablet  1 tablet Oral Q4H PRN    Or    [Transfer Hold] HYDROcodone-acetaminophen (Norco) 5-325 MG per tab 2 tablet  2 tablet Oral Q4H PRN    [Transfer Hold] morphINE PF 2 MG/ML injection 1 mg  1 mg Intravenous Q2H PRN    Or    [Transfer Hold] morphINE PF 2 MG/ML injection 2 mg  2 mg Intravenous Q2H PRN    Or    [Transfer Hold] morphINE PF 4 MG/ML injection 4 mg  4 mg Intravenous Q2H PRN    [Transfer Hold] melatonin tab 3 mg  3 mg Oral  Nightly PRN    [Transfer Hold] polyethylene glycol (PEG 3350) (Miralax) 17 g oral packet 17 g  17 g Oral Daily PRN    [Transfer Hold] bisacodyl (Dulcolax) 10 MG rectal suppository 10 mg  10 mg Rectal Daily PRN    [Transfer Hold] fleet enema (Fleet) rectal enema 133 mL  1 enema Rectal Once PRN    [Transfer Hold] senna-docusate (Senokot-S) 8.6-50 MG per tab 2 tablet  2 tablet Oral Daily    [Transfer Hold] ondansetron (Zofran) 4 MG/2ML injection 4 mg  4 mg Intravenous Q6H PRN    [Transfer Hold] metoclopramide (Reglan) 5 mg/mL injection 10 mg  10 mg Intravenous Q8H PRN    [Transfer Hold] glycerin-hypromellose- (Artificial Tears) 0.2-0.2-1 % ophthalmic solution 1 drop  1 drop Both Eyes QID PRN    [Transfer Hold] sodium chloride (Saline Mist) 0.65 % nasal solution 1 spray  1 spray Each Nare Q3H PRN    doxycycline (Vibramycin) cap 100 mg  100 mg Oral BID       Allergies:  Allergies[1]    Past Medical History:  Past Medical History:    Anesthesia complication    heart rates drops in 30's, breathing issues    Aortic stenosis    valve    Back problem    Cancer (HCC)    uterine    Esophageal reflux    Glaucoma screening    sees Dr Thakkar    High blood pressure    High cholesterol    Hx of motion sickness    Muscle weakness    Neuropathy    Osteoarthrosis, unspecified whether generalized or localized, unspecified site    neck - hx C5-6 fx (on aleve)    PONV (postoperative nausea and vomiting)    Raynaud disease    Sleep apnea    CPAP    Visual impairment    readers       Past Surgical History:  Past Surgical History:   Procedure Laterality Date    Appendectomy  1967      1992    Colonoscopy  1980    Eye surgery      Other surgical history  1980    R foot neuroma removal    Other surgical history  2004    L breast lumpectomy    Skin surgery  2014    MMS for BCC superfic to L superior nasal ala    Spine surgery procedure unlisted      cervical fusions       Family History:    Family History   Problem Relation Age of Onset    Heart Disorder Father 47        CAD/MI    Other Mother         kidney stones, hypothyroidism    Diabetes Mother     Other Sister 55        CVA    Obesity Sister     Hypertension Neg     Lipids Neg     Cancer Neg        HOME SITUATION  Type of Home: House  Home Layout: One level  Stairs to Enter : 2   Railing: Yes              Lives With: Alone (granddaughter to assist for 3 days after discharge; supportive boyfriend)    Drives: Yes   Patient Regularly Uses:  (owns p/u walker, rollator; walking stick)     Current functional status-  Precautions: Limb alert - left (sling AAT)  Fall Risk: High fall risk     WEIGHT BEARING RESTRICTION  L Upper Extremity: Non-Weight Bearing    Mobility as per physical therapist on 2/17/2025:  COGNITION  Overall Cognitive Status:  WFL - within functional limits  Safety Judgement:  decreased awareness of need for safety  Awareness of Deficits:  decreased awareness of deficits     RANGE OF MOTION AND STRENGTH ASSESSMENT  Upper extremity ROM and strength are impaired- LUE in immobilizer  Lower extremity ROM is within functional limits   Lower extremity strength is impaired bilateral LE's: 4/5     BALANCE  Static Sitting: Good  Dynamic Sitting: Fair +  Static Standing: Poor +  Dynamic Standing: Poor    FUNCTIONAL ABILITY STATUS  Functional Mobility/Gait Assessment  Gait Assistance: Minimum assistance  Distance (ft): 100ft  Assistive Device: Cane (SBQC)  Pattern: Shuffle (decreased lucinda speed, increased lateral weight shift, decreased step length, guarded posture. Cues for safe management of quad cane). Patient intermittently reaching for wall to request standing rest break.   Sit to Stand: contact guard assist. Cues for appropriate RUE positioning with transitional movements. Instruction on pacing upon standing to allow body time to acclimate to change in position. Min A for static standing balance.    Self-care as per occupational  therapist on 2/17/2025:  COGNITION  Overall Cognitive Status:  WFL - within functional limits     RANGE OF MOTION   Upper extremity ROM is within functional limits except L UE immobilized in sling     STRENGTH ASSESSMENT  Upper extremity strength is within functional limits except L UE NWB    BED MOBILITY  Not Tested - patient in chair upon therapist arrival     FUNCTIONAL TRANSFER ASSESSMENT  Sit to Stand from EOB: contact guard assist  Toilet Transfer: contact guard assist  Chair Transfer: contact guard assist     FUNCTIONAL MOBILITY  contact guard assist to min assist for in-room fx mobility using SBQC for R UE support  Comments:   Patient reported she has balance deficits at baseline.      ACTIVITIES OF DAILY LIVING  Eating: setup assist (per obs)   Grooming: contact guard assist standing at sink  UB Dressing: mod assist (per obs)   LB Dressing: contact guard assist for standing balance during LB clothing mgmt  Toileting: supervision seated        Primary insurance: Medicare    FULL CODE    OBJECTIVE:    /59 (BP Location: Right arm)   Pulse 77   Temp 97.9 °F (36.6 °C) (Oral)   Resp 16   Ht 5' 3\" (1.6 m)   Wt 211 lb (95.7 kg)   SpO2 96%   BMI 37.38 kg/m²   General: Patient sitting at bedside in chair in no acute distress, friend present in room at bedside.  Head: Normocephalic, atraumatic, without obvious abnormality  Eyes: Conjunctiva/lids without erythema/icterus, EOMI  E/N/T: No scars noted on bilateral ears, Lips normal,  Neck: Supple, symmetrical  Lungs: Non labored on RA, , No accessory muscle noted  Heart: Well-perfused.  Abdomen: Non-distended.   Extrems: Left shoulder sling immobilizer.  Skin: Skin color/turgor appear normal.  Psych: Awake,alert, oriented; normal mood and affect  MSK: Manual muscle testing reveals active movement against gravity tolerating resistance throughout bilateral lower extremities and right upper extremity.  Left upper extremity left upper extremity strength exam  deferred due to arm sling shoulder immobilizer.  Neuro: CN 2-12 grossly intact, sensation intact to LT, proprioception at bilateral toes intact; Speech clear and fluent    Data Review:    Lab Results   Component Value Date    WBC 10.8 02/17/2025    HGB 10.7 02/17/2025    HCT 32.3 02/17/2025    .0 02/17/2025    CREATSERUM 0.75 02/17/2025    BUN 17 02/17/2025     02/17/2025    K 4.2 02/17/2025     02/17/2025    CO2 23.0 02/17/2025     02/17/2025    CA 9.0 02/17/2025    ALB 4.3 02/17/2025    MG 2.0 02/17/2025    PHOS 3.6 02/17/2025       ASSESSMENT:    REHAB DIAGNOSIS:  1. Impaired mobility/ADL dysfunction secondary to fall with resulting left shoulder periprosthetic proximal humerus fracture status post Left reverse total shoulder arthroplasty (12/16/2025, Dr Leon Betts)  2.  Osteoarthritis with recent left total shoulder arthroplasty (2/11/2025, Dr Leon Betts, orthopedic surgery, [facility])       IMPAIRMENTS: ADLs, functional mobility, balance,  strength, endurance, self care, transfers, hygiene    RECOMMENDATIONS:    Continue bedside rehabilitation services while undergoing ongoing acute medical management.  Patient is appropriate for acute inpatient rehabilitation when medically stable utilizing comprehensive rehabilitation services consisting of occupational therapy, physical therapy, neuropsychology.    Additionally, 24 hr rehab nursing also beneficial for medication management, pressure sore prevention, bowel and bladder management, skin management. Physiatric medical oversight to monitor anemia, cardiac function, pain management, DVT prevention. ELOS 2 weeks. Rehab potential is good     DC goal is home with family at a standby assistance/modified independent level.    Will continue to follow for rehabilitation needs during this hospitalization and make further recommendations as indicated based on any changes in findings, treatment and/or progress..     Thank you  for this consultation,  Lopez Wang III,, MD [facility]            [1]   Allergies  Allergen Reactions    Apple SWELLING     Fresh apples, swelling in the mouth    Beer HIVES    Cat Hair Extract SHORTNESS OF BREATH and OTHER (SEE COMMENTS)     welts    Cherry SWELLING     swelling in the toungue    Ibuprofen BLEEDING     Causes severe bruising     Iodine (Topical) HIVES    Meclizine OTHER (SEE COMMENTS)     Heart issues    Red Wine Extract HIVES    Shellfish HIVES    Benadryl [Diphenhydramine] OTHER (SEE COMMENTS)     Cardiac disturbances    Cyclobenzaprine OTHER (SEE COMMENTS)     Cardiac disturbances    Dander OTHER (SEE COMMENTS)     welts    Mold OTHER (SEE COMMENTS)     Coughing and sneezing    Pollen OTHER (SEE COMMENTS)     Coughing and sneezing    Vinegar HIVES    Radiology Contrast Iodinated Dyes RASH    Seasonal Runny nose    Sulfa Antibiotics ITCHING

## 2025-02-17 NOTE — PLAN OF CARE
Pt is Shilpa. RA. CPAP @. Remote tele. ASA for dvt prophylaxis. Voiding freely. PRN oxy for pain management. Up standby assist. General diet. Saline locked. Gel ice. Plan for OT eval tomorrow.    Problem: Patient Centered Care  Goal: Patient preferences are identified and integrated in the patient's plan of care  Description: Interventions:  - What would you like us to know as we care for you?   - Provide timely, complete, and accurate information to patient/family  - Incorporate patient and family knowledge, values, beliefs, and cultural backgrounds into the planning and delivery of care  - Encourage patient/family to participate in care and decision-making at the level they choose  - Honor patient and family perspectives and choices  Outcome: Progressing     Problem: Patient/Family Goals  Goal: Patient/Family Long Term Goal  Description: Patient's Long Term Goal: go home    Interventions:  - surgery of left shoulder  - manage pain  - monitor vitals and labs  - maximize mobility    - See additional Care Plan goals for specific interventions  Outcome: Progressing  Goal: Patient/Family Short Term Goal  Description: Patient's Short Term Goal: manage pain    Interventions:   - PRN pain medication  - sling  - Ice packs    - See additional Care Plan goals for specific interventions  Outcome: Progressing     Problem: PAIN - ADULT  Goal: Verbalizes/displays adequate comfort level or patient's stated pain goal  Description: INTERVENTIONS:  - Encourage pt to monitor pain and request assistance  - Assess pain using appropriate pain scale  - Administer analgesics based on type and severity of pain and evaluate response  - Implement non-pharmacological measures as appropriate and evaluate response  - Consider cultural and social influences on pain and pain management  - Manage/alleviate anxiety  - Utilize distraction and/or relaxation techniques  - Monitor for opioid side effects  - Notify MD/LIP if interventions unsuccessful  or patient reports new pain  - Anticipate increased pain with activity and pre-medicate as appropriate  Outcome: Progressing     Problem: RISK FOR INFECTION - ADULT  Goal: Absence of fever/infection during anticipated neutropenic period  Description: INTERVENTIONS  - Monitor WBC  - Administer growth factors as ordered  - Implement neutropenic guidelines  Outcome: Progressing     Problem: SAFETY ADULT - FALL  Goal: Free from fall injury  Description: INTERVENTIONS:  - Assess pt frequently for physical needs  - Identify cognitive and physical deficits and behaviors that affect risk of falls.  - [place] fall precautions as indicated by assessment.  - Educate pt/family on patient safety including physical limitations  - Instruct pt to call for assistance with activity based on assessment  - Modify environment to reduce risk of injury  - Provide assistive devices as appropriate  - Consider OT/PT consult to assist with strengthening/mobility  - Encourage toileting schedule  Outcome: Progressing     Problem: DISCHARGE PLANNING  Goal: Discharge to home or other facility with appropriate resources  Description: INTERVENTIONS:  - Identify barriers to discharge w/pt and caregiver  - Include patient/family/discharge partner in discharge planning  - Arrange for needed discharge resources and transportation as appropriate  - Identify discharge learning needs (meds, wound care, etc)  - Arrange for interpreters to assist at discharge as needed  - Consider post-discharge preferences of patient/family/discharge partner  - Complete POLST form as appropriate  - Assess patient's ability to be responsible for managing their own health  - Refer to Case Management Department for coordinating discharge planning if the patient needs post-hospital services based on physician/LIP order or complex needs related to functional status, cognitive ability or social support system  Outcome: Progressing     Problem: SKIN/TISSUE INTEGRITY -  ADULT  Goal: Incision(s), wounds(s) or drain site(s) healing without S/S of infection  Description: INTERVENTIONS:  - Assess and document risk factors for pressure ulcer development  - Assess and document skin integrity  - Assess and document dressing/incision, wound bed, drain sites and surrounding tissue  - Implement wound care per orders  - Initiate isolation precautions as appropriate  - Initiate Pressure Ulcer prevention bundle as indicated  Outcome: Progressing

## 2025-02-17 NOTE — PLAN OF CARE
Alert and oriented x4. Post-op day #1. Dressing in place to L shoulder/immobilizer. Monitoring vital signs- stable at this time. No acute changes noted throughout shift. SCDs and ASA for DVT prophylaxis. Pain medication provided as needed. Up with standby assist. Fall precautions maintained- bed alarm on, bed locked in lowest position, call light and personal belongings within reach, non-skid socks in place to bilateral feet. Frequent rounding by nursing staff. Plan acute rehab-- pending bed availability at [facility] or second choice    Problem: Patient Centered Care  Goal: Patient preferences are identified and integrated in the patient's plan of care  Description: Interventions:  - What would you like us to know as we care for you?   - Provide timely, complete, and accurate information to patient/family  - Incorporate patient and family knowledge, values, beliefs, and cultural backgrounds into the planning and delivery of care  - Encourage patient/family to participate in care and decision-making at the level they choose  - Honor patient and family perspectives and choices  Outcome: Progressing     Problem: Patient/Family Goals  Goal: Patient/Family Long Term Goal  Description: Patient's Long Term Goal: go home    Interventions:  - surgery of left shoulder  - manage pain  - monitor vitals and labs  - maximize mobility    - See additional Care Plan goals for specific interventions  Outcome: Progressing  Goal: Patient/Family Short Term Goal  Description: Patient's Short Term Goal: manage pain    Interventions:   - PRN pain medication  - sling  - Ice packs    - See additional Care Plan goals for specific interventions  Outcome: Progressing     Problem: PAIN - ADULT  Goal: Verbalizes/displays adequate comfort level or patient's stated pain goal  Description: INTERVENTIONS:  - Encourage pt to monitor pain and request assistance  - Assess pain using appropriate pain scale  - Administer analgesics based on type and  severity of pain and evaluate response  - Implement non-pharmacological measures as appropriate and evaluate response  - Consider cultural and social influences on pain and pain management  - Manage/alleviate anxiety  - Utilize distraction and/or relaxation techniques  - Monitor for opioid side effects  - Notify MD/LIP if interventions unsuccessful or patient reports new pain  - Anticipate increased pain with activity and pre-medicate as appropriate  Outcome: Progressing     Problem: RISK FOR INFECTION - ADULT  Goal: Absence of fever/infection during anticipated neutropenic period  Description: INTERVENTIONS  - Monitor WBC  - Administer growth factors as ordered  - Implement neutropenic guidelines  Outcome: Progressing     Problem: SAFETY ADULT - FALL  Goal: Free from fall injury  Description: INTERVENTIONS:  - Assess pt frequently for physical needs  - Identify cognitive and physical deficits and behaviors that affect risk of falls.  - [place] fall precautions as indicated by assessment.  - Educate pt/family on patient safety including physical limitations  - Instruct pt to call for assistance with activity based on assessment  - Modify environment to reduce risk of injury  - Provide assistive devices as appropriate  - Consider OT/PT consult to assist with strengthening/mobility  - Encourage toileting schedule  Outcome: Progressing     Problem: DISCHARGE PLANNING  Goal: Discharge to home or other facility with appropriate resources  Description: INTERVENTIONS:  - Identify barriers to discharge w/pt and caregiver  - Include patient/family/discharge partner in discharge planning  - Arrange for needed discharge resources and transportation as appropriate  - Identify discharge learning needs (meds, wound care, etc)  - Arrange for interpreters to assist at discharge as needed  - Consider post-discharge preferences of patient/family/discharge partner  - Complete POLST form as appropriate  - Assess patient's ability to  be responsible for managing their own health  - Refer to Case Management Department for coordinating discharge planning if the patient needs post-hospital services based on physician/LIP order or complex needs related to functional status, cognitive ability or social support system  Outcome: Progressing     Problem: SKIN/TISSUE INTEGRITY - ADULT  Goal: Incision(s), wounds(s) or drain site(s) healing without S/S of infection  Description: INTERVENTIONS:  - Assess and document risk factors for pressure ulcer development  - Assess and document skin integrity  - Assess and document dressing/incision, wound bed, drain sites and surrounding tissue  - Implement wound care per orders  - Initiate isolation precautions as appropriate  - Initiate Pressure Ulcer prevention bundle as indicated  Outcome: Progressing

## 2025-02-17 NOTE — CM/SW NOTE
02/17/25 1600   CM/SW Referral Data   Referral Source Physician;Social Work (self-referral)   Reason for Referral Discharge planning   Informant Patient   Patient Info   Patient's Current Mental Status at Time of Assessment Alert;Oriented   Patient's Home Environment House   Number of Levels in Home 1   Patient lives with Grandchild  (supportive boyfriend)   Patient Status Prior to Admission   Independent with ADLs and Mobility Yes   Services in place prior to admission Home Health Care   Discharge Needs   Anticipated D/C needs Acute rehab   Services Requested   Submitted to [facility] n/a   PMR Consult Requested Consult ordered   Choice of Post-Acute Provider   Informed patient of right to choose their preferred provider Yes   List of appropriate post-acute services provided to patient/family with quality data Yes     SOREN discussed pt during rounds. MD notified SOREN that pt would like [facility]    PMR consulted - agreeable to acute    Referrals sent in aidin for acute rehab    [facility] stating they have no beds for 2-3 days    Will need follow up on choice/ bed avail    Addendum, 2/18/2025    SOREN confirmed with [facility] that  doesn't have beds for 2-3 days. SOREN reviewed referral - no other accepting acute rehab provider at this time. [facility] and [facility] do not think pt meets criteria to be admitted to their facilities    Currently only  is able to accept - SOREN reopened the referral and asked for more acute facilities to review to see if they can accept    UPDATE:     SOREN spoke with pt at length with sig other at bedside for a back up plan.     Pt stating she needs acute rehab at this time - that she cannot do outpatient PT or day rehab as going in and out of the house is too difficult for her. Pt stating she will not go with an [facility] or [facility] facility due pt past legal issues with them.     Currently still no accepting acute rehab facility bedsides [facility] is stating no bed until Maybe Thursday/ Friday.     Pt  stating if she absolutely needs to leave before [facility] has a bed - she will \"go home and figure it out\" and declines HHC.     Will need to continue to follow up on discharge plan.    SW/CM to remain available for support and/or discharge planning.     PLAN: Acute rehab - [facility] only accepting facility - no bed till Thurs/ Friday vs Home - self care (declining HHC or OPPT)     Deidre VIVEROS LSW, MSW ext. [phone]

## 2025-02-17 NOTE — PHYSICAL THERAPY NOTE
PHYSICAL THERAPY EVALUATION - INPATIENT     Room Number: 403/403-A  Evaluation Date: 2/17/2025  Type of Evaluation: Initial   Physician Order: PT Eval and Treat    Presenting Problem: L shoulder fracture requiring L reverse TSA on 2/16/25  Co-Morbidities : Hx recent L shoulder replacement 2/11, cord injury per pt, R knee buckling at baseline  Reason for Therapy: Mobility Dysfunction and Discharge Planning    PHYSICAL THERAPY ASSESSMENT   Patient is a 69 year old female admitted 2/13/2025 for L shoulder fracture requiring L reverse TSA on 2/16/25.  Prior to admission, patient's baseline is recovering from L shoulder replacement (on 2/11) with g.dtr support for ADL's and IADL's, holds onto walls for support and walking stick.  Patient is currently functioning below baseline with bed mobility, transfers, gait, stair negotiation, maintaining seated position, standing prolonged periods, and performing household tasks.  Patient is requiring contact guard assist and minimal assist as a result of the following impairments: decreased functional strength, decreased endurance/aerobic capacity, pain, impaired standing balance, decreased muscular endurance, and limited LUE strength and  ROM.  Physical Therapy will continue to follow for duration of hospitalization.    Patient will benefit from continued skilled PT Services to facilitate return to prior level of function as patient demonstrates high motivation with excellent tolerance to an intensive therapy program .    PLAN DURING HOSPITALIZATION  Nursing Mobility Recommendation : 1 Assist  PT Device Recommendation: SBQC;Gait belt  PT Treatment Plan: Bed mobility;Body mechanics;Endurance;Energy conservation;Patient education;Family education;Gait training;Range of motion;Strengthening;Stoop training;Stair training;Transfer training;Balance training  Rehab Potential : Good  Frequency (Obs): Daily     PHYSICAL THERAPY MEDICAL/SOCIAL HISTORY   Problem List  Principal Problem:     Closed fracture of head of left humerus, initial encounter  Active Problems:    Status post total replacement of left shoulder    Anemia, unspecified type      HOME SITUATION  Type of Home: House  Home Layout: One level  Stairs to Enter : 2   Railing: Yes              Lives With: Alone (granddaughter to assist for 3 days after discharge; supportive boyfriend)    Drives: Yes   Patient Regularly Uses:  (owns p/u walker, rollator; walking stick)     SUBJECTIVE  \"I have a balance issue\"     PHYSICAL THERAPY EXAMINATION   OBJECTIVE  Precautions: Limb alert - left (sling AAT)  Fall Risk: High fall risk    WEIGHT BEARING RESTRICTION  L Upper Extremity: Non-Weight Bearing      PAIN ASSESSMENT  Ratin  Location: LUE  Management Techniques: Activity promotion;Body mechanics;Repositioning    COGNITION  Overall Cognitive Status:  WFL - within functional limits  Safety Judgement:  decreased awareness of need for safety  Awareness of Deficits:  decreased awareness of deficits    RANGE OF MOTION AND STRENGTH ASSESSMENT  Upper extremity ROM and strength are impaired- LUE in immobilizer  Lower extremity ROM is within functional limits   Lower extremity strength is impaired bilateral LE's: 4/5    BALANCE  Static Sitting: Good  Dynamic Sitting: Fair +  Static Standing: Poor +  Dynamic Standing: Poor       ACTIVITY TOLERANCE  Pulse: 96  Heart Rate Source: Monitor        O2 WALK  Oxygen Therapy  SPO2% Ambulation on Room Air: 99    AM-PAC '6-Clicks' INPATIENT SHORT FORM - BASIC MOBILITY  How much difficulty does the patient currently have...  Patient Difficulty: Turning over in bed (including adjusting bedclothes, sheets and blankets)?: A Little   Patient Difficulty: Sitting down on and standing up from a chair with arms (e.g., wheelchair, bedside commode, etc.): A Little   Patient Difficulty: Moving from lying on back to sitting on the side of the bed?: A Little   How much help from another person does the patient currently need...    Help from Another: Moving to and from a bed to a chair (including a wheelchair)?: A Little   Help from Another: Need to walk in hospital room?: A Little   Help from Another: Climbing 3-5 steps with a railing?: A Lot     AM-PAC Score:  Raw Score: 17   Approx Degree of Impairment: 50.57%   Standardized Score (AM-PAC Scale): 42.13   CMS Modifier (G-Code): CK    FUNCTIONAL ABILITY STATUS  Functional Mobility/Gait Assessment  Gait Assistance: Minimum assistance  Distance (ft): 100ft  Assistive Device: Cane (SBQC)  Pattern: Shuffle (decreased lucinda speed, increased lateral weight shift, decreased step length, guarded posture. Cues for safe management of quad cane). Patient intermittently reaching for wall to request standing rest break.   Sit to Stand: contact guard assist. Cues for appropriate RUE positioning with transitional movements. Instruction on pacing upon standing to allow body time to acclimate to change in position. Min A for static standing balance.     Exercise/Education Provided:  Body mechanics  Energy conservation  Functional activity tolerated  Gait training  Posture  ROM  Strengthening  Transfer training    Skilled Therapy Provided: Patient received sitting on commode. RN approved activity. Coordinated session with OT. Therapist educated pt on POC and physiological benefits of mobilization. Discussed post operative TSA protocol and NWB'ing status LUE and wear schedule of immobilizer (AAT) as communicated by surgeon. Patient with good carryover and expressed understanding. Agreeable to participate. Primary complaint is LUE pain which she rates at 8 out of 10 per pain scale. Therapist educating pt on utilization of LRAD for safe mobility- pt requiring encouragement for appropriate height of DME (prefers DME's adjusted higher 2* c/o impaired proprioception). Trialed both heights for SBQC with improvement in quality of gait with appropriate height adjustment.     The patient's Approx Degree of  Impairment: 50.57% has been calculated based on documentation in the [facility] '6 clicks' Inpatient Basic Mobility Short Form.  Research supports that patients with this level of impairment may benefit from rehab.  Final disposition will be made by interdisciplinary medical team.    Patient End of Session: Up in chair;Needs met;Call light within reach;RN aware of session/findings;Alarm set;Ice applied    CURRENT GOALS  Goals to be met by: 3/3/25  Patient Goal Patient's self-stated goal is: return to PLOF   Goal #1 Patient is able to demonstrate supine - sit EOB @ level: independent     Goal #1   Current Status    Goal #2 Patient is able to demonstrate transfers Sit to/from Stand at assistance level: modified independent with  LRAD     Goal #2  Current Status    Goal #3 Patient is able to ambulate 100 feet with assist device:  LRAD  at assistance level: supervision   Goal #3   Current Status    Goal #4 Patient will negotiate 2 stairs/one curb w/ assistive device and supervision   Goal #4   Current Status    Goal #5 Patient to demonstrate independence with home activity/exercise instructions provided to patient in preparation for discharge.   Goal #5   Current Status      Patient Evaluation Complexity Level:  History Low - no personal factors and/or co-morbidities   Examination of body systems Low -  addressing 1-2 elements   Clinical Presentation Low- Stable   Clinical Decision Making  Low Complexity     Gait Trainin minutes  Therapeutic Activity:  10 minutes

## 2025-02-17 NOTE — PROGRESS NOTES
[facility]  part of [facility]    Progress Note    Clyde Castro Patient Status:  Inpatient    1955 MRN [id number]   Location [facility] 4W/SW/SE Attending Vaishnavi Trammell MD   Hosp Day # 3 PCP WILLIAM HULESCH     Chief complaint: Shoulder fracture  Subjective:   Seen post op  Pains reasonably controlled  Overall doing well, very happy with surgery outcomes  No fevers  Only concerned about bruising she noticed earlier today under both of her breast Rt>L       Objective:   Blood pressure 141/83, pulse 96, temperature 98.6 °F (37 °C), temperature source Oral, resp. rate 18, height 5' 3\" (1.6 m), weight 211 lb (95.7 kg), SpO2 96%.    HEENT: conjunctivae/corneas clear. PERRL, EOM's intact.  Neck: no adenopathy, no carotid bruit, no JVD, supple  Pulmonary:  clear to auscultation bilaterally  Cardiovascular: S1, S2 normal, +loud heart murmur, no gallop, regular rate and rhythm  Abdominal: soft, non-tender; bowel sounds normal; no masses,  no organomegaly  Extremities: no cyanosis or edema, L shoulder in sling  Pulses: palpable and symmetric  Skin: No rashes or lesions, +bruising under both of her breast Rt>L  Neurologic: Alert and oriented X 3,    Psychiatric: calm, cooperative    Assessment and Plan:     Closed fracture of head of left humerus, initial encounter  H/o uneventful L shoulder total arthroplasty on 25  -s/p OR today  with Dr. Piña, FADI reverse total shoulder arthroplasty  -doing well  -pain control  -monitor hgb, stable ~11.3  -Initial leukocytosis improved  -bowel protocol  -DVT proph with heparin subcutaneous  -held naproxen and ASA prior surgery, restart as per ortho  -cont proph doxy (from recent surgery)  -s/p dose of fluconazole per patient request (to prevent a yeast infection)    Bruising  under both of her breast Rt>L  Suspect either result of initial fall at home or possibly from tight sling/shoulder immobilizer prior surgery  -monitor size, and  hgb     BRYANNA, per protocol  HTN, wnl, at home actually on lisinopril 10 mg, now held as BP on lower side, monitor    HL, on statins  Reflux      H/o severe aortic stenosis, asymptomatic  Follows with Dr. Marino at [facility]  Previous stress test ok  -avoid hypotension  -otherwise, pt tolerated previous surgery well, ok to proceed  -tele perioperatively   -Hold lisinopril prior surgery, BP low normal     Dispo: pending progress, will need to reeval post surgery with PT/OT, however suspect she would benefit from rehab.               Supplementary Documentation:   DVT Mechanical Prophylaxis:   SCDs, Early ambuation  DVT Pharmacologic Prophylaxis   Medication    [Held by provider] heparin (Porcine) 5000 UNIT/ML injection 5,000 Units         DVT Pharmacologic prophylaxis: Aspirin 81 mg      Code Status: Full Code  Calle: No urinary catheter in place  Calle Duration (in days):   Central line:    DOUG: 2/17/2025                        Chart reviewed, including current vitals, notes, labs and imaging  Pertinent past medical records reviewed  Labs ordered and medications adjusted as outlined above  Home medications reconciliation completed  Coordinate care with care team/consultants  Discussed with patient  at bedside results of tests, management plan as outlined above, and the need for ongoing hospitalization  D/w RN     MDM high      Results:     Lab Results   Component Value Date    WBC 10.3 02/14/2025    HGB 11.3 (L) 02/14/2025    HCT 34.0 (L) 02/14/2025    .0 02/14/2025    CREATSERUM 0.73 02/13/2025    BUN 23 02/13/2025     (L) 02/13/2025    K 4.0 02/13/2025     02/13/2025    CO2 26.0 02/13/2025    GLU 94 02/13/2025    CA 8.7 02/13/2025    ALB 4.0 02/13/2025    ALKPHO 61 02/13/2025    BILT 0.4 02/13/2025    TP 6.3 02/13/2025    AST 17 02/13/2025    ALT 15 02/13/2025    MG 1.8 02/13/2025       XR SHOULDER, COMPLETE (MIN 2 VIEWS), LEFT (CPT=73030)    Result Date: 2/16/2025  CONCLUSION:    Postsurgical changes of left reverse shoulder arthroplasty with radiographically uncomplicated hardware.    Dictated by (CST): Lai Boland MD on 2/16/2025 at 12:35 PM     Finalized by (CST): Lai Boland MD on 2/16/2025 at 12:36 PM                 KERRI HUFFMAN MD  2/16/2025

## 2025-02-17 NOTE — PLAN OF CARE
Clyde is A&O x4. CPAP at night. Remote tele. Voiding freely to the bathroom. Ambulates with stand by assistance. Received medication for pain. Received IV antibiotic.  Plan on to work with OT today. Call light and belongings in reach. Bed locked and in lowest position, bed alarm on. Frequent rounding by nursing staff.     Problem: Patient Centered Care  Goal: Patient preferences are identified and integrated in the patient's plan of care  Description: Interventions:  - What would you like us to know as we care for you?   - Provide timely, complete, and accurate information to patient/family  - Incorporate patient and family knowledge, values, beliefs, and cultural backgrounds into the planning and delivery of care  - Encourage patient/family to participate in care and decision-making at the level they choose  - Honor patient and family perspectives and choices  Outcome: Progressing     Problem: Patient/Family Goals  Goal: Patient/Family Long Term Goal  Description: Patient's Long Term Goal: go home    Interventions:  - surgery of left shoulder  - manage pain  - monitor vitals and labs  - maximize mobility    - See additional Care Plan goals for specific interventions  Outcome: Progressing  Goal: Patient/Family Short Term Goal  Description: Patient's Short Term Goal: manage pain    Interventions:   - PRN pain medication  - sling  - Ice packs    - See additional Care Plan goals for specific interventions  Outcome: Progressing     Problem: PAIN - ADULT  Goal: Verbalizes/displays adequate comfort level or patient's stated pain goal  Description: INTERVENTIONS:  - Encourage pt to monitor pain and request assistance  - Assess pain using appropriate pain scale  - Administer analgesics based on type and severity of pain and evaluate response  - Implement non-pharmacological measures as appropriate and evaluate response  - Consider cultural and social influences on pain and pain management  - Manage/alleviate anxiety  -  Utilize distraction and/or relaxation techniques  - Monitor for opioid side effects  - Notify MD/LIP if interventions unsuccessful or patient reports new pain  - Anticipate increased pain with activity and pre-medicate as appropriate  Outcome: Progressing     Problem: RISK FOR INFECTION - ADULT  Goal: Absence of fever/infection during anticipated neutropenic period  Description: INTERVENTIONS  - Monitor WBC  - Administer growth factors as ordered  - Implement neutropenic guidelines  Outcome: Progressing     Problem: SAFETY ADULT - FALL  Goal: Free from fall injury  Description: INTERVENTIONS:  - Assess pt frequently for physical needs  - Identify cognitive and physical deficits and behaviors that affect risk of falls.  - [place] fall precautions as indicated by assessment.  - Educate pt/family on patient safety including physical limitations  - Instruct pt to call for assistance with activity based on assessment  - Modify environment to reduce risk of injury  - Provide assistive devices as appropriate  - Consider OT/PT consult to assist with strengthening/mobility  - Encourage toileting schedule  Outcome: Progressing     Problem: DISCHARGE PLANNING  Goal: Discharge to home or other facility with appropriate resources  Description: INTERVENTIONS:  - Identify barriers to discharge w/pt and caregiver  - Include patient/family/discharge partner in discharge planning  - Arrange for needed discharge resources and transportation as appropriate  - Identify discharge learning needs (meds, wound care, etc)  - Arrange for interpreters to assist at discharge as needed  - Consider post-discharge preferences of patient/family/discharge partner  - Complete POLST form as appropriate  - Assess patient's ability to be responsible for managing their own health  - Refer to Case Management Department for coordinating discharge planning if the patient needs post-hospital services based on physician/LIP order or complex needs related to  functional status, cognitive ability or social support system  Outcome: Progressing     Problem: SKIN/TISSUE INTEGRITY - ADULT  Goal: Incision(s), wounds(s) or drain site(s) healing without S/S of infection  Description: INTERVENTIONS:  - Assess and document risk factors for pressure ulcer development  - Assess and document skin integrity  - Assess and document dressing/incision, wound bed, drain sites and surrounding tissue  - Implement wound care per orders  - Initiate isolation precautions as appropriate  - Initiate Pressure Ulcer prevention bundle as indicated  Outcome: Progressing

## 2025-02-17 NOTE — OCCUPATIONAL THERAPY NOTE
OCCUPATIONAL THERAPY EVALUATION - INPATIENT     Room Number: 403/403-A  Evaluation Date: 2/17/2025  Type of Evaluation: Initial  Presenting Problem: fall, closed fx of head of L humerus s/p L reverse TSA by Dr. Piña 2/16  Hx recent L shoulder replacement 2/11, cord injury per pt, R knee buckling at baseline     Physician Order: IP Consult to Occupational Therapy  Reason for Therapy: ADL/IADL Dysfunction and Discharge Planning    OCCUPATIONAL THERAPY ASSESSMENT   Patient is a 69 year old female admitted 2/13/2025 for fall, closed fx of head of L humerus s/p L reverse TSA by Dr. Piña 2/16. Prior to admission, patient's baseline is independent with ADLs and driving and MI for fx mobility using walking stick. Patient is currently functioning below baseline with ADLs and fx mobility/transfers. Patient is requiring up to mod assist for ADLs as a result of the following impairments: decreased functional strength, decreased functional reach, decreased endurance, pain, impaired balance, decreased muscular endurance, and ability to perform bimanual tasks. Occupational Therapy will continue to follow for duration of hospitalization.    Patient will benefit from continued skilled OT Services to facilitate return to prior level of function as patient demonstrates high motivation with excellent tolerance to an intensive therapy program.    PLAN DURING HOSPITALIZATION  OT Device Recommendations: TBD  OT Treatment Plan: Balance activities;Energy conservation/work simplification techniques;ADL training;Functional transfer training;Endurance training;Patient/Family education;Patient/Family training;Equipment eval/education;Compensatory technique education     OCCUPATIONAL THERAPY MEDICAL/SOCIAL HISTORY   Problem List   Principal Problem:    Closed fracture of head of left humerus, initial encounter  Active Problems:    Status post total replacement of left shoulder    Anemia, unspecified type    HOME SITUATION  Type of  Home: House  Home Layout: One level  Lives With: Alone (granddaughter to assist for 3 days after discharge; supportive boyfriend)  Toilet and Equipment: Standard height toilet (toilet safety frame)  Shower/Tub and Equipment: Walk-in shower; Grab bar (stool)  Drives: Yes  Patient Regularly Uses: -- (owns p/u walker, rollator; walking stick)  Stairs in Home: 2 CHARLEE    SUBJECTIVE  Patient agreeable to OT evaluation.    OCCUPATIONAL THERAPY EXAMINATION   OBJECTIVE  Precautions: Limb alert - left (sling AAT)  Fall Risk: High fall risk    WEIGHT BEARING RESTRICTION  L Upper Extremity: Non-Weight Bearing    PAIN ASSESSMENT  Ratin  Location: L shoulder  Management Techniques: Activity promotion; Body mechanics; Repositioning    ACTIVITY TOLERANCE  Pulse: 95  Heart Rate Source: Monitor           O2 SATURATIONS  Oxygen Therapy  SPO2% Ambulation on Room Air: 98    COGNITION  Overall Cognitive Status:  WFL - within functional limits    RANGE OF MOTION   Upper extremity ROM is within functional limits except L UE immobilized in sling    STRENGTH ASSESSMENT  Upper extremity strength is within functional limits except L UE NWB    ACTIVITIES OF DAILY LIVING ASSESSMENT  AM-PAC ‘6-Clicks’ Inpatient Daily Activity Short Form  How much help from another person does the patient currently need…  -   Putting on and taking off regular lower body clothing?: A Little  -   Bathing (including washing, rinsing, drying)?: A Lot  -   Toileting, which includes using toilet, bedpan or urinal? : A Little  -   Putting on and taking off regular upper body clothing?: A Lot  -   Taking care of personal grooming such as brushing teeth?: A Little  -   Eating meals?: None    AM-PAC Score:  Score: 17  Approx Degree of Impairment: 50.11%  Standardized Score (AM-PAC Scale): 37.26  CMS Modifier (G-Code): CK    BED MOBILITY  Not Tested - patient in chair upon therapist arrival    FUNCTIONAL TRANSFER ASSESSMENT  Sit to Stand from EOB: contact guard  assist  Toilet Transfer: contact guard assist  Chair Transfer: contact guard assist    FUNCTIONAL MOBILITY  contact guard assist to min assist for in-room fx mobility using SBQC for R UE support  Comments:   Patient reported she has balance deficits at baseline.     ACTIVITIES OF DAILY LIVING  Eating: setup assist (per obs)   Grooming: contact guard assist standing at sink  UB Dressing: mod assist (per obs)   LB Dressing: contact guard assist for standing balance during LB clothing mgmt  Toileting: supervision seated    EDUCATION PROVIDED  Patient Education : Role of Occupational Therapy; Plan of Care; Discharge Recommendations; Functional Transfer Techniques; Fall Prevention; Posture/Positioning; Energy Conservation; Proper Body Mechanics; Weight Bear Status; Bracing Education Provided  Patient's Response to Education: Verbalized Understanding; Returned Demonstration; Requires Further Education    The patient's Approx Degree of Impairment: 50.11% has been calculated based on documentation in the [facility] '6 clicks' Inpatient Daily Activity Short Form.  Research supports that patients with this level of impairment may benefit from rehab.  Final disposition will be made by interdisciplinary medical team.    Patient End of Session: Up in chair;Needs met;RN aware of session/findings;Call light within reach;All patient questions and concerns addressed;Hospital anti-slip socks;Ice applied    OT Goals  Patient self-stated goal is: none stated     Patient will complete UE dressing with SUP  Comment:     Patient will complete toilet transfer with SUP  Comment:     Patient will complete self care task at sink level with SUP   Comment:    Patient will tolerate household distance fx mobility using appropriate device with SUP  Comment:         Goals  on: 3/3/25  Frequency: 3-5x/week    Patient Evaluation Complexity Level:   Occupational Profile/Medical History MODERATE - Expanded review of history including review of medical  or therapy record   Specific performance deficits impacting engagement in ADL/IADL MODERATE  3 - 5 performance deficits   Client Assessment/Performance Deficits MODERATE - Comorbidities and min to mod modifications of tasks    Clinical Decision Making MODERATE - Analysis of occupational profile, detailed assessments, several treatment options    Overall Complexity MODERATE     OT Session Time  Self-Care Home Management: 15 minutes  Therapeutic Activity: 16 minutes    SELMA Maria/L  [facility]  #[id number]

## 2025-02-17 NOTE — PROGRESS NOTES
[facility]  part of [facility]    Progress Note    Clyde Castro Patient Status:  Inpatient    1955 MRN [id number]   Location [facility] 4W/SW/SE Attending Vaishnavi Trammell MD   Hosp Day # 4 PCP WILLIAM HULESCH     Chief complaint: Shoulder fracture  Subjective:     Pains reasonably controlled, but recovery slow  No fevers  bruising  under both of her breast Rt>L about the same  No GI issues       Objective:   Blood pressure 124/74, pulse 95, temperature 98.1 °F (36.7 °C), temperature source Oral, resp. rate 16, height 5' 3\" (1.6 m), weight 211 lb (95.7 kg), SpO2 96%.    HEENT: conjunctivae/corneas clear. PERRL, EOM's intact.  Neck: no adenopathy, no carotid bruit, no JVD, supple  Pulmonary:  clear to auscultation bilaterally  Cardiovascular: S1, S2 normal, +loud heart murmur, no gallop, regular rate and rhythm  Abdominal: soft, non-tender; bowel sounds normal; no masses,  no organomegaly  Extremities: no cyanosis or edema, L shoulder in sling  Pulses: palpable and symmetric  Skin: No rashes or lesions, +bruising under both of her breast Rt>L  Neurologic: Alert and oriented X 3,    Psychiatric: calm, cooperative    Assessment and Plan:     Closed fracture of head of left humerus, initial encounter  H/o uneventful L shoulder total arthroplasty on 25  -s/p OR today  with Dr. Piña, FADI reverse total shoulder arthroplasty  -doing well  -pain control  -monitor hgb, stable ~11.3-->10.7 post op  -Initial leukocytosis improved  -bowel protocol  -DVT proph with heparin subcutaneous  -held naproxen and ASA prior surgery, restarted post op as per ortho  -cont proph doxy (from recent surgery)  -s/p dose of fluconazole per patient request (to prevent a yeast infection)    Bruising  under both of her breast Rt>L  Suspect either result of initial fall at home or possibly from tight sling/shoulder immobilizer prior surgery  -monitor size, and hgb     BRYANNA, per protocol  HTN, wnl,  at home actually on lisinopril 10 mg, now held as BP on lower side, monitor    HL, on statins  Reflux      H/o severe aortic stenosis, asymptomatic  Follows with Dr. Marino at [facility]  Previous stress test ok  -avoid hypotension  -otherwise, pt tolerated previous surgery well, ok to proceed  -tele perioperatively   -Hold lisinopril prior surgery, BP low normal     Dispo: pending progress, per PT/OT would benefit from acute rehab.               Supplementary Documentation:   DVT Mechanical Prophylaxis:   SCDs, Early ambuation  DVT Pharmacologic Prophylaxis   Medication    [Held by provider] heparin (Porcine) 5000 UNIT/ML injection 5,000 Units         DVT Pharmacologic prophylaxis: Aspirin 81 mg      Code Status: Full Code  Calle: No urinary catheter in place  Calle Duration (in days):   Central line:    DOUG: 2/18/2025                        Chart reviewed, including current vitals, notes, labs and imaging  Pertinent past medical records reviewed  Labs ordered and medications adjusted as outlined above  Home medications reconciliation completed  Coordinate care with care team/consultants  Discussed with patient  at bedside results of tests, management plan as outlined above, and the need for ongoing hospitalization  D/w RN     MDM high      Results:     Lab Results   Component Value Date    WBC 10.8 02/17/2025    HGB 10.7 (L) 02/17/2025    HCT 32.3 (L) 02/17/2025    .0 02/17/2025    CREATSERUM 0.75 02/17/2025    BUN 17 02/17/2025     (L) 02/17/2025    K 4.2 02/17/2025     02/17/2025    CO2 23.0 02/17/2025     (H) 02/17/2025    CA 9.0 02/17/2025    ALB 4.3 02/17/2025    ALKPHO 61 02/13/2025    BILT 0.4 02/13/2025    TP 6.3 02/13/2025    AST 17 02/13/2025    ALT 15 02/13/2025    MG 2.0 02/17/2025    PHOS 3.6 02/17/2025       XR SHOULDER, COMPLETE (MIN 2 VIEWS), LEFT (CPT=73030)    Result Date: 2/16/2025  CONCLUSION:   Postsurgical changes of left reverse shoulder arthroplasty  with radiographically uncomplicated hardware.    Dictated by (CST): Lai Boland MD on 2/16/2025 at 12:35 PM     Finalized by (CST): Lai Boland MD on 2/16/2025 at 12:36 PM                 KERRI HUFFMAN MD  2/17/2025

## 2025-02-18 PROCEDURE — 99232 SBSQ HOSP IP/OBS MODERATE 35: CPT | Performed by: HOSPITALIST

## 2025-02-18 NOTE — PLAN OF CARE
Clyde is alert and ortiented x4. On room air, CPAP at night.  Remote tele. Voiding freely to the bathroom. Ambulates with stand by assistance. Received pain medication. Plan on working with PT/OT today, And plan to discharge to [facility] pending availability and clearance. Call light and belongings in reach. Bed locked and in lowest position, bed alarm on. Frequent rounding by nursing staff.     Problem: Patient Centered Care  Goal: Patient preferences are identified and integrated in the patient's plan of care  Description: Interventions:  - What would you like us to know as we care for you?   - Provide timely, complete, and accurate information to patient/family  - Incorporate patient and family knowledge, values, beliefs, and cultural backgrounds into the planning and delivery of care  - Encourage patient/family to participate in care and decision-making at the level they choose  - Honor patient and family perspectives and choices  Outcome: Progressing     Problem: Patient/Family Goals  Goal: Patient/Family Long Term Goal  Description: Patient's Long Term Goal: go home    Interventions:  - surgery of left shoulder  - manage pain  - monitor vitals and labs  - maximize mobility    - See additional Care Plan goals for specific interventions  Outcome: Progressing  Goal: Patient/Family Short Term Goal  Description: Patient's Short Term Goal: manage pain    Interventions:   - PRN pain medication  - sling  - Ice packs    - See additional Care Plan goals for specific interventions  Outcome: Progressing     Problem: PAIN - ADULT  Goal: Verbalizes/displays adequate comfort level or patient's stated pain goal  Description: INTERVENTIONS:  - Encourage pt to monitor pain and request assistance  - Assess pain using appropriate pain scale  - Administer analgesics based on type and severity of pain and evaluate response  - Implement non-pharmacological measures as appropriate and evaluate response  - Consider cultural and social  influences on pain and pain management  - Manage/alleviate anxiety  - Utilize distraction and/or relaxation techniques  - Monitor for opioid side effects  - Notify MD/LIP if interventions unsuccessful or patient reports new pain  - Anticipate increased pain with activity and pre-medicate as appropriate  Outcome: Progressing     Problem: RISK FOR INFECTION - ADULT  Goal: Absence of fever/infection during anticipated neutropenic period  Description: INTERVENTIONS  - Monitor WBC  - Administer growth factors as ordered  - Implement neutropenic guidelines  Outcome: Progressing     Problem: SAFETY ADULT - FALL  Goal: Free from fall injury  Description: INTERVENTIONS:  - Assess pt frequently for physical needs  - Identify cognitive and physical deficits and behaviors that affect risk of falls.  - [place] fall precautions as indicated by assessment.  - Educate pt/family on patient safety including physical limitations  - Instruct pt to call for assistance with activity based on assessment  - Modify environment to reduce risk of injury  - Provide assistive devices as appropriate  - Consider OT/PT consult to assist with strengthening/mobility  - Encourage toileting schedule  Outcome: Progressing     Problem: DISCHARGE PLANNING  Goal: Discharge to home or other facility with appropriate resources  Description: INTERVENTIONS:  - Identify barriers to discharge w/pt and caregiver  - Include patient/family/discharge partner in discharge planning  - Arrange for needed discharge resources and transportation as appropriate  - Identify discharge learning needs (meds, wound care, etc)  - Arrange for interpreters to assist at discharge as needed  - Consider post-discharge preferences of patient/family/discharge partner  - Complete POLST form as appropriate  - Assess patient's ability to be responsible for managing their own health  - Refer to Case Management Department for coordinating discharge planning if the patient needs  post-hospital services based on physician/LIP order or complex needs related to functional status, cognitive ability or social support system  Outcome: Progressing     Problem: SKIN/TISSUE INTEGRITY - ADULT  Goal: Incision(s), wounds(s) or drain site(s) healing without S/S of infection  Description: INTERVENTIONS:  - Assess and document risk factors for pressure ulcer development  - Assess and document skin integrity  - Assess and document dressing/incision, wound bed, drain sites and surrounding tissue  - Implement wound care per orders  - Initiate isolation precautions as appropriate  - Initiate Pressure Ulcer prevention bundle as indicated  Outcome: Progressing

## 2025-02-18 NOTE — PHYSICAL THERAPY NOTE
PHYSICAL THERAPY TREATMENT NOTE - INPATIENT     Room Number: 403/403-A       Presenting Problem: L shoulder fracture requiring L reverse TSA on 2/16/25  Co-Morbidities : Hx recent L shoulder replacement 2/11, cord injury per pt, R knee buckling at baseline    Problem List  Principal Problem:    Closed fracture of head of left humerus, initial encounter  Active Problems:    Status post total replacement of left shoulder    Anemia, unspecified type      PHYSICAL THERAPY ASSESSMENT   Patient demonstrates good  progress this session, goals  remain in progress.      Patient is requiring contact guard assist and minimal assist as a result of the following impairments: decreased functional strength, pain, impaired dynamic standing balance, decreased muscular endurance, and limited LUE ROM.     Patient continues to function below baseline with bed mobility, transfers, gait, stair negotiation, standing prolonged periods, and performing household tasks.  Next session anticipate patient to progress bed mobility, transfers, gait, and stair negotiation.  Physical Therapy will continue to follow patient for duration of hospitalization.    Patient continues to benefit from continued skilled PT services: to facilitate return to prior level of function as patient demonstrates high motivation with excellent tolerance to an intensive therapy program .    PLAN DURING HOSPITALIZATION  Nursing Mobility Recommendation : 1 Assist  PT Device Recommendation: SBQC;Gait belt  PT Treatment Plan: Bed mobility;Body mechanics;Endurance;Energy conservation;Patient education;Family education;Gait training;Range of motion;Strengthening;Stoop training;Stair training;Transfer training;Balance training  Frequency (Obs): Daily     SUBJECTIVE  I feel more jeremy with this kvng walker than the cane    OBJECTIVE  Precautions: Limb alert - left (sling AAT)    WEIGHT BEARING RESTRICTION  L Upper Extremity: Non-Weight Bearing      PAIN ASSESSMENT   Rating:   (did not quantify per pain scale)  Location: LUE managed  Management Techniques: Activity promotion;Body mechanics;Repositioning    BALANCE  Static Sitting: Normal  Dynamic Sitting: Fair +  Static Standing: Fair -  Dynamic Standing: Fair -        AM-PAC '6-Clicks' INPATIENT SHORT FORM - BASIC MOBILITY  How much difficulty does the patient currently have...  Patient Difficulty: Turning over in bed (including adjusting bedclothes, sheets and blankets)?: A Little   Patient Difficulty: Sitting down on and standing up from a chair with arms (e.g., wheelchair, bedside commode, etc.): A Little   Patient Difficulty: Moving from lying on back to sitting on the side of the bed?: A Little   How much help from another person does the patient currently need...   Help from Another: Moving to and from a bed to a chair (including a wheelchair)?: A Little   Help from Another: Need to walk in hospital room?: A Little   Help from Another: Climbing 3-5 steps with a railing?: A Lot     AM-PAC Score:  Raw Score: 17   Approx Degree of Impairment: 50.57%   Standardized Score (AM-PAC Scale): 42.13   CMS Modifier (G-Code): CK    FUNCTIONAL ABILITY STATUS  Functional Mobility/Gait Assessment  Gait Assistance: Minimum assistance (progressing to CGA)  Distance (ft): 40 ft + 50 ft + 90 ft  Assistive Device:  (kvng walker)  Pattern: Shuffle (very slow pace, decreased step length)  Sit to Stand: contact guard assist to kvng walker    Skilled Therapy Provided: Pt rec'd in chair agreeable to therapy, identified by name and , gait belt donned for mobility. Pt with LUE sling donned and snug, reviewed NWB status, able to maintain during mobility. Pt trialed kvng walker this session, with cues required for gait sequencing. Pt able to tolerate increased gait distance with kvng walker vs SBQC, demos smoother gait mechanics with cues and seated rest breaks 2/2 fatigue and L shoulder pain. Pt up in chair at end of session with needs in reach.      The  patient's Approx Degree of Impairment: 50.57% has been calculated based on documentation in the [facility] '6 clicks' Inpatient Daily Activity Short Form.  Research supports that patients with this level of impairment may benefit from rehab.  Final disposition will be made by interdisciplinary medical team.        Patient End of Session: Up in chair;Needs met;Call light within reach;RN aware of session/findings;Ice applied    CURRENT GOALS   Goals to be met by: 3/3/25  Patient Goal Patient's self-stated goal is: return to PLOF   Goal #1 Patient is able to demonstrate supine - sit EOB @ level: independent      Goal #1   Current Status  in progress   Goal #2 Patient is able to demonstrate transfers Sit to/from Stand at assistance level: modified independent with  LRAD      Goal #2  Current Status  in progress   Goal #3 Patient is able to ambulate 100 feet with assist device:  LRAD  at assistance level: supervision   Goal #3   Current Status  in progress   Goal #4 Patient will negotiate 2 stairs/one curb w/ assistive device and supervision   Goal #4   Current Status  in progress   Goal #5 Patient to demonstrate independence with home activity/exercise instructions provided to patient in preparation for discharge.   Goal #5   Current Status  in progress     Gait Trainin minutes

## 2025-02-18 NOTE — PLAN OF CARE
Alert and oriented x4. Post-op day #2. Dressing in place to left shoulder/immobilizer. Monitoring vital signs- stable at this time. No acute changes noted throughout shift. SCDs and ASA for DVT prophylaxis. Pain medication provided as needed. Up with standby assist. Encouraged frequent ambulation and use of incentive spirometer. Fall precautions maintained- bed locked in lowest position, call light and personal belongings within reach, non-skid socks in place to bilateral feet. Frequent rounding by nursing staff. Plan for acute rehab -  VS home.     Problem: Patient Centered Care  Goal: Patient preferences are identified and integrated in the patient's plan of care  Description: Interventions:  - What would you like us to know as we care for you?   - Provide timely, complete, and accurate information to patient/family  - Incorporate patient and family knowledge, values, beliefs, and cultural backgrounds into the planning and delivery of care  - Encourage patient/family to participate in care and decision-making at the level they choose  - Honor patient and family perspectives and choices  Outcome: Progressing     Problem: Patient/Family Goals  Goal: Patient/Family Long Term Goal  Description: Patient's Long Term Goal: go home    Interventions:  - surgery of left shoulder  - manage pain  - monitor vitals and labs  - maximize mobility    - See additional Care Plan goals for specific interventions  Outcome: Progressing  Goal: Patient/Family Short Term Goal  Description: Patient's Short Term Goal: manage pain    Interventions:   - PRN pain medication  - sling  - Ice packs    - See additional Care Plan goals for specific interventions  Outcome: Progressing     Problem: PAIN - ADULT  Goal: Verbalizes/displays adequate comfort level or patient's stated pain goal  Description: INTERVENTIONS:  - Encourage pt to monitor pain and request assistance  - Assess pain using appropriate pain scale  - Administer analgesics based on  type and severity of pain and evaluate response  - Implement non-pharmacological measures as appropriate and evaluate response  - Consider cultural and social influences on pain and pain management  - Manage/alleviate anxiety  - Utilize distraction and/or relaxation techniques  - Monitor for opioid side effects  - Notify MD/LIP if interventions unsuccessful or patient reports new pain  - Anticipate increased pain with activity and pre-medicate as appropriate  Outcome: Progressing     Problem: RISK FOR INFECTION - ADULT  Goal: Absence of fever/infection during anticipated neutropenic period  Description: INTERVENTIONS  - Monitor WBC  - Administer growth factors as ordered  - Implement neutropenic guidelines  Outcome: Progressing     Problem: SAFETY ADULT - FALL  Goal: Free from fall injury  Description: INTERVENTIONS:  - Assess pt frequently for physical needs  - Identify cognitive and physical deficits and behaviors that affect risk of falls.  - [place] fall precautions as indicated by assessment.  - Educate pt/family on patient safety including physical limitations  - Instruct pt to call for assistance with activity based on assessment  - Modify environment to reduce risk of injury  - Provide assistive devices as appropriate  - Consider OT/PT consult to assist with strengthening/mobility  - Encourage toileting schedule  Outcome: Progressing     Problem: DISCHARGE PLANNING  Goal: Discharge to home or other facility with appropriate resources  Description: INTERVENTIONS:  - Identify barriers to discharge w/pt and caregiver  - Include patient/family/discharge partner in discharge planning  - Arrange for needed discharge resources and transportation as appropriate  - Identify discharge learning needs (meds, wound care, etc)  - Arrange for interpreters to assist at discharge as needed  - Consider post-discharge preferences of patient/family/discharge partner  - Complete POLST form as appropriate  - Assess patient's  ability to be responsible for managing their own health  - Refer to Case Management Department for coordinating discharge planning if the patient needs post-hospital services based on physician/LIP order or complex needs related to functional status, cognitive ability or social support system  Outcome: Progressing     Problem: SKIN/TISSUE INTEGRITY - ADULT  Goal: Incision(s), wounds(s) or drain site(s) healing without S/S of infection  Description: INTERVENTIONS:  - Assess and document risk factors for pressure ulcer development  - Assess and document skin integrity  - Assess and document dressing/incision, wound bed, drain sites and surrounding tissue  - Implement wound care per orders  - Initiate isolation precautions as appropriate  - Initiate Pressure Ulcer prevention bundle as indicated  Outcome: Progressing

## 2025-02-19 VITALS
DIASTOLIC BLOOD PRESSURE: 71 MMHG | RESPIRATION RATE: 18 BRPM | BODY MASS INDEX: 37.39 KG/M2 | WEIGHT: 211 LBS | OXYGEN SATURATION: 95 % | HEIGHT: 63 IN | HEART RATE: 67 BPM | SYSTOLIC BLOOD PRESSURE: 117 MMHG | TEMPERATURE: 98 F

## 2025-02-19 LAB
DEPRECATED RDW RBC AUTO: 41.6 FL (ref 35.1–46.3)
ERYTHROCYTE [DISTWIDTH] IN BLOOD BY AUTOMATED COUNT: 13 % (ref 11–15)
HCT VFR BLD AUTO: 29.3 %
HGB BLD-MCNC: 9.8 G/DL
MCH RBC QN AUTO: 29.6 PG (ref 26–34)
MCHC RBC AUTO-ENTMCNC: 33.4 G/DL (ref 31–37)
MCV RBC AUTO: 88.5 FL
PLATELET # BLD AUTO: 287 10(3)UL (ref 150–450)
RBC # BLD AUTO: 3.31 X10(6)UL
WBC # BLD AUTO: 9.4 X10(3) UL (ref 4–11)

## 2025-02-19 PROCEDURE — 99239 HOSP IP/OBS DSCHRG MGMT >30: CPT | Performed by: HOSPITALIST

## 2025-02-19 NOTE — PLAN OF CARE
Patient cleared for discharge. After visit summary reviewed with patient. Personal belongings sent with patient, gel ice packs provided, transported via car.  1458: Call attempt to Vero Rodriguez to give report. Nurse not available at this time, given phone number to call when available  1511: Call returned by nurse Violet and report given.    Problem: Patient Centered Care  Goal: Patient preferences are identified and integrated in the patient's plan of care  Description: Interventions:  - What would you like us to know as we care for you?   - Provide timely, complete, and accurate information to patient/family  - Incorporate patient and family knowledge, values, beliefs, and cultural backgrounds into the planning and delivery of care  - Encourage patient/family to participate in care and decision-making at the level they choose  - Honor patient and family perspectives and choices  Outcome: Adequate for Discharge     Problem: Patient/Family Goals  Goal: Patient/Family Long Term Goal  Description: Patient's Long Term Goal: go home    Interventions:  - surgery of left shoulder  - manage pain  - monitor vitals and labs  - maximize mobility    - See additional Care Plan goals for specific interventions  Outcome: Adequate for Discharge  Goal: Patient/Family Short Term Goal  Description: Patient's Short Term Goal: manage pain    Interventions:   - PRN pain medication  - sling  - Ice packs    - See additional Care Plan goals for specific interventions  Outcome: Adequate for Discharge     Problem: PAIN - ADULT  Goal: Verbalizes/displays adequate comfort level or patient's stated pain goal  Description: INTERVENTIONS:  - Encourage pt to monitor pain and request assistance  - Assess pain using appropriate pain scale  - Administer analgesics based on type and severity of pain and evaluate response  - Implement non-pharmacological measures as appropriate and evaluate response  - Consider cultural and social influences on pain and  pain management  - Manage/alleviate anxiety  - Utilize distraction and/or relaxation techniques  - Monitor for opioid side effects  - Notify MD/LIP if interventions unsuccessful or patient reports new pain  - Anticipate increased pain with activity and pre-medicate as appropriate  Outcome: Adequate for Discharge     Problem: RISK FOR INFECTION - ADULT  Goal: Absence of fever/infection during anticipated neutropenic period  Description: INTERVENTIONS  - Monitor WBC  - Administer growth factors as ordered  - Implement neutropenic guidelines  Outcome: Adequate for Discharge     Problem: SAFETY ADULT - FALL  Goal: Free from fall injury  Description: INTERVENTIONS:  - Assess pt frequently for physical needs  - Identify cognitive and physical deficits and behaviors that affect risk of falls.  - [place] fall precautions as indicated by assessment.  - Educate pt/family on patient safety including physical limitations  - Instruct pt to call for assistance with activity based on assessment  - Modify environment to reduce risk of injury  - Provide assistive devices as appropriate  - Consider OT/PT consult to assist with strengthening/mobility  - Encourage toileting schedule  Outcome: Adequate for Discharge     Problem: DISCHARGE PLANNING  Goal: Discharge to home or other facility with appropriate resources  Description: INTERVENTIONS:  - Identify barriers to discharge w/pt and caregiver  - Include patient/family/discharge partner in discharge planning  - Arrange for needed discharge resources and transportation as appropriate  - Identify discharge learning needs (meds, wound care, etc)  - Arrange for interpreters to assist at discharge as needed  - Consider post-discharge preferences of patient/family/discharge partner  - Complete POLST form as appropriate  - Assess patient's ability to be responsible for managing their own health  - Refer to Case Management Department for coordinating discharge planning if the patient needs  post-hospital services based on physician/LIP order or complex needs related to functional status, cognitive ability or social support system  Outcome: Adequate for Discharge     Problem: SKIN/TISSUE INTEGRITY - ADULT  Goal: Incision(s), wounds(s) or drain site(s) healing without S/S of infection  Description: INTERVENTIONS:  - Assess and document risk factors for pressure ulcer development  - Assess and document skin integrity  - Assess and document dressing/incision, wound bed, drain sites and surrounding tissue  - Implement wound care per orders  - Initiate isolation precautions as appropriate  - Initiate Pressure Ulcer prevention bundle as indicated  Outcome: Adequate for Discharge

## 2025-02-19 NOTE — DISCHARGE INSTRUCTIONS
Non-weight Bearing LUE  Sling at all times  ASA DVT ppx  Dressing changes prn  Follow up 2 weeks  Pt's ok to shower with an Aquacel dressing intact.

## 2025-02-19 NOTE — DISCHARGE SUMMARY
[facility]  part of [facility]     DISCHARGE SUMMARY     Clyde Castro Patient Status:  Inpatient    1955 MRN [id number]   Location [facility] 4W/SW/SE Attending No att. providers found   Hosp Day # 6 PCP WILLIAM HULESCH     DATE OF ADMISSION: 2025  DATE OF DISCHARGE: 2025   DISPOSITION: acute rehab  CONDITION ON DISCHARGE: good    DISCHARGE DIAGNOSES:    Closed fracture of head of left humerus, initial encounter    Status post total replacement of left shoulder    Anemia, unspecified type    Procedure Performed:  LEFT REVERSE TOTAL SHOULDER    HISTORY OF PRESENT ILLNESS (COPIED FROM ADMISSION H&P)  Clyde Castro is a(n) 69 year old female just discharged yesterday after uneventful left total shoulder arthroplasty with Dr. Piña, came back after mechanical fall at home on her left side. Found to have a periprosthetic fracture.  C/o severe pains, otherwise comfortable, no tingling or numbness in hand.  No dizziness, no CP, no dyspnea.     HOSPITAL COURSE:  Patient was admitted, underwent surgery as described above.  Did well postoperatively.  Cleared by orthopedics for discharge to acute rehab.    Patient understands return to the emergency room for increased pain, fever, discharge, shortness of breath, chest pain, new neurologic symptoms, other concerning symptoms.    PHYSICAL EXAM:  Temp:  [97.9 °F (36.6 °C)-98.9 °F (37.2 °C)] 97.9 °F (36.6 °C)  Pulse:  [67-82] 67  Resp:  [18] 18  BP: (117-144)/(71-84) 117/71  SpO2:  [95 %-98 %] 95 %  Gen: A+Ox3.  No distress.   HEENT: NCAT, neck supple, no carotid bruit.  CV: RRR, S1S2, and intact distal pulses. No gallop, rub, murmur.  Pulm: Effort and breath sounds normal. No distress, wheezes, rales, rhonchi.  Abd: Soft, NTND, BS normal, no mass, no HSM, no rebound/guarding.   Neuro: Normal reflexes, CN. Sensory/motor exams grossly normal deficit. Coordination  and gait normal.   MS: No joint effusions.  No peripheral edema.   Left arm in immobilizer  Skin: Skin is warm and dry. No rashes, erythema, diaphoresis.   Psych: Normal mood and affect. Behavior and judgment normal.     DISCHARGE MEDICATIONS     Discharge Medications        CONTINUE taking these medications        Instructions Prescription details   aspirin 81 MG Tbec      Take 1 tablet (81 mg total) by mouth 2 (two) times daily.   Refills: 0     atorvastatin 20 MG Tabs  Commonly known as: Lipitor      Take 1 tablet (20 mg total) by mouth nightly.   Refills: 0     Doxycycline Monohydrate 100 MG Tabs      Take 100 mg by mouth 2 (two) times daily. for infection prevention.   Refills: 0     HYDROcodone-acetaminophen 5-325 MG Tabs  Commonly known as: Norco      Take 1 tablet by mouth every 4 to 6 hours as needed for Pain.   Refills: 0            STOP taking these medications      Aleve 220 MG Tabs  Generic drug: naproxen        lisinopril 10 MG Tabs  Commonly known as: Zestril                 CONSULTANTS  Consultants         Provider   Role Specialty     Hugo De Jesus MD      Consulting Physician Physical Medicine     Leon Piña MD      Consulting Physician Surgery, Orthopaedic            FOLLOW UP:  Leon Piña MD  [address] [place] [address]  [phone]    Schedule an appointment as soon as possible for a visit in 2 week(s)  post op    Hulesch, William  [address] [address] [phone]    Follow up in 2 week(s)  Post Discharge Followup    The above plan and follow-up instructions were reviewed with the patient and they verbalized understanding and agreement.  They understand to return to the emergency room for any concerning signs or symptoms.  Greater than 30 minutes spent on discharge.  -----------------------    Hospital Discharge Diagnoses: Closed fracture of head of left humerus, initial encounter    Lace+ Score: 58  59-90 High Risk  29-58 Medium Risk  0-28   Low Risk.    TCM Follow-Up Recommendation:  LACE >  58: High Risk of readmission after discharge from the hospital.

## 2025-02-19 NOTE — PROGRESS NOTES
[facility]  part of [facility]    Progress Note    Clyde Castro Patient Status:  Inpatient    1955 MRN [id number]   Location [facility] 4W/SW/SE Attending Vaishnavi Trammell MD   Hosp Day # 5 PCP WILLIAM HULESCH     Chief complaint: Shoulder fracture  Subjective:     Pains reasonably controlled, but recovery slow  No fevers  constipated       Objective:   Blood pressure 107/67, pulse 82, temperature 98.9 °F (37.2 °C), temperature source Oral, resp. rate 18, height 5' 3\" (1.6 m), weight 211 lb (95.7 kg), SpO2 98%.    HEENT: conjunctivae/corneas clear. PERRL, EOM's intact.  Neck: no adenopathy, no carotid bruit, no JVD, supple  Pulmonary:  clear to auscultation bilaterally  Cardiovascular: S1, S2 normal, +loud heart murmur, no gallop, regular rate and rhythm  Abdominal: soft, non-tender; bowel sounds normal; no masses,  no organomegaly  Extremities: no cyanosis or edema, L shoulder in sling  Pulses: palpable and symmetric  Skin: No rashes or lesions, +bruising under both of her breast Rt>L  Neurologic: Alert and oriented X 3,    Psychiatric: calm, cooperative    Assessment and Plan:     Closed fracture of head of left humerus, initial encounter  H/o uneventful L shoulder total arthroplasty on 25  -s/p OR today  with FADI Negrete reverse total shoulder arthroplasty  -doing well, but recovery slow  -pain control  -monitor hgb, stable ~11.3-->10.7 post op  -Initial leukocytosis improved  -bowel protocol  -DVT proph with heparin subcutaneous  -held naproxen and ASA prior surgery, restarted post op as per ortho  -cont proph doxy (from recent surgery)  -s/p dose of fluconazole per patient request (to prevent a yeast infection)    Bruising  under both of her breast Rt>L  Suspect either result of initial fall at home or possibly from tight sling/shoulder immobilizer prior surgery  -monitor size, and hgb     BRYANNA, per protocol  HTN, wnl, at home actually on lisinopril 10 mg,  now held as BP on lower side, monitor    HL, on statins  Reflux      H/o severe aortic stenosis, asymptomatic  Follows with Dr. Marino at [facility]  Previous stress test ok  -avoid hypotension  -otherwise, pt tolerated previous surgery well, ok to proceed  -tele perioperatively   -Hold lisinopril prior surgery, BP low normal     Dispo: pending progress, per PT/OT would benefit from acute rehab, stable medically for discharge, but no bed available yet.               Supplementary Documentation:   DVT Mechanical Prophylaxis:   SCDs, Early ambuation  DVT Pharmacologic Prophylaxis   Medication   None         DVT Pharmacologic prophylaxis: Aspirin 81 mg      Code Status: Full Code  Calle: No urinary catheter in place  Calle Duration (in days):   Central line:    DOUG: 2/19/2025                        Chart reviewed, including current vitals, notes, labs and imaging  Labs ordered and medications adjusted as outlined above  Home medications reconciliation completed  Coordinate care with care team/consultants  D/w RN     MDM moderate      Results:     Lab Results   Component Value Date    WBC 10.8 02/17/2025    HGB 10.7 (L) 02/17/2025    HCT 32.3 (L) 02/17/2025    .0 02/17/2025    CREATSERUM 0.75 02/17/2025    BUN 17 02/17/2025     (L) 02/17/2025    K 4.2 02/17/2025     02/17/2025    CO2 23.0 02/17/2025     (H) 02/17/2025    CA 9.0 02/17/2025    ALB 4.3 02/17/2025    ALKPHO 61 02/13/2025    BILT 0.4 02/13/2025    TP 6.3 02/13/2025    AST 17 02/13/2025    ALT 15 02/13/2025    MG 2.0 02/17/2025    PHOS 3.6 02/17/2025       No results found.          KERRI HUFFMAN MD  2/18/2025

## 2025-02-19 NOTE — CM/SW NOTE
02/19/25 1009   Discharge disposition   Expected discharge disposition [facility]hab [facility]   Post Acute Care Provider [facility]   Discharge transportation Private car     Received update from pt's RN Nicole - confirmed pt's BF will transport her to [facility] around 3PM.    Liaison Gabe updated.    Pt is cleared from SW/CM stand point.    ROOM #: [facility]  Report phone #: [phone]        PLAN: [facility], BF to transport around 3PM        SW/CM to remain available for support and/or discharge planning.            SAMIR Marinelli, LSW [id number]

## 2025-02-19 NOTE — CM/SW NOTE
10:05AM  Pt has DC order in place. Per SOREN Jiang's note on 2/18, pt would prefer home w/ no services if [facility] does not have a bed or will not for a while.    Per liayves ruiz/ [facility] - hoping for bed tomorrow but likely Friday for pt. They will need CBC/BMP labs tomorrow 2/20.    Met w/ pt at bedside and informed her of above. She confirmed [facility] rep met w/ her and updated her as well.    Pt confirmed she wants to wait for bed at [facility] as that is her safest option.    Notified RN of plan and need for CBC/BMP.    Update from nj Bernal - more likely bed available tomorrow but will update team when confirmed.    SW to spoke Aamir ruiz/  - [facility] set on WILL CALL through 2/21. OOP quote is $90. PCS completed and will need date added day of actual DC.    10:40AM  Received notice from Gabe ruiz/ [facility] - they have bed available today and they can accept around 3PM.    RN updated. Informed her and pt of [facility] cost for $90. Pt is going to decide if she wants [facility] or family/friend to transport.     RN to update SW.      ROOM #: [id number]  Report phone #: [phone]      PLAN: [facility], [facility] on WC, PCS needs date vs family/friend transport at 3PM - pending final transportation from pt      SW/CM to remain available for support and/or discharge planning.         MS IsisW, LSW [id number]

## 2025-02-19 NOTE — PLAN OF CARE
Pt alert and oriented x4 on room air w/ CPAP overnight. Dressing in place. Sling/immobilizer in place. PRN oxy for pain. Up standby with walker. Call light within reach.    Problem: Patient Centered Care  Goal: Patient preferences are identified and integrated in the patient's plan of care  Description: Interventions:  - What would you like us to know as we care for you?   - Provide timely, complete, and accurate information to patient/family  - Incorporate patient and family knowledge, values, beliefs, and cultural backgrounds into the planning and delivery of care  - Encourage patient/family to participate in care and decision-making at the level they choose  - Honor patient and family perspectives and choices  Outcome: Progressing     Problem: Patient/Family Goals  Goal: Patient/Family Long Term Goal  Description: Patient's Long Term Goal: go home    Interventions:  - surgery of left shoulder  - manage pain  - monitor vitals and labs  - maximize mobility    - See additional Care Plan goals for specific interventions  Outcome: Progressing  Goal: Patient/Family Short Term Goal  Description: Patient's Short Term Goal: manage pain    Interventions:   - PRN pain medication  - sling  - Ice packs    - See additional Care Plan goals for specific interventions  Outcome: Progressing     Problem: PAIN - ADULT  Goal: Verbalizes/displays adequate comfort level or patient's stated pain goal  Description: INTERVENTIONS:  - Encourage pt to monitor pain and request assistance  - Assess pain using appropriate pain scale  - Administer analgesics based on type and severity of pain and evaluate response  - Implement non-pharmacological measures as appropriate and evaluate response  - Consider cultural and social influences on pain and pain management  - Manage/alleviate anxiety  - Utilize distraction and/or relaxation techniques  - Monitor for opioid side effects  - Notify MD/LIP if interventions unsuccessful or patient reports new  pain  - Anticipate increased pain with activity and pre-medicate as appropriate  Outcome: Progressing     Problem: RISK FOR INFECTION - ADULT  Goal: Absence of fever/infection during anticipated neutropenic period  Description: INTERVENTIONS  - Monitor WBC  - Administer growth factors as ordered  - Implement neutropenic guidelines  Outcome: Progressing     Problem: SAFETY ADULT - FALL  Goal: Free from fall injury  Description: INTERVENTIONS:  - Assess pt frequently for physical needs  - Identify cognitive and physical deficits and behaviors that affect risk of falls.  - [place] fall precautions as indicated by assessment.  - Educate pt/family on patient safety including physical limitations  - Instruct pt to call for assistance with activity based on assessment  - Modify environment to reduce risk of injury  - Provide assistive devices as appropriate  - Consider OT/PT consult to assist with strengthening/mobility  - Encourage toileting schedule  Outcome: Progressing     Problem: DISCHARGE PLANNING  Goal: Discharge to home or other facility with appropriate resources  Description: INTERVENTIONS:  - Identify barriers to discharge w/pt and caregiver  - Include patient/family/discharge partner in discharge planning  - Arrange for needed discharge resources and transportation as appropriate  - Identify discharge learning needs (meds, wound care, etc)  - Arrange for interpreters to assist at discharge as needed  - Consider post-discharge preferences of patient/family/discharge partner  - Complete POLST form as appropriate  - Assess patient's ability to be responsible for managing their own health  - Refer to Case Management Department for coordinating discharge planning if the patient needs post-hospital services based on physician/LIP order or complex needs related to functional status, cognitive ability or social support system  Outcome: Progressing     Problem: SKIN/TISSUE INTEGRITY - ADULT  Goal: Incision(s),  wounds(s) or drain site(s) healing without S/S of infection  Description: INTERVENTIONS:  - Assess and document risk factors for pressure ulcer development  - Assess and document skin integrity  - Assess and document dressing/incision, wound bed, drain sites and surrounding tissue  - Implement wound care per orders  - Initiate isolation precautions as appropriate  - Initiate Pressure Ulcer prevention bundle as indicated  Outcome: Progressing

## 2025-02-24 NOTE — OPERATIVE REPORT
OP REPORT    PATIENT INFORMATION:    PATIENT NAME: Clyde Castro    YOB: 1955    DATE OF SURGERY: 2/16/2025    SURGERY FACILITY/HOSPITAL: [facility] [place]    PREOPERATIVE DIAGNOSIS:   Left shoulder periprosthetic proximal humerus fracture with component loosening of an anatomic total shoulder.    POSTOPERATIVE DIAGNOSIS:   Left shoulder periprosthetic proximal humerus fracture with component loosening of an anatomic total shoulder.    OPERATIVE PROCEDURES:   Left shoulder revision arthroplasty both components.    OPERATIVE INFORMATION:     SURGEON:  Leon Piña MD.     ASSISTANT(S):  Shamar.     ANESTHESIA:  General plus regional.     TOURNIQUET TIME:  None.     FLUIDS:  See anesthesia record.     ESTIMATED BLOOD LOSS:  200 mL.     COMPLICATIONS:  None.     IMPLANTS:  25 mm full wedge baseplate, 36 mm glenosphere, size 12 fracture stem, +3 poly.     INDICATIONS FOR PROCEDURE:   Clyde is a 69-year-old female who I previously performed a left anatomic total shoulder arthroplasty on 02/11/2025.  She did well following this and was discharged home and unfortunately when she got home approximately 3 days postoperative she sustained a fall onto her left shoulder.  She presented back to the Emergency Department where x-rays demonstrated a proximal humerus fracture with humeral component loosening.  Given the failure of her implant she was indicated for revision surgery.  She understood the risks and benefits of surgery to include infection, neurovascular injury, need for more surgery, persistent pain, functional deficit, instability, failure to improve, CRPS, stiffness and other unpredictable medical complications.  She wished to proceed.    NOTE:      The patient required greater tuberosity reconstruction given the reverse shoulder arthroplasty was performed for a fracture.  This required extensive dissection and suture passage, including the placement of cerclage sutures around the  proximal portion of the humerus to secure the tuberosities to the implant.  Compared to a standard reverse shoulder arthroplasty done for an irreparable rotator cuff tear or glenohumeral arthritis, the operative was more than 200% longer.  Thus, the reverse shoulder arthroplasty will be billed with a modifier 22.            DESCRIPTION OF PROCEDURE:??    Pre op Holding     The patient was met in the preoperative holding area where consents were reviewed, and all questions were answered. Prior proceeding to the operating room the patient’s operative extremity was examined and found to be neurovascularly intact, including the median, radial, ulnar, and axillary nerves. The patient then underwent an interscalene block.            Positioning      The patient was brought to the operating room and placed on the operating table in supine position. ID brace reviewed and identity was confirmed. The anesthesia staff then administered general endotracheal anesthesia without complication.  The patient was then re positioned in a modified beach chair position with care to pad all bony prominences.  Appropriate position of the neck and contralateral extremity were obtained.  At this point, SCD boots were placed on the bilateral lower extremities.  Correct side was confirmed as the operative side. The operative side was prepped and draped sterilely. A final timeout procedure was used to identify the patient, side, and the administration of antibiotics as well as 1g of Tranexamic acid.           Surgical Approach     The prior deltopectoral approach was then carried sharply down to the deltopectoral interval.  The proximal humeral shaft had pierced the deltoid fascia and the deltoid was gently removed from the underlying bone.  The cephalic vein was identified and retracted laterally.  A Koebel retractor was then placed.  The fracture was identified. This included the greater tuberosity with loosening of the humeral component. The  humeral component was removed without difficulty. The subscapularis repair was intact and this was released along with the rotator interval. Next the inferior capsule was identified and released using scissors while protecting the Axillary nerve in between the plane of the subscapularis tendon and the capsule.  This was carried down to the 6 o’clock position.  The scissors were then used along the anterior glenoid neck, and finally used to release rotator interval tissue to the base of the coracoid.  This provided a 360 degree subscapularis release.            Following this, a Adhikari elevator was used to bluntly develop the plane between the greater tuberosity fracture and remaining humeral head fracture. The shoulder was then abducted and externally rotated to gain control over the greater tuberosity fragment, which had the entirety of the posterosuperior rotator cuff attached.  This was tagged and a blunt release above the rotator cuff was performed to improve excursion.  With the subcsapularis and greater tuberosity fragments and their associated rotator cuff insertions well controlled, an anterior glenoid neck retractor and Fukuda retractor were placed to expose the glenoid.  The anterior inferior capsule was also release from the glenoid origin.  This allowed excellent exposure of the glenoid.  An ostotome was used to remove the glenoid component without difficulty. A full wedge glenoid guide was then used to place a guidewire into a 10 degree inferior tilt position through the central peg of the prior implant.  This was then over reamed with a standard reamer.  Preferential inferior reaming confirmed the inferior tilt position.  The central hole was then drilled and sized for the appropriate length 6.5mm diameter screw.  Copious irrigation was then carried out and the implant was assembled on the back table.  It was screwed into position with excellent fixation.  Superior, inferior, drill holes were the placed  with locking screws of the appropriate depth.  The appropriate glenosphere was then impacted and screwed into place with excellent fixation.            Next, attention was turned to the humeral side.  Retractors were removed, and the proximal humerus was delivered into the wound.  Humeral sounders were used to size for the appropriate humeral implant.  A humeral trial was then inserted in 25 degrees of retroversion with the appropriate polyethylene trail.  Full range of motion without instability or impingement was noted with good conjoined tendon tension and deltoid wrap. The humeral trial was then removed. Prior to implanting the humeral component, tuberosity fixation was prepared.  2 x #5 looped non absorbable sutures were placed through the rotator cuff insertion at the junction between the supraspinatus and infraspinatus, and infraspinatus and teres minor, respectively. 2 additional looped sutures were then placed adjacent to these and taken through the rotator cuff insertion of the subscapularis on the lesser tuberosity. Finally, 2 x #5 looped sutures were placed around the proximal portion of the humeral shaft in a cerclage fashion to serve as vertical fixation points for the tuberosities.            Bone graft was then taken from the patient’s humeral head that was removed and placed into the window of the prosthesis to assist with tuberosity healing.  The stem was implanted in 25 degrees of retroversion to the level of the trial.  A trial polyethylene was placed, and the joint was reduced and ranged again with excellent stability without impingement.  The joint was again dislocated, and the final polyethylene was impacted onto the humeral stem, and the joint reduced.  Again, the joint was ranged with excellent stability in all planes.           Attention was then turned to tuberosity reconstruction.  The shoulder was placed into an abducted and externally rotated position and the posterior superior rotator  cuff and greater tuberosity fragment was reduced to the implant after placing humeral head autograft cancellous bone between the implant and the reduced tuberosity.  The two looped sutures passed through the greater tuberosity and the implant were then tensioned, tied, and cut.  Following this, the two looped sutures placed through the greater and lesser tuberosity fragments were tension, tied, and cut after bone grafting beneath the lesser tuberosity fragment.  Following this the shoulder was internally and externally rotated and there was no evidence of macromotion of the tuberosities.  Following this the arm was placed in neutral rotation, and a free needle was used to pass the free ends of the cerclage suture in a figure of eight fashion.  One was taken through the greater tuberosity and the other through the lesser tuberosity, and then tied back on itself to provide resistance to superior escape of the tuberosities.  Finally, the two tag stitches placed at the beginning of the procedure were tied together.  Following tuberosity reconstruction, the shoulder was taken through range of motion with excellent stability, no macromotion of the tuberosities with rotation or forward elevation, and no impingement.            The wound was then copiously irrigated with pulse lavage, and 1 g Vancomycin powder was placed into the wound for infection prophylaxis.  The deltopectoral interval was then closed with running 0-Vicryl, the subcutaneous layer with 2-0 Monocryl, and skin was closed with 3-0 monocryl in running fashion.  A sterile dressing was applied, and the patient was placed into an abduction sling, awakened from anesthesia and transferred back to the recovery room in stable condition.            Postoperatively, the patient will remain in the sling for 4-6 weeks and follow the protocol for physical therapy.  I was present for all critical portions of the case.  There were no complications.            A qualified  assistant was required during the case for assistance in patient transfer, patient positioning, assistance with the open procedure, wound closure dressing and brace application.           Leon Piña MD    This document was digitally reviewed and approved by: Leon Piña MD    A: 2/23/2025 8:04:02 PM

## 2025-02-24 NOTE — OPERATIVE REPORT
OP REPORT    PATIENT INFORMATION:    PATIENT NAME: Clyde Castro    YOB: 1955    DATE OF SURGERY: 2/16/2025    SURGERY FACILITY/HOSPITAL: [facility]    PREOPERATIVE DIAGNOSIS:   Left shoulder periprosthetic proximal humerus fracture with component loosening of an anatomic total shoulder.    POSTOPERATIVE DIAGNOSIS:   Left shoulder periprosthetic proximal humerus fracture with component loosening of an anatomic total shoulder.    OPERATIVE PROCEDURES:   Left shoulder revision arthroplasty both components.    OPERATIVE INFORMATION:     SURGEON:  Leon Piña MD.     ASSISTANT(S):  Shamar.     ANESTHESIA:  General plus regional.     TOURNIQUET TIME:  None.     FLUIDS:  See anesthesia record.     ESTIMATED BLOOD LOSS:  200 mL.     COMPLICATIONS:  None.     IMPLANTS:  25 mm full wedge baseplate, 36 mm glenosphere, size 12 fracture stem, +3 poly.     INDICATIONS FOR PROCEDURE:   Clyde is a 69-year-old female who I previously performed a left anatomic total shoulder arthroplasty on 02/11/2025.  She did well following this and was discharged home and unfortunately when she got home approximately 3 days postoperative she sustained a fall onto her left shoulder.  She presented back to the Emergency Department where x-rays demonstrated a proximal humerus fracture with humeral component loosening.  Given the failure of her implant she was indicated for revision surgery.  She understood the risks and benefits of surgery to include infection, neurovascular injury, need for more surgery, persistent pain, functional deficit, instability, failure to improve, CRPS, stiffness and other unpredictable medical complications.  She wished to proceed.    NOTE:      The patient required greater tuberosity reconstruction given the reverse shoulder arthroplasty was performed for a fracture.  This required extensive dissection and suture passage, including the placement of cerclage sutures around the  proximal portion of the humerus to secure the tuberosities to the implant.  Compared to a standard reverse shoulder arthroplasty done for an irreparable rotator cuff tear or glenohumeral arthritis, the operative was more than 200% longer.  Thus, the reverse shoulder arthroplasty will be billed with a modifier 22.            DESCRIPTION OF PROCEDURE:??    Pre op Holding     The patient was met in the preoperative holding area where consents were reviewed, and all questions were answered. Prior proceeding to the operating room the patient’s operative extremity was examined and found to be neurovascularly intact, including the median, radial, ulnar, and axillary nerves. The patient then underwent an interscalene block.            Positioning      The patient was brought to the operating room and placed on the operating table in supine position. ID brace reviewed and identity was confirmed. The anesthesia staff then administered general endotracheal anesthesia without complication.  The patient was then re positioned in a modified beach chair position with care to pad all bony prominences.  Appropriate position of the neck and contralateral extremity were obtained.  At this point, SCD boots were placed on the bilateral lower extremities.  Correct side was confirmed as the operative side. The operative side was prepped and draped sterilely. A final timeout procedure was used to identify the patient, side, and the administration of antibiotics as well as 1g of Tranexamic acid.           Surgical Approach     The prior deltopectoral approach was then carried sharply down to the deltopectoral interval.  The proximal humeral shaft had pierced the deltoid fascia and the deltoid was gently removed from the underlying bone.  The cephalic vein was identified and retracted laterally.  A Koebel retractor was then placed.  The fracture was identified. This included the greater tuberosity with loosening of the humeral component. The  humeral component was removed without difficulty. The subscapularis repair was intact and this was released along with the rotator interval. Next the inferior capsule was identified and released using scissors while protecting the Axillary nerve in between the plane of the subscapularis tendon and the capsule.  This was carried down to the 6 o’clock position.  The scissors were then used along the anterior glenoid neck, and finally used to release rotator interval tissue to the base of the coracoid.  This provided a 360 degree subscapularis release.            Following this, a Adhikari elevator was used to bluntly develop the plane between the greater tuberosity fracture and remaining humeral head fracture. The shoulder was then abducted and externally rotated to gain control over the greater tuberosity fragment, which had the entirety of the posterosuperior rotator cuff attached.  This was tagged and a blunt release above the rotator cuff was performed to improve excursion.  With the subcsapularis and greater tuberosity fragments and their associated rotator cuff insertions well controlled, an anterior glenoid neck retractor and Fukuda retractor were placed to expose the glenoid.  The anterior inferior capsule was also release from the glenoid origin.  This allowed excellent exposure of the glenoid.  An ostotome was used to remove the glenoid component without difficulty. A full wedge glenoid guide was then used to place a guidewire into a 10 degree inferior tilt position through the central peg of the prior implant.  This was then over reamed with a standard reamer.  Preferential inferior reaming confirmed the inferior tilt position.  The central hole was then drilled and sized for the appropriate length 6.5mm diameter screw.  Copious irrigation was then carried out and the implant was assembled on the back table.  It was screwed into position with excellent fixation.  Superior, inferior, drill holes were the placed  with locking screws of the appropriate depth.  The appropriate glenosphere was then impacted and screwed into place with excellent fixation.            Next, attention was turned to the humeral side.  Retractors were removed, and the proximal humerus was delivered into the wound.  Humeral sounders were used to size for the appropriate humeral implant.  A humeral trial was then inserted in 25 degrees of retroversion with the appropriate polyethylene trail.  Full range of motion without instability or impingement was noted with good conjoined tendon tension and deltoid wrap. The humeral trial was then removed. Prior to implanting the humeral component, tuberosity fixation was prepared.  2 x #5 looped non absorbable sutures were placed through the rotator cuff insertion at the junction between the supraspinatus and infraspinatus, and infraspinatus and teres minor, respectively. 2 additional looped sutures were then placed adjacent to these and taken through the rotator cuff insertion of the subscapularis on the lesser tuberosity. Finally, 2 x #5 looped sutures were placed around the proximal portion of the humeral shaft in a cerclage fashion to serve as vertical fixation points for the tuberosities.            Bone graft was then taken from the patient’s humeral head that was removed and placed into the window of the prosthesis to assist with tuberosity healing.  The stem was implanted in 25 degrees of retroversion to the level of the trial.  A trial polyethylene was placed, and the joint was reduced and ranged again with excellent stability without impingement.  The joint was again dislocated, and the final polyethylene was impacted onto the humeral stem, and the joint reduced.  Again, the joint was ranged with excellent stability in all planes.           Attention was then turned to tuberosity reconstruction.  The shoulder was placed into an abducted and externally rotated position and the posterior superior rotator  cuff and greater tuberosity fragment was reduced to the implant after placing humeral head autograft cancellous bone between the implant and the reduced tuberosity.  The two looped sutures passed through the greater tuberosity and the implant were then tensioned, tied, and cut.  Following this, the two looped sutures placed through the greater and lesser tuberosity fragments were tension, tied, and cut after bone grafting beneath the lesser tuberosity fragment.  Following this the shoulder was internally and externally rotated and there was no evidence of macromotion of the tuberosities.  Following this the arm was placed in neutral rotation, and a free needle was used to pass the free ends of the cerclage suture in a figure of eight fashion.  One was taken through the greater tuberosity and the other through the lesser tuberosity, and then tied back on itself to provide resistance to superior escape of the tuberosities.  Finally, the two tag stitches placed at the beginning of the procedure were tied together.  Following tuberosity reconstruction, the shoulder was taken through range of motion with excellent stability, no macromotion of the tuberosities with rotation or forward elevation, and no impingement.            The wound was then copiously irrigated with pulse lavage, and 1 g Vancomycin powder was placed into the wound for infection prophylaxis.  The deltopectoral interval was then closed with running 0-Vicryl, the subcutaneous layer with 2-0 Monocryl, and skin was closed with 3-0 monocryl in running fashion.  A sterile dressing was applied, and the patient was placed into an abduction sling, awakened from anesthesia and transferred back to the recovery room in stable condition.            Postoperatively, the patient will remain in the sling for 4-6 weeks and follow the protocol for physical therapy.  I was present for all critical portions of the case.  There were no complications.            A qualified  assistant was required during the case for assistance in patient transfer, patient positioning, assistance with the open procedure, wound closure dressing and brace application.           Leon Piña MD    This document was digitally reviewed and approved by: Leon Piña MD    A: 2/23/2025 8:04:02 PM

## 2025-02-24 NOTE — OPERATIVE REPORT
OP REPORT    PATIENT INFORMATION:    PATIENT NAME: Clyde Castro    YOB: 1955    DATE OF SURGERY: 2/11/2025    SURGERY FACILITY/HOSPITAL: [facility]    PREOPERATIVE DIAGNOSIS:  Left shoulder osteoarthritis.    POSTOPERATIVE DIAGNOSIS:  Left shoulder osteoarthritis.    OPERATIVE PROCEDURES:   Left shoulder anatomic arthroplasty.  Left shoulder open biceps tenodesis.    OPERATIVE INFORMATION:     SURGEON:  Leon Piña MD.    ASSISTANT(S):  Herminia Eckert PA-C.    ANESTHESIA:  General plus regional.    TOURNIQUET TIME:  None.    FLUIDS:  See anesthesia record.    ESTIMATED BLOOD LOSS:  200 mL.    COMPLICATIONS:  None.    IMPLANTS:  Tornier Small glenoid, 48mm head, size 2 Nucleus  INDICATIONS FOR PROCEDURE:      Clyde presented with ongoing left shoulder pain for greater than a year.  The patient had evidence of primary glenohumeral arthritis with an intact rotator cuff based on exam and advanced imaging.  The patient had trialed and failed longterm conservative treatment in the form of physical therapy, anti-inflammatories as well as intraarticular injection with cortisone.  The patient desires long-term durable relief.  The patient was therefore indicated for a left anatomic total shoulder arthroplasty.  He understood the risks and benefits of surgery to include infection, failure to achieve pain relief, persistent functional deficit, neurovascular injury, rotator cuff failure, need for revision, expected postoperative rehab and limitations of the implants.  The patient desired surgery and wished to proceed.     DESCRIPTION OF PROCEDURE:      The patient was met in the preoperative holding area.  Consents reviewed and all questions were answered.  The patient's operative extremity was neurovascularly intact prior to proceeding to the operating room.  The patient's skin was appropriate for surgery.  The patient was taken to the operating room, positioned supine on the operating  table.  General anesthesia was induced.  The patient was repositioned in the beach chair position with all bony prominences well padded.  Timeout was completed confirmed the operative site, sterility of the field and the administration of preoperative antibiotics.  A 10 blade was used to incise the skin in a standard deltopectoral fashion.  I dissected sharply down to the deltopectoral interval, identified the cephalic vein, retracted this laterally, and protected throughout the case.  I then developed subdeltoid space with release of adhesions.  I then identified the clavipectoral fascia and released this and placed a self-retaining retractor between the deltoid and the conjoint tendon.  The pectoralis was released to approximately 1 cm.  I then identified the biceps, which had significant inflammation and erythema surrounding it.  The biceps was secured to the proximal border of the pectoralis major tendon using #2 Ethibond.  I then released the biceps above this.  This completed open biceps tenodesis.  I then released the rotator interval and performed a subscapularis peel.  At this point, I performed a generous inferior capsular release to 6 o'clock position being mindful of the neurovascular structures medially.  At this point, the shoulder was dislocated with gentle external rotation.  I then performed an anatomic head cut and placed head protector.  I then subluxed the proximal humerus posteriorly and performed a 360-degree subscapularis release, which provided excellent excursion of the tendon.  At this point, the glenoid was exposed with the aid of retractors.  I then used the patient specific guide to place a center guide pin.  Following this, in standard fashion, a Tornier performed and Anatomic Glenoid size Small was placed in standard pegged component fashion.  The peripheral holes were cemented with antibiotic cement and the central Cortiloc device was impacted into place with excellent fixation.  At  this point, the proximal humerus was again delivered into the wound with external rotation.  Following this, the proximal humerus was prepared with a size 2 Tornier Simpliciti Nucleus.  This obtained excellent fixation.  I then trialed humeral head and selected a 48 mm Simpliciti standard humeral head.  This trial had excellent stability throughout range of motion and was noted to remain centered throughout range of motion.  Following this, final humeral head was implanted and the shoulder was thoroughly irrigated.  I then turned my attention to the subscapularis repair.  I created a transosseous tunnel in the lesser tuberosity and repaired the tendon in mattress fashion.  Given the moderate tissue quality, I opted to place a Regeneten bioinductive implant over this repair to aid in healing.  Following this, the shoulder was once again ranged throughout range of motion to ensure stability.  The patient had 155 degrees of forward flexion, external rotation at the side to 45 degrees.  Following this, the hemostasis was obtained and the wound was closed in standard layered fashion.  Sterile compressive dressings were applied and the patient was awakened and taken to PACU in stable condition.  Postoperative x-rays showed excellent alignment of the components without postoperative complication.  The patient's pain was controlled postoperatively.  He was discharged home following an additional dose of IV antibiotics.     Postoperative plan will be sling for two weeks, initiation of therapy following postoperative visit at 2 weeks, aspirin for DVT prophylaxis and pain control.  A qualified assistant was required for the surgery to aid in limb positioning, implant placement and closure of the wound.  Herminia Eckert served this role.            Leon Piña MD    This document was digitally reviewed and approved by: Leon Piña MD    A: 2/23/2025 1:53:44 PM

## 2025-03-04 ENCOUNTER — APPOINTMENT (OUTPATIENT)
Dept: CARDIOLOGY | Age: 70
End: 2025-03-04
Attending: INTERNAL MEDICINE

## 2025-03-06 NOTE — DISCHARGE SUMMARY
[facility]     Clyde Castro Patient Status:  Outpatient in a Bed    1955 MRN [id number]   Location [facility] Attending No att. providers found   Hosp Day # 0 PCP WILLIAM HULESCH     Date of Admission: 2025  Date of Discharge: 2025  Discharge Disposition: home    Admitting Chief Complaint:   Left shoulder osteoarthritis    PCP: WILLIAM HULESCH    Discharge Diagnosis:   1.       Left shoulder primary osteoarthritis status post left anatomic total shoulder arthroplasty, interscalene block.     2.       Obstructive sleep apnea.     3.       Essential hypertension.     4.       Hyperlipidemia.       History of Present Illness:   Per Dr. Klein  The patient is a 69-year-old  female with chronic left shoulder pain and underlying severe glenohumeral osteoarthritis, failed outpatient conservative medical management options, scheduled today for above-mentioned procedure by her orthopedic surgery, Dr. Piña. Preoperatively had interscalene block. Postoperatively transferred to PACU for further monitoring.         Brief Synopsis:   1.       Left shoulder primary osteoarthritis status post left anatomic total shoulder arthroplasty, interscalene block with Dr. Piña on 2015  -recovering very well.  Pain control.  Neurovascular checks.  DVT prophylaxis.  Physical and occupational therapy.  Of note, allergy to ibuprofen, but tolerates naproxen well    2.       Obstructive sleep apnea.  Apply obstructive sleep apnea protocol and monitor.  3.       Essential hypertension.  Continue home medications and monitor.  4.       Hyperlipidemia.  Continue home medications.           Discharge Medication List:     Discharge Medications        CONTINUE taking these medications        Instructions Prescription details   atorvastatin 20 MG Tabs  Commonly known as: Lipitor      Take 1 tablet (20 mg total) by mouth nightly.   Refills: 0            STOP taking these  medications      aspirin 325 MG Tabs                 Follow-up appointment:   No follow-up provider specified.    Vital signs:       Physical Exam:    General: No acute distress.   Respiratory: Clear to auscultation bilaterally. No wheezes. No rhonchi.  Cardiovascular: S1, S2. Regular rate and rhythm. No murmurs, rubs or gallops.   Abdomen: Soft, nontender, nondistended.  Positive bowel sounds. No rebound or guarding.  Neurologic: No focal neurological deficits.   Musculoskeletal: Moves all extremities.  Extremities: No edema.  -----------------------------------------------------------------------------------------------  PATIENT DISCHARGE INSTRUCTIONS: See electronic chart    I d/w pt  the available results, management plan, medications changes, and discharge instructions including follow ups as outlined above.          Hospital Discharge Diagnoses:  post op    Lace+ Score: 16  59-90 High Risk  29-58 Medium Risk  0-28   Low Risk.    TCM Follow-Up Recommendation:  LACE < 29: Low Risk of readmission after discharge from the hospital. No TCM follow-up needed.        KERRI HUFFMAN MD 3/6/2025    Time spent:  > 30 minutes

## 2025-03-10 DIAGNOSIS — E78.2 MIXED HYPERLIPIDEMIA: ICD-10-CM

## 2025-03-10 RX ORDER — ATORVASTATIN CALCIUM 20 MG/1
TABLET, FILM COATED ORAL
Qty: 90 TABLET | Refills: 3 | Status: SHIPPED | OUTPATIENT
Start: 2025-03-10

## 2025-03-13 ENCOUNTER — TELEPHONE (OUTPATIENT)
Dept: FAMILY MEDICINE | Age: 70
End: 2025-03-13

## 2025-04-04 ENCOUNTER — EXTERNAL LAB (OUTPATIENT)
Dept: HEALTH INFORMATION MANAGEMENT | Facility: OTHER | Age: 70
End: 2025-04-04

## 2025-04-04 LAB — LAB RESULT: NORMAL

## 2025-04-22 ENCOUNTER — APPOINTMENT (OUTPATIENT)
Dept: CARDIOLOGY | Age: 70
End: 2025-04-22
Attending: INTERNAL MEDICINE

## 2025-04-22 DIAGNOSIS — I35.0 NONRHEUMATIC AORTIC VALVE STENOSIS: ICD-10-CM

## 2025-04-22 LAB
AORTIC VALVE AREA (AVA): 0.77
ASCENDING AORTA (AAD): 3
AV MEAN PRESS GRAD SYS DOP V1V2: 29 MM[HG]
AV MEAN VELOCITY (AVMV): 2.47
AV ORIFICE AREA US: 1.21 CM2
AV PEAK GRADIENT (AVPG): 62
AV STENOSIS SEVERITY TEXT: NORMAL
AV VMAX SYS DOP: 3.94
AVI LVOT PEAK GRADIENT (LVOTMG): 1
E WAVE DECELARATION TIME (MDT): 15.8
INTERVENTRICULAR SEPTUM IN END DIASTOLE (IVSD): 2.28
LEFT INTERNAL DIMENSION IN SYSTOLE (LVSD): 1
LEFT VENTRICULAR INTERNAL DIMENSION IN DIASTOLE (LVDD): 3.5
LEFT VENTRICULAR POSTERIOR WALL IN END DIASTOLE (LVPW): 4.9
LV EF 2D ECHO EST: NORMAL %
LVOT 2D (LVOTD): 27.4
MV E TISSUE VEL LAT (MELV): 0.59
MV E TISSUE VEL MED (MESV): 10.4
MV E WAVE VEL/E TISSUE VEL MED(MSR): 4.88
MV PEAK A VELOCITY (MVPAV): 199
MV PEAK E VELOCITY (MVPEV): 0.99
RV END SYSTOLIC LONGITUDINAL STRAIN FREE WALL (RVGS): 2.4
TRICUSPID VALVE ANNULAR PEAK VELOCITY (TVAPV): 30
TRICUSPID VALVE PEAK REGURGITATION VELOCITY (TRPV): 3.9
TV ESTIMATED RIGHT ARTERIAL PRESSURE (RAP): 10.7

## 2025-04-22 PROCEDURE — 93306 TTE W/DOPPLER COMPLETE: CPT | Performed by: INTERNAL MEDICINE

## 2025-04-25 ENCOUNTER — RESULTS FOLLOW-UP (OUTPATIENT)
Dept: CARDIOLOGY | Age: 70
End: 2025-04-25

## 2025-05-06 ENCOUNTER — APPOINTMENT (OUTPATIENT)
Dept: CARDIOLOGY | Age: 70
End: 2025-05-06

## 2025-05-06 VITALS
OXYGEN SATURATION: 97 % | WEIGHT: 209.77 LBS | DIASTOLIC BLOOD PRESSURE: 75 MMHG | HEART RATE: 74 BPM | SYSTOLIC BLOOD PRESSURE: 132 MMHG | HEIGHT: 63 IN | BODY MASS INDEX: 37.17 KG/M2

## 2025-05-06 DIAGNOSIS — E55.9 HYPOVITAMINOSIS D: ICD-10-CM

## 2025-05-06 DIAGNOSIS — I35.0 NONRHEUMATIC AORTIC VALVE STENOSIS: ICD-10-CM

## 2025-05-06 DIAGNOSIS — I65.23 BILATERAL CAROTID ARTERY STENOSIS: ICD-10-CM

## 2025-05-06 DIAGNOSIS — E78.2 MIXED HYPERLIPIDEMIA: ICD-10-CM

## 2025-05-06 DIAGNOSIS — I10 BENIGN ESSENTIAL HYPERTENSION: Primary | ICD-10-CM

## 2025-05-06 DIAGNOSIS — C54.1 ENDOMETRIAL CANCER  (CMD): ICD-10-CM

## 2025-05-06 PROCEDURE — 99215 OFFICE O/P EST HI 40 MIN: CPT | Performed by: INTERNAL MEDICINE

## 2025-05-06 RX ORDER — CETIRIZINE HYDROCHLORIDE 10 MG/1
TABLET ORAL
COMMUNITY
Start: 2025-04-20

## 2025-05-06 SDOH — HEALTH STABILITY: PHYSICAL HEALTH: ON AVERAGE, HOW MANY MINUTES DO YOU ENGAGE IN EXERCISE AT THIS LEVEL?: 0 MIN

## 2025-05-06 SDOH — HEALTH STABILITY: PHYSICAL HEALTH: ON AVERAGE, HOW MANY DAYS PER WEEK DO YOU ENGAGE IN MODERATE TO STRENUOUS EXERCISE (LIKE A BRISK WALK)?: 0 DAYS

## 2025-05-20 DIAGNOSIS — Z12.11 COLON CANCER SCREENING: ICD-10-CM

## 2025-06-05 ENCOUNTER — TELEPHONE (OUTPATIENT)
Dept: FAMILY MEDICINE | Age: 70
End: 2025-06-05

## 2025-11-10 ENCOUNTER — APPOINTMENT (OUTPATIENT)
Dept: CARDIOLOGY | Age: 70
End: 2025-11-10
Attending: INTERNAL MEDICINE

## 2025-11-18 ENCOUNTER — APPOINTMENT (OUTPATIENT)
Dept: CARDIOLOGY | Age: 70
End: 2025-11-18

## (undated) DEVICE — GUIDEPIN ORTH L75MM DIA3MM FOR IM CUT GUID

## (undated) DEVICE — GLOVE SURG 7.5 PROTEXIS LF BLUE PF SMTH BEAD CUFF INTLK STRL

## (undated) DEVICE — POUCH INST LONG 18X10IN 2 ADH STRIP 3 CMPRT STRL STRDRP

## (undated) DEVICE — GAMMEX® NON-LATEX PI ORTHO SIZE 8, STERILE POLYISOPRENE POWDER-FREE SURGICAL GLOVE: Brand: GAMMEX

## (undated) DEVICE — TOWEL,OR,DSP,ST,BLUE,DLX,2/PK,40PK/CS: Brand: MEDLINE

## (undated) DEVICE — NEEDLE INSFL 14GA 100MM TROCAR BLDLS RADL EXPAND SLV BLUNT

## (undated) DEVICE — COVER,MAYO STAND,STERILE: Brand: MEDLINE

## (undated) DEVICE — DRESSING HYDRCOLL 3.25X6IN TRNSLUC

## (undated) DEVICE — GLOVE SURG 6.5 PROTEXIS LF BLUE PF SMTH BEAD CUFF INTLK STRL

## (undated) DEVICE — DRAPE .75 SHT FNFLD 76X52IN SURG CNVRT STRL LF DISP TIBURON

## (undated) DEVICE — SHEET,DRAPE,53X77,STERILE: Brand: MEDLINE

## (undated) DEVICE — 3M™ COBAN™ NL STERILE NON-LATEX SELF-ADHERENT WRAP, 2084S, 4 IN X 5 YD (10 CM X 4,5 M), 18 ROLLS/CASE: Brand: 3M™ COBAN™

## (undated) DEVICE — PAD DRSG 8X3IN CRD POLY CTN ABS PERFORATE FILM LF STRL

## (undated) DEVICE — IRRIGATOR SCT STRKFL 2 STRL LF DISP

## (undated) DEVICE — SYRINGE 50ML GRAD N-PYRG DEHP-FR PVC FREE STRL MED LF DISP

## (undated) DEVICE — APPLICATOR PREP 26ML CHG 2% ISO ALC 70%

## (undated) DEVICE — DRAPE SHEET LG

## (undated) DEVICE — PACK CDS ANTERIOR HIP

## (undated) DEVICE — ADHESIVE PREMIERPRO EXOFIN 1ML HVISC TUBE SOFT FLXB APL

## (undated) DEVICE — SEAL CANNULA 5-12MM DISP

## (undated) DEVICE — COVER,TABLE,60X90,STERILE: Brand: MEDLINE

## (undated) DEVICE — GLOVE SURG 6.5 PROTEXIS LF CRM PF BEAD CUFF STRL PLISPRN

## (undated) DEVICE — DRESSING SURG W9XL25CM SIL SP CVR WTRPRF VIR

## (undated) DEVICE — GAMMEX® PI HYBRID SIZE 7.5, STERILE POWDER-FREE SURGICAL GLOVE, POLYISOPRENE AND NEOPRENE BLEND: Brand: GAMMEX

## (undated) DEVICE — HOOD: Brand: FLYTE

## (undated) DEVICE — BREAKAWAY NOZZLE: Brand: REVOLUTION

## (undated) DEVICE — SOLUTION IRRIG 3000ML 0.9% NACL FLX CONT

## (undated) DEVICE — NEEDLE SPNL 22GA 3.5IN PVC FREE DEHP-FR STRL LF SPNCN

## (undated) DEVICE — SYSTEM GRIPPER SELF RETRACTING

## (undated) DEVICE — WATER STRL 500ML PLASTIC POUR BTL LF

## (undated) DEVICE — ZZ-CONVERTED-TO-522442- SPONGE 4X4 10PK

## (undated) DEVICE — SLRDIVR LAPSCP 37CM LGSR BLUNT TIP REPRO LF

## (undated) DEVICE — SYSTEM FLUID MGMT FLUENT NS LF DISP

## (undated) DEVICE — SUTURE MONOCRYL MTPS 4-0 PS2 18IN MONO ABS UNDYED

## (undated) DEVICE — NEEDLE FLTR 18GA 1.5IN 5UM REG WALL BLUNT BVL LL HUB DEHP-FR

## (undated) DEVICE — SUTURE VCRL SZ 0 L36IN ABSRB VLT L36MM CT-1

## (undated) DEVICE — DEVICE REM MYOSURE REACH HYSCP PLP FIBROIDS

## (undated) DEVICE — BAG 14CM C125ML 8MM ANCH TISS RTRVL SYS SPEC RTRVL 5.1CM

## (undated) DEVICE — SUTURE MCRYL SZ 2-0 L36IN ABSRB UD L36MM CT-1

## (undated) DEVICE — CATHETER SURG 6FR CLPNM OCLDR PNMPRTN SIL RUMI 2 KOH-EFCT

## (undated) DEVICE — SYRINGE 50ML ECNTRC TIP STRL MED LF DISP BD

## (undated) DEVICE — DRAPE CLMN EQUIPMENT DA VINCI XI

## (undated) DEVICE — PORT HAND ACC 120MM BLDLS OPTC TIP LOWPRFL OBT AIRSEAL 8MM

## (undated) DEVICE — 450 ML BOTTLE OF 0.05% CHLORHEXIDINE GLUCONATE IN 99.95% STERILE WATER FOR IRRIGATION, USP AND APPLICATOR.: Brand: IRRISEPT ANTIMICROBIAL WOUND LAVAGE

## (undated) DEVICE — SUT COAT VCRL 0 27IN CP-1 ABSRB UD 36MM 1/2

## (undated) DEVICE — ELECTRODE PT RTN C30- LB 15FT CORD ADH STRIP VALLEYLAB REM

## (undated) DEVICE — ANTIBACTERIAL UNDYED BRAIDED (POLYGLACTIN 910), SYNTHETIC ABSORBABLE SUTURE: Brand: COATED VICRYL

## (undated) DEVICE — SYRINGE 1ML SLIP TIP STRL TB LF DISP

## (undated) DEVICE — GUIDEWIRE ORTH 2.5X220MM DISP AEQUALIS TORNIER PERFORM

## (undated) DEVICE — DRAPE ARM 21X19X10.5IN EQUIPMENT DA VINCI XI 21LB

## (undated) DEVICE — SUT MCRYL 3-0 27IN ABSRB UD L24MM PS-1

## (undated) DEVICE — TRAY CATH SURESTEP LUBRI-SIL STLK 16FR 350ML FOLEY URMTR

## (undated) DEVICE — SET TBG 3 LUM FLTR AIRSEAL STRL LF ACT CHRCL DISP

## (undated) DEVICE — HANDLE SUR BLU PLAS LT FLX SLIP ON ST DISP

## (undated) DEVICE — NEEDLE SPNL 18GA 3.5IN PVC FREE DEHP-FR STRL LF SPNCN

## (undated) DEVICE — GLOVE SURG 7.5 PROTEXIS LF CRM PF SMTH BEAD CUFF STRL

## (undated) DEVICE — BLADE SURG 11 STRL SS

## (undated) DEVICE — SUTURE VCRL SZ 1 L18IN ABSRB UD L36MM CT-1

## (undated) DEVICE — DRAPE UNDER BUTT FLTR SCRN FL CNTRL POUCH DRN PORT GRAD

## (undated) DEVICE — WRAP COOLING SHLDR W/ICE PILLO

## (undated) DEVICE — SUTURE QUILL SZ 2 L2IN ABSRB VLT L48MM 1/2

## (undated) DEVICE — HANDPIECE IRRIG BTTRY OPERATED TYP W/ SUCT HI

## (undated) DEVICE — BANDAGE,GAUZE,BULKEE II,4.5"X4.1YD,STRL: Brand: MEDLINE

## (undated) DEVICE — REP KT LG OPN SUT (8 SUT 6X2.2MM NICELOOP 2X1.4MM TAPE + 2 NDL)

## (undated) DEVICE — WOUND RETRC (C8402)

## (undated) DEVICE — 3M™ STERI-DRAPE™ INCISE DRAPE 1050 (60CM X 45CM): Brand: STERI-DRAPE™

## (undated) DEVICE — GOWN SURG LG L3 NONREINFORCE SET IN SLV STRL LF DISP BLUE

## (undated) DEVICE — SUTURE ETHBND EXCEL SZ 2 L30IN NONABSORBABLE MX69G

## (undated) DEVICE — BLADE SAW 19X90X1.27G

## (undated) DEVICE — SYRINGE 10ML CNTRL CONC TIP PYROGEN FREE DEHP-FR STRL MED LF

## (undated) DEVICE — BIT DRL 3.2MM PERIPH SCR AEQUALIS REV

## (undated) DEVICE — TOWEL OR BLU 16X26IN 4PK

## (undated) DEVICE — DRAPE SRG U 124X80IN U REINF

## (undated) DEVICE — PENCIL SMKEVC COAT PSHBTN LF

## (undated) DEVICE — SOLUTION IRRIG 1000ML 0.9% NACL USP BTL

## (undated) DEVICE — OBTURATOR LAPSCP 8MM WECK VISTA DISP BLDLS STRL LF

## (undated) DEVICE — Device

## (undated) DEVICE — GLOVE GAMMEX PI HYBRID LF 8.0

## (undated) DEVICE — SYRINGE 30ML CONC TIP GRAD N-PYRG DEHP-FR STRL MED LF DISP

## (undated) DEVICE — NEEDLE HPO 22GA 1.5IN BVL ORT SELF LVL SHTH SFSHLD STRL LF

## (undated) DEVICE — NEEDLE HPO 16GA 1.5IN REG WALL REG BVL LL HUB DEHP-FR STRL

## (undated) DEVICE — NEEDLE SPNL 18GA L3.5IN PNK HUB SS RW FIT

## (undated) DEVICE — HOOD STERI-SHIELD 408-800-000

## (undated) DEVICE — TIP ELECTROCAUTERY HOT SHR DA VINCI EWRST 8MM STD CAUT MNPLR

## (undated) DEVICE — SET IRR .188IN 82IN 4.5IN CONTINUOUS OVRHD DIST FLXB CNCT

## (undated) DEVICE — GLOVE SURG 8 PROTEXIS LF CRM PF BEAD CUFF STRL PLISPRN 12IN

## (undated) DEVICE — TRAY SURG VAG LF

## (undated) DEVICE — SUTURE MCRYL SZ 3-0 L27IN ABSRB UD L24MM PS-1

## (undated) DEVICE — SUT ETHBND XL 5 30IN V-40 NABSRB GRN L48MM 1/

## (undated) DEVICE — SYSTEM IMG CLEARIFY 8X6IN WARM HUB TROCAR WIPE MRFBR DISP

## (undated) DEVICE — ADHESIVE SKIN TOP FOR WND CLSR DERMBND ADV

## (undated) DEVICE — FEMORAL CANAL BRUSH, IRRIGATION/SUCTION

## (undated) DEVICE — SUT ETHBND XL 2 30IN V-37 NABSRB GRN 40MM 1/2

## (undated) DEVICE — Device: Brand: STABLECUT®

## (undated) DEVICE — GLOVE SURG 7 PROTEXIS LF CRM PF BEAD CUFF STRL PLISPRN 12IN

## (undated) DEVICE — HANDPIECE SET WITH HIGH FLOW TIP AND SUCTION TUBE: Brand: INTERPULSE

## (undated) DEVICE — BEACH CHAIR MASK SGL USE

## (undated) DEVICE — DRAPE,U/ SHT,SPLIT,PLAS,STERIL: Brand: MEDLINE

## (undated) DEVICE — DEVICE V-LOC 180 12IN 0 GS-22 ABS GRN CLSR

## (undated) DEVICE — SOLUTION SURGSCRB 4OZ 4% CHG BTL LIQUID ANTIMICROBIAL

## (undated) DEVICE — MANIPULATOR UT 32MM VCARE + SM CRV CUP

## (undated) DEVICE — APPLICATOR SKIN PREP 26ML HI LT ORNG 2% CHG

## (undated) DEVICE — SPONGE LAP 18X18IN WHT COT 4 PLY FLD STRUNG

## (undated) DEVICE — SYRINGE,TOOMEY,IRRIGATION,70CC,STERILE: Brand: MEDLINE

## (undated) DEVICE — BLADE SURG NO10 SS REUSE HNDL CONVENTIONAL

## (undated) DEVICE — SYSTEM IRRISEPT WND IRR 0.05%

## (undated) DEVICE — SYRINGE 10ML GRAD N-PYRG DEHP-FR PVC FREE STRL MED LF DISP

## (undated) DEVICE — HOOD STERI-SHIELD 408-800-100

## (undated) DEVICE — GOWN SURG XL DISP LEV 3 AERO BLU RAGLAN SL

## (undated) DEVICE — POUCH IRRIG 19X23IN TRNSLUC MATTE FNSH

## (undated) DEVICE — GLOVE GAMMEX PI ORTHO LF 8.5

## (undated) DEVICE — CONTAINER SPEC 4OZ 2.5X2.75IN OR POS INDCTR TMPR EVD LEAK

## (undated) DEVICE — PACK CDS SHOULDER

## (undated) DEVICE — SOLUTION PREP 26ML 0.7% POVACRYLEX 74% ISO

## (undated) DEVICE — GOWN SURG XL L3 NONREINFORCE SET IN SLV STRL LF DISP BLUE

## (undated) DEVICE — DRAPE PACK CHEST

## (undated) DEVICE — SYRINGE MED 20ML CLR PLAS LUERLOCK TIP DISP

## (undated) DEVICE — PENCIL ES BTTN SWCH W/ TIP HOLSTER E-Z CLN

## (undated) NOTE — LETTER
[facility]  [address], [place], [place]    Authorization for Surgical Operation and Procedure                               I hereby authorize Leon Piña MD, my physician and his/her assistants (if applicable), which may include medical students, residents, and/or fellows, to perform the following surgical operation/ procedure and administer such anesthesia as may be determined necessary by my physician: Operation/Procedure name (s) LEFT REVERSE TOTAL SHOULDER on Clyde Castro   2.   I recognize that during the surgical operation/procedure, unforeseen conditions may necessitate additional or different procedures than those listed above.  I, therefore, further authorize and request that the above-named surgeon, assistants, or designees perform such procedures as are, in their judgment, necessary and desirable.    3.   My surgeon/physician has discussed prior to my surgery the potential benefits, risks and side effects of this procedure; the likelihood of achieving goals; and potential problems that might occur during recuperation.  They also discussed reasonable alternatives to the procedure, including risks, benefits, and side effects related to the alternatives and risks related to not receiving this procedure.  I have had all my questions answered and I acknowledge that no guarantee has been made as to the result that may be obtained.    4.   Should the need arise during my operation/procedure, which includes change of level of care prior to discharge, I also consent to the administration of blood and/or blood products.  Further, I understand that despite careful testing and screening of blood or blood products by collecting agencies, I may still be subject to ill effects as a result of receiving a blood transfusion and/or blood products.  The following are some, but not all, of the potential risks that can occur: fever and allergic reactions, hemolytic reactions,  transmission of diseases such as Hepatitis, AIDS and Cytomegalovirus (CMV) and fluid overload.  In the event that I wish to have an autologous transfusion of my own blood, or a directed donor transfusion, I will discuss this with my physician.  Check only if Refusing Blood or Blood Products  I understand refusal of blood or blood products as deemed necessary by my physician may have serious consequences to my condition to include possible death. I hereby assume responsibility for my refusal and release the hospital, its personnel, and my physicians from any responsibility for the consequences of my refusal.    o  Refuse   5.   I authorize the use of any specimen, organs, tissues, body parts or foreign objects that may be removed from my body during the operation/procedure for diagnosis, research or teaching purposes and their subsequent disposal by hospital authorities.  I also authorize the release of specimen test results and/or written reports to my treating physician on the hospital medical staff or other referring or consulting physicians involved in my care, at the discretion of the Pathologist or my treating physician.    6.   I consent to the photographing or videotaping of the operations or procedures to be performed, including appropriate portions of my body for medical, scientific, or educational purposes, provided my identity is not revealed by the pictures or by descriptive texts accompanying them.  If the procedure has been photographed/videotaped, the surgeon will obtain the original picture, image, videotape or CD.  The hospital will not be responsible for storage, release or maintenance of the picture, image, tape or CD.    7.   I consent to the presence of a  or observers in the operating room as deemed necessary by my physician or their designees.    8.   I recognize that in the event my procedure results in extended X-Ray/fluoroscopy time, I may develop a skin reaction.    9. If  I have a Do Not Attempt Resuscitation (DNAR) order in place, that status will be suspended while in the operating room, procedural suite, and during the recovery period unless otherwise explicitly stated by me (or a person authorized to consent on my behalf). The surgeon or my attending physician will determine when the applicable recovery period ends for purposes of reinstating the DNAR order.  10. Patients having a sterilization procedure: I understand that if the procedure is successful the results will be permanent and it will therefore be impossible for me to inseminate, conceive, or bear children.  I also understand that the procedure is intended to result in sterility, although the result has not been guaranteed.   11. I acknowledge that my physician has explained sedation/analgesia administration to me including the risk and benefits I consent to the administration of sedation/analgesia as may be necessary or desirable in the judgment of my physician.    I CERTIFY THAT I HAVE READ AND FULLY UNDERSTAND THE ABOVE CONSENT TO OPERATION and/or OTHER PROCEDURE.     ____________________________________  _________________________________        ______________________________  Signature of Patient    Signature of Responsible Person                Printed Name of Responsible Person                                      ____________________________________  _____________________________                ________________________________  Signature of Witness        Date  Time         Relationship to Patient    STATEMENT OF PHYSICIAN My signature below affirms that prior to the time of the procedure; I have explained to the patient and/or his/her legal representative, the risks and benefits involved in the proposed treatment and any reasonable alternative to the proposed treatment. I have also explained the risks and benefits involved in refusal of the proposed treatment and alternatives to the proposed treatment and have  answered the patient's questions. If I have a significant financial interest in a co-management agreement or a significant financial interest in any product or implant, or other significant relationship used in this procedure/surgery, I have disclosed this and had a discussion with my patient.     _____________________________________________________              _____________________________  (Signature of Physician)                                                                                         (Date)                                   (Time)  Patient Name: Clyde TOMAS Gloria      : 1955      Printed: 2/15/2025     Medical Record #: [id number]                                      Page 1 of 1

## (undated) NOTE — LETTER
[facility]  [address], [place], [place]    Authorization for Surgical Operation and Procedure                               I hereby authorize Leon Piña MD, my physician and his/her assistants (if applicable), which may include medical students, residents, and/or fellows, to perform the following surgical operation/ procedure and administer such anesthesia as may be determined necessary by my physician: Operation/Procedure name (s) LEFT REVERSE TOTAL SHOULDER on Clyde Castro   2.   I recognize that during the surgical operation/procedure, unforeseen conditions may necessitate additional or different procedures than those listed above.  I, therefore, further authorize and request that the above-named surgeon, assistants, or designees perform such procedures as are, in their judgment, necessary and desirable.    3.   My surgeon/physician has discussed prior to my surgery the potential benefits, risks and side effects of this procedure; the likelihood of achieving goals; and potential problems that might occur during recuperation.  They also discussed reasonable alternatives to the procedure, including risks, benefits, and side effects related to the alternatives and risks related to not receiving this procedure.  I have had all my questions answered and I acknowledge that no guarantee has been made as to the result that may be obtained.    4.   Should the need arise during my operation/procedure, which includes change of level of care prior to discharge, I also consent to the administration of blood and/or blood products.  Further, I understand that despite careful testing and screening of blood or blood products by collecting agencies, I may still be subject to ill effects as a result of receiving a blood transfusion and/or blood products.  The following are some, but not all, of the potential risks that can occur: fever and allergic reactions, hemolytic reactions,  transmission of diseases such as Hepatitis, AIDS and Cytomegalovirus (CMV) and fluid overload.  In the event that I wish to have an autologous transfusion of my own blood, or a directed donor transfusion, I will discuss this with my physician.  Check only if Refusing Blood or Blood Products  I understand refusal of blood or blood products as deemed necessary by my physician may have serious consequences to my condition to include possible death. I hereby assume responsibility for my refusal and release the hospital, its personnel, and my physicians from any responsibility for the consequences of my refusal.    o  Refuse   5.   I authorize the use of any specimen, organs, tissues, body parts or foreign objects that may be removed from my body during the operation/procedure for diagnosis, research or teaching purposes and their subsequent disposal by hospital authorities.  I also authorize the release of specimen test results and/or written reports to my treating physician on the hospital medical staff or other referring or consulting physicians involved in my care, at the discretion of the Pathologist or my treating physician.    6.   I consent to the photographing or videotaping of the operations or procedures to be performed, including appropriate portions of my body for medical, scientific, or educational purposes, provided my identity is not revealed by the pictures or by descriptive texts accompanying them.  If the procedure has been photographed/videotaped, the surgeon will obtain the original picture, image, videotape or CD.  The hospital will not be responsible for storage, release or maintenance of the picture, image, tape or CD.    7.   I consent to the presence of a  or observers in the operating room as deemed necessary by my physician or their designees.    8.   I recognize that in the event my procedure results in extended X-Ray/fluoroscopy time, I may develop a skin reaction.    9. If  I have a Do Not Attempt Resuscitation (DNAR) order in place, that status will be suspended while in the operating room, procedural suite, and during the recovery period unless otherwise explicitly stated by me (or a person authorized to consent on my behalf). The surgeon or my attending physician will determine when the applicable recovery period ends for purposes of reinstating the DNAR order.  10. Patients having a sterilization procedure: I understand that if the procedure is successful the results will be permanent and it will therefore be impossible for me to inseminate, conceive, or bear children.  I also understand that the procedure is intended to result in sterility, although the result has not been guaranteed.   11. I acknowledge that my physician has explained sedation/analgesia administration to me including the risk and benefits I consent to the administration of sedation/analgesia as may be necessary or desirable in the judgment of my physician.    I CERTIFY THAT I HAVE READ AND FULLY UNDERSTAND THE ABOVE CONSENT TO OPERATION and/or OTHER PROCEDURE.     ____________________________________  _________________________________        ______________________________  Signature of Patient    Signature of Responsible Person                Printed Name of Responsible Person                                      ____________________________________  _____________________________                ________________________________  Signature of Witness        Date  Time         Relationship to Patient    STATEMENT OF PHYSICIAN My signature below affirms that prior to the time of the procedure; I have explained to the patient and/or his/her legal representative, the risks and benefits involved in the proposed treatment and any reasonable alternative to the proposed treatment. I have also explained the risks and benefits involved in refusal of the proposed treatment and alternatives to the proposed treatment and have  answered the patient's questions. If I have a significant financial interest in a co-management agreement or a significant financial interest in any product or implant, or other significant relationship used in this procedure/surgery, I have disclosed this and had a discussion with my patient.     _____________________________________________________              _____________________________  (Signature of Physician)                                                                                         (Date)                                   (Time)  Patient Name: Clyde TOMAS Gloria      : 1955      Printed: 2025     Medical Record #: [id number]                                      Page 1 of 1

## (undated) NOTE — Clinical Note
[facility]  [address], [place], [place]    Authorization for Surgical Operation and Procedure                               1. I hereby authorize Leon Piña MD, my physician and his/her assistants (if applicable), which may include medical students, residents, and/or fellows, to perform the following surgical operation/ procedure and administer such anesthesia as may be determined necessary by my physician: Operation/Procedure name (s) LEFT REVERSE TOTAL SHOULDER on Clyde Castro   2.   I recognize that during the surgical operation/procedure, unforeseen conditions may necessitate additional or different procedures than those listed above.  I, therefore, further authorize and request that the above-named surgeon, assistants, or designees perform such procedures as are, in their judgment, necessary and desirable.    3.   My surgeon/physician has discussed prior to my surgery the potential benefits, risks and side effects of this procedure; the likelihood of achieving goals; and potential problems that might occur during recuperation.  They also discussed reasonable alternatives to the procedure, including risks, benefits, and side effects related to the alternatives and risks related to not receiving this procedure.  I have had all my questions answered and I acknowledge that no guarantee has been made as to the result that may be obtained.    4.   Should the need arise during my operation/procedure, which includes change of level of care prior to discharge, I also consent to the administration of blood and/or blood products.  Further, I understand that despite careful testing and screening of blood or blood products by collecting agencies, I may still be subject to ill effects as a result of receiving a blood transfusion and/or blood products.  The following are some, but not all, of the potential risks that can occur: fever and allergic reactions, hemolytic reactions,  transmission of diseases such as Hepatitis, AIDS and Cytomegalovirus (CMV) and fluid overload.  In the event that I wish to have an autologous transfusion of my own blood, or a directed donor transfusion, I will discuss this with my physician.  Check only if Refusing Blood or Blood Products  I understand refusal of blood or blood products as deemed necessary by my physician may have serious consequences to my condition to include possible death. I hereby assume responsibility for my refusal and release the hospital, its personnel, and my physicians from any responsibility for the consequences of my refusal.    o  Refuse   5.   I authorize the use of any specimen, organs, tissues, body parts or foreign objects that may be removed from my body during the operation/procedure for diagnosis, research or teaching purposes and their subsequent disposal by hospital authorities.  I also authorize the release of specimen test results and/or written reports to my treating physician on the hospital medical staff or other referring or consulting physicians involved in my care, at the discretion of the Pathologist or my treating physician.    6.   I consent to the photographing or videotaping of the operations or procedures to be performed, including appropriate portions of my body for medical, scientific, or educational purposes, provided my identity is not revealed by the pictures or by descriptive texts accompanying them.  If the procedure has been photographed/videotaped, the surgeon will obtain the original picture, image, videotape or CD.  The hospital will not be responsible for storage, release or maintenance of the picture, image, tape or CD.    7.   I consent to the presence of a  or observers in the operating room as deemed necessary by my physician or their designees.    8.   I recognize that in the event my procedure results in extended X-Ray/fluoroscopy time, I may develop a skin reaction.    9. If  I have a Do Not Attempt Resuscitation (DNAR) order in place, that status will be suspended while in the operating room, procedural suite, and during the recovery period unless otherwise explicitly stated by me (or a person authorized to consent on my behalf). The surgeon or my attending physician will determine when the applicable recovery period ends for purposes of reinstating the DNAR order.  10. Patients having a sterilization procedure: I understand that if the procedure is successful the results will be permanent and it will therefore be impossible for me to inseminate, conceive, or bear children.  I also understand that the procedure is intended to result in sterility, although the result has not been guaranteed.   11. I acknowledge that my physician has explained sedation/analgesia administration to me including the risk and benefits I consent to the administration of sedation/analgesia as may be necessary or desirable in the judgment of my physician.    I CERTIFY THAT I HAVE READ AND FULLY UNDERSTAND THE ABOVE CONSENT TO OPERATION and/or OTHER PROCEDURE.     ____________________________________  _________________________________        ______________________________  Signature of Patient    Signature of Responsible Person                Printed Name of Responsible Person                                      ____________________________________  _____________________________                ________________________________  Signature of Witness        Date  Time         Relationship to Patient    STATEMENT OF PHYSICIAN My signature below affirms that prior to the time of the procedure; I have explained to the patient and/or his/her legal representative, the risks and benefits involved in the proposed treatment and any reasonable alternative to the proposed treatment. I have also explained the risks and benefits involved in refusal of the proposed treatment and alternatives to the proposed treatment and have  answered the patient's questions. If I have a significant financial interest in a co-management agreement or a significant financial interest in any product or implant, or other significant relationship used in this procedure/surgery, I have disclosed this and had a discussion with my patient.     _____________________________________________________              _____________________________  (Signature of Physician)                                                                                         (Date)                                   (Time)  Patient Name: Clyde TOMAS Gloria      : 1955      Printed: 2025     Medical Record #: [id number]                                      Page 1 of 1

## (undated) NOTE — Clinical Note
[facility]  Administration of Anesthesia  1. I, Clyde Castro agree to be cared for by a physician anesthesiologist alone and/or with a nurse anesthetist, who is specially trained to monitor me and give me medicine to put me to sleep or keep me comfortable during my procedure    I understand that my anesthesiologist and/or anesthetist is not an employee or agent of [facility] or [employer]. He or she works for [facility]    2. As the patient asking for anesthesia services, I agree to:  a. Allow the anesthesiologist (anesthesia doctor) to give me medicine and do additional procedures as necessary. Some examples are: Starting or using an “IV” to give me medicine, fluids or blood during my procedure, and having a breathing tube placed to help me breathe when I’m asleep (intubation). In the event that my heart stops working properly, I understand that my anesthesiologist will make every effort to sustain my life, unless otherwise directed by [facility] Do Not Resuscitate documents.  b. Tell my anesthesia doctor before my procedure:  i. If I am pregnant.  ii. The last time that I ate or drank.  iii. All of the medicines I take (including prescriptions, herbal supplements, and pills I can buy without a prescription (including street drugs/illegal medications). Failure to inform my anesthesiologist about these medicines may increase my risk of anesthetic complications.  iv.If I am allergic to anything or have had a reaction to anesthesia before.  3. I understand how the anesthesia medicine will help me (benefits).  4. I understand that with any type of anesthesia medicine there are risks:  a. The most common risks are: nausea, vomiting, sore throat, muscle soreness, damage to my eyes, mouth, or teeth (from breathing tube placement).  b. Rare risks include: remembering what happened during my procedure, allergic reactions to medications, injury to my airway, heart,  lungs, vision, nerves, or muscles and in extremely rare instances death.  5. My doctor has explained to me other choices available to me for my care (alternatives).  6. Pregnant Patients (“epidural”):  I understand that the risks of having an epidural (medicine given into my back to help control pain during labor), include itching, low blood pressure, difficulty urinating, headache or slowing of the baby’s heart. Very rare risks include infection, bleeding, seizure, irregular heart rhythms and nerve injury.  7. Regional Anesthesia (“spinal”, “epidural”, & “nerve blocks”):  I understand that rare but potential complications include headache, bleeding, infection, seizure, irregular heart rhythms, and nerve injury.    _____________________________________________________________________________  Patient (or Representative) Signature/Relationship to Patient  Date   Time    _____________________________________________________________________________   Name (if used)    Language/Organization   Time    _____________________________________________________________________________  Nurse Anesthetist Signature     Date   Time  _____________________________________________________________________________  Anesthesiologist Signature     Date   Time  I have discussed the procedure and information above with the patient (or patient’s representative) and answered their questions. The patient or their representative has agreed to have anesthesia services.    _____________________________________________________________________________  Witness        Date   Time  I have verified that the signature is that of the patient or patient’s representative, and that it was signed before the procedure  Patient Name: Clyde Castro     : 1955                 Printed: 2025 at 12:22 AM    Medical Record #: [id number]                                            Page 1 of 1  ----------ANESTHESIA CONSENT----------